# Patient Record
Sex: FEMALE | Race: WHITE | NOT HISPANIC OR LATINO | Employment: OTHER | URBAN - METROPOLITAN AREA
[De-identification: names, ages, dates, MRNs, and addresses within clinical notes are randomized per-mention and may not be internally consistent; named-entity substitution may affect disease eponyms.]

---

## 2017-01-10 ENCOUNTER — OFFICE VISIT (OUTPATIENT)
Dept: INTERNAL MEDICINE | Facility: CLINIC | Age: 62
End: 2017-01-10
Attending: INTERNAL MEDICINE
Payer: COMMERCIAL

## 2017-01-10 VITALS
WEIGHT: 202.63 LBS | SYSTOLIC BLOOD PRESSURE: 116 MMHG | DIASTOLIC BLOOD PRESSURE: 70 MMHG | HEART RATE: 63 BPM | BODY MASS INDEX: 37.29 KG/M2 | OXYGEN SATURATION: 97 % | HEIGHT: 62 IN

## 2017-01-10 DIAGNOSIS — Z01.810 PREOP CARDIOVASCULAR EXAM: Primary | ICD-10-CM

## 2017-01-10 PROCEDURE — 99213 OFFICE O/P EST LOW 20 MIN: CPT | Mod: S$GLB,,, | Performed by: INTERNAL MEDICINE

## 2017-01-10 PROCEDURE — 99999 PR PBB SHADOW E&M-EST. PATIENT-LVL III: CPT | Mod: PBBFAC,,, | Performed by: INTERNAL MEDICINE

## 2017-01-10 PROCEDURE — 3074F SYST BP LT 130 MM HG: CPT | Mod: S$GLB,,, | Performed by: INTERNAL MEDICINE

## 2017-01-10 PROCEDURE — 1159F MED LIST DOCD IN RCRD: CPT | Mod: S$GLB,,, | Performed by: INTERNAL MEDICINE

## 2017-01-10 PROCEDURE — 3078F DIAST BP <80 MM HG: CPT | Mod: S$GLB,,, | Performed by: INTERNAL MEDICINE

## 2017-01-10 NOTE — PROGRESS NOTES
"Subjective:       Patient ID: Lucas Mayer is a 61 y.o. female.    Chief Complaint: Pre-op Exam    HPI Comments: Here for urgent visit    Basal cell carcinoma of eye lid and is scheduled for partial resection of eyelid by Dr. Alcala. Has not done a home sleep study yet. She denies CP, SOB, RODRIGUEZ, PND, orthopnea, palpitations. Long Hx of dizziness provoked by certain environments or head movements. No change in symptoms and particularly no exertional dizziness.        Review of Systems    Objective:      Vitals:    01/10/17 1018   BP: 116/70   Pulse: 63   SpO2: 97%   Weight: 91.9 kg (202 lb 9.6 oz)   Height: 5' 2" (1.575 m)      Physical Exam   Constitutional: She is oriented to person, place, and time. She appears well-developed and well-nourished. No distress.   HENT:   Head: Normocephalic and atraumatic.   Mouth/Throat: Oropharynx is clear and moist. No oropharyngeal exudate.   Eyes: Conjunctivae and EOM are normal. Pupils are equal, round, and reactive to light. No scleral icterus.   Neck: No thyromegaly present.   Cardiovascular: Normal rate, regular rhythm and normal heart sounds.    No murmur heard.  Pulmonary/Chest: Effort normal and breath sounds normal. She has no wheezes. She has no rales.   Abdominal: Soft. She exhibits no distension. There is no tenderness.   Musculoskeletal: She exhibits no edema or tenderness.   Lymphadenopathy:     She has no cervical adenopathy.   Neurological: She is alert and oriented to person, place, and time.   Skin: Skin is warm and dry.   Psychiatric: She has a normal mood and affect. Her behavior is normal.       Assessment:       1. Preop cardiovascular exam        Plan:       Lucas was seen today for pre-op exam.    Diagnoses and all orders for this visit:    Preop cardiovascular exam  -No signs or symptoms of underlying or uncontrolled cardiac or pulmonary pathology  -EKG done 12/27/16 without any acute or remote signs of ischemia or underlying arrhythmia   -No " additional testing or change in management is required prior to elective procedure. Pt given completed pre-op paperwork today.             Side effects of medication(s) were discussed in detail and patient voiced understanding.  Patient will call back for any issues or complications.

## 2017-01-23 DIAGNOSIS — I10 BENIGN ESSENTIAL HYPERTENSION: ICD-10-CM

## 2017-01-23 RX ORDER — AMLODIPINE BESYLATE 5 MG/1
TABLET ORAL
Qty: 90 TABLET | Refills: 1 | Status: SHIPPED | OUTPATIENT
Start: 2017-01-23 | End: 2018-08-29

## 2017-03-07 ENCOUNTER — PATIENT MESSAGE (OUTPATIENT)
Dept: INTERNAL MEDICINE | Facility: CLINIC | Age: 62
End: 2017-03-07

## 2017-03-08 ENCOUNTER — PATIENT MESSAGE (OUTPATIENT)
Dept: INTERNAL MEDICINE | Facility: CLINIC | Age: 62
End: 2017-03-08

## 2017-06-01 ENCOUNTER — PATIENT MESSAGE (OUTPATIENT)
Dept: INTERNAL MEDICINE | Facility: CLINIC | Age: 62
End: 2017-06-01

## 2017-06-01 ENCOUNTER — PATIENT MESSAGE (OUTPATIENT)
Dept: ADMINISTRATIVE | Facility: HOSPITAL | Age: 62
End: 2017-06-01

## 2017-06-01 ENCOUNTER — PATIENT OUTREACH (OUTPATIENT)
Dept: ADMINISTRATIVE | Facility: HOSPITAL | Age: 62
End: 2017-06-01

## 2017-06-01 DIAGNOSIS — E55.9 VITAMIN D DEFICIENCY: ICD-10-CM

## 2017-06-01 DIAGNOSIS — R73.03 PRE-DIABETES: ICD-10-CM

## 2017-06-01 DIAGNOSIS — Z00.00 PREVENTATIVE HEALTH CARE: ICD-10-CM

## 2017-06-01 DIAGNOSIS — E78.5 HYPERLIPIDEMIA, UNSPECIFIED HYPERLIPIDEMIA TYPE: Primary | ICD-10-CM

## 2017-06-01 DIAGNOSIS — I10 ESSENTIAL HYPERTENSION: ICD-10-CM

## 2017-06-01 NOTE — TELEPHONE ENCOUNTER
Scheduled pt for labs at [facility] location, pt verbalized understanding and had no further questions

## 2017-06-02 ENCOUNTER — LAB VISIT (OUTPATIENT)
Dept: LAB | Facility: HOSPITAL | Age: 62
End: 2017-06-02
Attending: INTERNAL MEDICINE
Payer: COMMERCIAL

## 2017-06-02 DIAGNOSIS — E78.5 HYPERLIPIDEMIA, UNSPECIFIED HYPERLIPIDEMIA TYPE: ICD-10-CM

## 2017-06-02 DIAGNOSIS — I10 ESSENTIAL HYPERTENSION: ICD-10-CM

## 2017-06-02 DIAGNOSIS — Z00.00 PREVENTATIVE HEALTH CARE: ICD-10-CM

## 2017-06-02 DIAGNOSIS — E55.9 VITAMIN D DEFICIENCY: ICD-10-CM

## 2017-06-02 DIAGNOSIS — R73.03 PRE-DIABETES: ICD-10-CM

## 2017-06-02 LAB
25(OH)D3+25(OH)D2 SERPL-MCNC: 39 NG/ML
ALBUMIN SERPL BCP-MCNC: 4.3 G/DL
ALP SERPL-CCNC: 126 IU/L
ALT SERPL W/O P-5'-P-CCNC: 33 IU/L
ANION GAP SERPL CALC-SCNC: 10 MMOL/L
AST SERPL-CCNC: 26 IU/L
BASOPHILS # BLD AUTO: 0.04 K/UL
BASOPHILS NFR BLD: 0.6 %
BILIRUB SERPL-MCNC: 0.8 MG/DL
BUN SERPL-MCNC: 14 MG/DL
CALCIUM SERPL-MCNC: 9.3 MG/DL
CHLORIDE SERPL-SCNC: 104 MMOL/L
CHOLEST/HDLC SERPL: 3.9 {RATIO}
CO2 SERPL-SCNC: 29 MMOL/L
CREAT SERPL-MCNC: 0.66 MG/DL
DIFFERENTIAL METHOD: NORMAL
EOSINOPHIL # BLD AUTO: 0.2 K/UL
EOSINOPHIL NFR BLD: 3.5 %
ERYTHROCYTE [DISTWIDTH] IN BLOOD BY AUTOMATED COUNT: 13.5 %
EST. GFR  (AFRICAN AMERICAN): >60 ML/MIN/1.73 M^2
EST. GFR  (NON AFRICAN AMERICAN): >60 ML/MIN/1.73 M^2
GLUCOSE SERPL-MCNC: 99 MG/DL
HCT VFR BLD AUTO: 40.7 %
HDL/CHOLESTEROL RATIO: 25.6 %
HDLC SERPL-MCNC: 160 MG/DL
HDLC SERPL-MCNC: 41 MG/DL
HGB BLD-MCNC: 13.5 G/DL
LDLC SERPL CALC-MCNC: 93.2 MG/DL
LYMPHOCYTES # BLD AUTO: 1.4 K/UL
LYMPHOCYTES NFR BLD: 21.2 %
MCH RBC QN AUTO: 28.5 PG
MCHC RBC AUTO-ENTMCNC: 33.2 %
MCV RBC AUTO: 86 FL
MONOCYTES # BLD AUTO: 0.4 K/UL
MONOCYTES NFR BLD: 5.6 %
NEUTROPHILS # BLD AUTO: 4.7 K/UL
NEUTROPHILS NFR BLD: 69 %
NONHDLC SERPL-MCNC: 119 MG/DL
PLATELET # BLD AUTO: 245 K/UL
PMV BLD AUTO: 9.6 FL
POTASSIUM SERPL-SCNC: 4.5 MMOL/L
PROT SERPL-MCNC: 7.7 G/DL
RBC # BLD AUTO: 4.74 M/UL
SODIUM SERPL-SCNC: 143 MMOL/L
TRIGL SERPL-MCNC: 129 MG/DL
TSH SERPL DL<=0.005 MIU/L-ACNC: 3.12 UIU/ML
WBC # BLD AUTO: 6.79 K/UL

## 2017-06-02 PROCEDURE — 83036 HEMOGLOBIN GLYCOSYLATED A1C: CPT | Mod: PO

## 2017-06-02 PROCEDURE — 80053 COMPREHEN METABOLIC PANEL: CPT | Mod: PO

## 2017-06-02 PROCEDURE — 82306 VITAMIN D 25 HYDROXY: CPT | Mod: PO

## 2017-06-02 PROCEDURE — 85025 COMPLETE CBC W/AUTO DIFF WBC: CPT | Mod: PO

## 2017-06-02 PROCEDURE — 84443 ASSAY THYROID STIM HORMONE: CPT

## 2017-06-02 PROCEDURE — 36415 COLL VENOUS BLD VENIPUNCTURE: CPT | Mod: PO

## 2017-06-02 PROCEDURE — 80061 LIPID PANEL: CPT

## 2017-06-03 LAB
ESTIMATED AVG GLUCOSE: 114 MG/DL
HBA1C MFR BLD HPLC: 5.6 %

## 2017-06-15 ENCOUNTER — OFFICE VISIT (OUTPATIENT)
Dept: INTERNAL MEDICINE | Facility: CLINIC | Age: 62
End: 2017-06-15
Attending: INTERNAL MEDICINE
Payer: COMMERCIAL

## 2017-06-15 VITALS
HEIGHT: 62 IN | HEART RATE: 68 BPM | OXYGEN SATURATION: 97 % | WEIGHT: 196 LBS | DIASTOLIC BLOOD PRESSURE: 62 MMHG | SYSTOLIC BLOOD PRESSURE: 118 MMHG | BODY MASS INDEX: 36.07 KG/M2

## 2017-06-15 DIAGNOSIS — Z01.419 WELL WOMAN EXAM: ICD-10-CM

## 2017-06-15 DIAGNOSIS — E78.5 HYPERLIPIDEMIA, UNSPECIFIED HYPERLIPIDEMIA TYPE: ICD-10-CM

## 2017-06-15 DIAGNOSIS — K76.0 NAFLD (NONALCOHOLIC FATTY LIVER DISEASE): ICD-10-CM

## 2017-06-15 DIAGNOSIS — R42 VERTIGO: ICD-10-CM

## 2017-06-15 DIAGNOSIS — I10 BENIGN HYPERTENSION: ICD-10-CM

## 2017-06-15 DIAGNOSIS — Z23 NEED FOR DIPHTHERIA-TETANUS-PERTUSSIS (TDAP) VACCINE: Primary | ICD-10-CM

## 2017-06-15 DIAGNOSIS — Z12.31 ENCOUNTER FOR SCREENING MAMMOGRAM FOR BREAST CANCER: ICD-10-CM

## 2017-06-15 PROCEDURE — 90471 IMMUNIZATION ADMIN: CPT | Mod: S$GLB,,, | Performed by: INTERNAL MEDICINE

## 2017-06-15 PROCEDURE — 99214 OFFICE O/P EST MOD 30 MIN: CPT | Mod: 25,S$GLB,, | Performed by: INTERNAL MEDICINE

## 2017-06-15 PROCEDURE — 90715 TDAP VACCINE 7 YRS/> IM: CPT | Mod: S$GLB,,, | Performed by: INTERNAL MEDICINE

## 2017-06-15 PROCEDURE — 99999 PR PBB SHADOW E&M-EST. PATIENT-LVL III: CPT | Mod: PBBFAC,,, | Performed by: INTERNAL MEDICINE

## 2017-06-15 NOTE — PROGRESS NOTES
Subjective:       Patient ID: Lucas Mayer is a 62 y.o. female.    Chief Complaint: No chief complaint on file.    Here for annual exam    Vertigo  -Seen by neurology who recommended vestibular rehab, but pt has not attended. She was evaluated by ENT Dr. White and was told she had normal hearing and vestibular test. She was placed on low dose valium 2mg BID with an 80% reduction in her symptoms. Symptoms remain well controlled. She will still get dizzy when lying flat or driving over bridges.     Bilateral knee   -frequent cracking and occasional pain. Not debilitating. Overall stable    Suspected MORALES/fatigue  Initial sleep study denied by insurance. Home sleep study planned but patient has not yet scheduled    HTN  -well controlled with initiation of amlodipine. Pt has also lost 30lbs since last visit    Due for FOBT in 2 months.          Review of Systems   HENT: Negative.    Eyes: Negative.    Respiratory: Negative.    Cardiovascular: Negative.    Gastrointestinal: Negative.    Endocrine: Negative.    Genitourinary: Negative.    Musculoskeletal: Positive for arthralgias and joint swelling.   Skin: Negative.    Allergic/Immunologic: Negative.    Neurological: Positive for dizziness.   Hematological: Negative.    Psychiatric/Behavioral: Negative.        Objective:      There were no vitals filed for this visit.   Physical Exam   Constitutional: She is oriented to person, place, and time. She appears well-developed and well-nourished. No distress.   HENT:   Head: Normocephalic and atraumatic.   Mouth/Throat: Oropharynx is clear and moist. No oropharyngeal exudate.   Eyes: Conjunctivae and EOM are normal. Pupils are equal, round, and reactive to light. No scleral icterus.   Neck: No thyromegaly present.   Cardiovascular: Normal rate, regular rhythm and normal heart sounds.    No murmur heard.  Pulmonary/Chest: Effort normal and breath sounds normal. She has no wheezes. She has no rales.   Abdominal: Soft. She  exhibits no distension. There is no tenderness.   Musculoskeletal: She exhibits no edema or tenderness.        Left knee: She exhibits decreased range of motion and swelling. She exhibits no effusion, normal patellar mobility, no bony tenderness and normal meniscus. No tenderness found.   Lymphadenopathy:     She has no cervical adenopathy.   Neurological: She is alert and oriented to person, place, and time.   Skin: Skin is warm and dry.   Psychiatric: She has a normal mood and affect. Her behavior is normal.       Assessment:       1. Need for diphtheria-tetanus-pertussis (Tdap) vaccine    2. Encounter for screening mammogram for breast cancer        Plan:       Lucas was seen today for annual exam.    Diagnoses and all orders for this visit:  Lucas was seen today for annual exam.    Diagnoses and all orders for this visit:    Need for diphtheria-tetanus-pertussis (Tdap) vaccine  -     Tdap Vaccine    Encounter for screening mammogram for breast cancer  -     Mammo Digital Screening Bilat with CAD; Future    NAFLD (nonalcoholic fatty liver disease)  -pt is losing weight and dieting. LFTs now normal. Will monitor.    Vertigo  Continue valium BID    Hyperlipidemia, unspecified hyperlipidemia type  -continue statin    Benign hypertension  Controlled. Will monitor as patient may not need if weight loss continues    Well woman exam  -     Ambulatory Referral to Obstetrics / Gynecology    Suspected MORALES/fatigue  stressed need for completion sleep study and ramifications of uncontrolled MORALES.                   Side effects of medication(s) were discussed in detail and patient voiced understanding.  Patient will call back for any issues or complications.

## 2017-06-15 NOTE — PROGRESS NOTES
"Subjective:       Patient ID: Lucas Mayer is a 62 y.o. female.    Chief Complaint: Annual Exam    Here for annual visit            Review of Systems    Objective:      Vitals:    06/15/17 1308   BP: 118/62   Pulse: 68   SpO2: 97%   Weight: 88.9 kg (195 lb 15.8 oz)   Height: 5' 2" (1.575 m)      Physical Exam    Assessment:       1. Need for diphtheria-tetanus-pertussis (Tdap) vaccine    2. Encounter for screening mammogram for breast cancer    3. NAFLD (nonalcoholic fatty liver disease)    4. Vertigo    5. Hyperlipidemia, unspecified hyperlipidemia type    6. Benign hypertension        Plan:       Lucas was seen today for annual exam.    Diagnoses and all orders for this visit:    Need for diphtheria-tetanus-pertussis (Tdap) vaccine  -     Tdap Vaccine    Encounter for screening mammogram for breast cancer  -     Mammo Digital Screening Bilat with CAD; Future    NAFLD (nonalcoholic fatty liver disease)    Vertigo    Hyperlipidemia, unspecified hyperlipidemia type    Benign hypertension            Notification of Lab Results: {Blank single:57137::"[facility]","Phone Call","Letter"}    Side effects of medication(s) were discussed in detail and patient voiced understanding.  Patient will call back for any issues or complications.   "

## 2017-06-15 NOTE — PROGRESS NOTES
Patient given TDAP IM in the LD. Patient tolerated well and Band-Aid was applied. Lot#[id number] Exp:04/05/2019. Patient advised to wait in the lobby for 15 min to make sure no adverse reactions occur. Patient states verbal understanding and has no further questions.

## 2017-06-16 ENCOUNTER — HOSPITAL ENCOUNTER (OUTPATIENT)
Dept: RADIOLOGY | Facility: HOSPITAL | Age: 62
Discharge: HOME | End: 2017-06-16
Attending: INTERNAL MEDICINE
Payer: COMMERCIAL

## 2017-06-16 DIAGNOSIS — Z12.31 ENCOUNTER FOR SCREENING MAMMOGRAM FOR BREAST CANCER: ICD-10-CM

## 2017-06-16 PROCEDURE — 77067 SCR MAMMO BI INCL CAD: CPT | Mod: TC

## 2017-07-24 ENCOUNTER — PATIENT MESSAGE (OUTPATIENT)
Dept: INTERNAL MEDICINE | Facility: CLINIC | Age: 62
End: 2017-07-24

## 2017-07-24 DIAGNOSIS — H53.8 BLURRY VISION: Primary | ICD-10-CM

## 2017-07-24 DIAGNOSIS — H49.10: ICD-10-CM

## 2017-07-24 DIAGNOSIS — I10 BENIGN ESSENTIAL HYPERTENSION: ICD-10-CM

## 2017-07-24 RX ORDER — AMLODIPINE BESYLATE 5 MG/1
TABLET ORAL
Qty: 90 TABLET | Refills: 3 | Status: SHIPPED | OUTPATIENT
Start: 2017-07-24 | End: 2017-08-16 | Stop reason: SDUPTHER

## 2017-08-03 ENCOUNTER — OFFICE VISIT (OUTPATIENT)
Dept: OBSTETRICS AND GYNECOLOGY | Facility: CLINIC | Age: 62
End: 2017-08-03
Payer: COMMERCIAL

## 2017-08-03 VITALS
HEIGHT: 62 IN | WEIGHT: 194.44 LBS | SYSTOLIC BLOOD PRESSURE: 124 MMHG | DIASTOLIC BLOOD PRESSURE: 70 MMHG | BODY MASS INDEX: 35.78 KG/M2

## 2017-08-03 DIAGNOSIS — Z01.419 WELL WOMAN EXAM WITH ROUTINE GYNECOLOGICAL EXAM: Primary | ICD-10-CM

## 2017-08-03 DIAGNOSIS — N90.89 VULVAR IRRITATION: ICD-10-CM

## 2017-08-03 PROCEDURE — 99396 PREV VISIT EST AGE 40-64: CPT | Mod: S$GLB,,, | Performed by: OBSTETRICS & GYNECOLOGY

## 2017-08-03 PROCEDURE — 99999 PR PBB SHADOW E&M-EST. PATIENT-LVL III: CPT | Mod: PBBFAC,,, | Performed by: OBSTETRICS & GYNECOLOGY

## 2017-08-03 RX ORDER — CLOBETASOL PROPIONATE 0.5 MG/G
OINTMENT TOPICAL 2 TIMES DAILY
Qty: 30 G | Refills: 0 | Status: SHIPPED | OUTPATIENT
Start: 2017-08-03 | End: 2018-08-29

## 2017-08-03 NOTE — PROGRESS NOTES
GYNECOLOGY OFFICE NOTE    Reason for visit: annual    HPI: Pt is a 62 y.o.  female  who presents for annual. S/p BK/BSO secondary fibroids in . Reports hx of abnormal pap in  and had cryo. All since have been normal. She is not sexually active. She does not desire STI screening. She denies vaginal discharge.  Last MMG 2017- negative. Reports an area on left vulvar that has been itching intermittently for this year. Scratches a lot.     Past Medical History:   Diagnosis Date    Depression     Hypertension     Other and unspecified hyperlipidemia     Vertigo        Past Surgical History:   Procedure Laterality Date    HYSTERECTOMY      fibroids- BK/BSO    OOPHORECTOMY      TONSILLECTOMY      WISDOM TOOTH EXTRACTION         Family History   Problem Relation Age of Onset    Diabetes Mother     Stroke Mother     Essential Tremor Mother     Melanoma Father     Hypertension Father     Essential Tremor Sister     Essential Tremor Sister     Breast cancer Neg Hx     Cancer Neg Hx     Eclampsia Neg Hx        Social History   Substance Use Topics    Smoking status: Never Smoker    Smokeless tobacco: Never Used    Alcohol use 0.5 oz/week     1 Standard drinks or equivalent per week      Comment: Social       OB History    Para Term  AB Living   3 1     2 1   SAB TAB Ectopic Multiple Live Births                  # Outcome Date GA Lbr Palmer/2nd Weight Sex Delivery Anes PTL Lv   3 AB            2 AB            1 Para                   Current Outpatient Prescriptions   Medication Sig    amlodipine (NORVASC) 5 MG tablet TAKE 1 TABLET(5 MG) BY MOUTH EVERY DAY    amlodipine (NORVASC) 5 MG tablet TAKE 1 TABLET(5 MG) BY MOUTH EVERY DAY    aspirin (ECOTRIN) 81 MG EC tablet Take 81 mg by mouth once daily.    atorvastatin (LIPITOR) 40 MG tablet TAKE 1 TABLET BY MOUTH EVERY DAY    blood pressure test kit-large Kit Use as directed    citalopram (CELEXA) 20 MG tablet Take  "1 tablet (20 mg total) by mouth once daily.    diazepam (VALIUM) 2 MG tablet     clobetasol 0.05% (TEMOVATE) 0.05 % Oint Apply topically 2 (two) times daily. Apply to affected area twice daily as needed for up to 2 weeks     No current facility-administered medications for this visit.        Allergies: Ancef [cefazolin]; Codeine; and Pcn [penicillins]     /70   Ht 5' 2" (1.575 m)   Wt 88.2 kg (194 lb 7.1 oz)   BMI 35.56 kg/m²     ROS:  GENERAL: Denies fever or chills.   SKIN: Denies rash or lesions.   HEAD: Denies head injury or headache.   CHEST: Denies chest pain or shortness of breath.   CARDIOVASCULAR: Denies palpitations or chest pain.   ABDOMEN: No abdominal pain, constipation, diarrhea, nausea, vomiting or rectal bleeding.   URINARY: No dysuria, hematuria, or burning on urination.  REPRODUCTIVE: See HPI.   BREASTS: Denies pain, lumps, or nipple discharge.   NEUROLOGIC: Denies syncope or weakness.     Physical Exam:  GENERAL: alert, appears stated age and cooperative  CHEST: Normal respiratory effort  HEART: S1 and S2 normal, regular rate and rhythm  NECK: normal appearance, no thyromegaly masses or tenderness  SKIN: no acne, striae, hirsutism  BREAST EXAM: breasts appear normal, no suspicious masses, no skin or nipple changes or axillary nodes  ABDOMEN: abdomen is soft without significant tenderness, masses, organomegaly or guarding, no hernias noted  EXTERNAL GENITALIA:  normal general appearance, small area on right labia majora that's red but no signs of infection  URETHRA: normal urethra, normal urethral meatus  VAGINA:  atrophic mucosa  CERVIX:  absent cervix  UTERUS:  surgically absent  ADNEXA:  not indicated    Diagnosis:  1. Well woman exam with routine gynecological exam    2. Vulvar irritation        Plan:   1. Annual- paps no longer indicated. Up to date with MMG  2. Will try trial of clobetasol. Given precautions to return if worsening or persistent after therapy.     Orders Placed This " Encounter    clobetasol 0.05% (TEMOVATE) 0.05 % Oint       Patient was counseled today on the new ACS guidelines for cervical cytology screening as well as the current recommendations for breast cancer screening. She was counseled to follow up with her PCP for other routine health maintenance.     Return in about 1 year (around 8/3/2018), or if symptoms worsen or fail to improve, for annual.      Ana Paula Salazar MD  OB/GYN  Pager: [phone]

## 2017-08-03 NOTE — LETTER
August 3, 2017      Jean Carlos Samuels MD  [address]  [address]  [facility] [address]           [place] [facility]  [address]  [facility] [address]  Phone: [phone]          Patient: Lucas Mayer   MR Number: [id number]   YOB: 1955   Date of Visit: 8/3/2017       Dear Dr. Jean Carlos Samuels:    Thank you for referring Lucas Mayer to me for evaluation. Attached you will find relevant portions of my assessment and plan of care.    If you have questions, please do not hesitate to call me. I look forward to following Lucas Mayer along with you.    Sincerely,    Ana Paula Salazar MD    Enclosure  CC:  No Recipients    If you would like to receive this communication electronically, please contact [email or web address] or [phone] to request more information on [employer] Link access.    For providers and/or their staff who would like to refer a patient to [facility], please contact us through our one-stop-shop provider referral line, [facility], at [phone].    If you feel you have received this communication in error or would no longer like to receive these types of communications, please e-mail [email or web address]

## 2017-08-07 ENCOUNTER — PATIENT MESSAGE (OUTPATIENT)
Dept: OPHTHALMOLOGY | Facility: CLINIC | Age: 62
End: 2017-08-07

## 2017-08-07 RX ORDER — ATORVASTATIN CALCIUM 40 MG/1
TABLET, FILM COATED ORAL
Qty: 90 TABLET | Refills: 3 | Status: SHIPPED | OUTPATIENT
Start: 2017-08-07 | End: 2017-08-16 | Stop reason: SDUPTHER

## 2017-08-14 RX ORDER — CITALOPRAM 20 MG/1
TABLET, FILM COATED ORAL
Qty: 90 TABLET | Refills: 3 | Status: SHIPPED | OUTPATIENT
Start: 2017-08-14 | End: 2017-08-16 | Stop reason: SDUPTHER

## 2017-08-16 ENCOUNTER — INITIAL CONSULT (OUTPATIENT)
Dept: OPHTHALMOLOGY | Facility: CLINIC | Age: 62
End: 2017-08-16
Payer: COMMERCIAL

## 2017-08-16 DIAGNOSIS — H50.21 HYPERTROPIA OF RIGHT EYE: ICD-10-CM

## 2017-08-16 DIAGNOSIS — H55.09 ROTARY NYSTAGMUS: ICD-10-CM

## 2017-08-16 PROCEDURE — 99999 PR PBB SHADOW E&M-EST. PATIENT-LVL III: CPT | Mod: PBBFAC,,, | Performed by: OPHTHALMOLOGY

## 2017-08-16 PROCEDURE — 92004 COMPRE OPH EXAM NEW PT 1/>: CPT | Mod: S$GLB,,, | Performed by: OPHTHALMOLOGY

## 2017-08-16 NOTE — PROGRESS NOTES
HPI     Concerns About Ocular Health    Additional comments: diplopia           Comments   Referred by   Seen Dr.Kevin Davis   Hx of black out/throw up x 4 years ago which in 4 different occassions.  Pt states she would try to get up out bed she would black out and throw up   which she think it lasted 30 minutes. Pt states this happen in one night.  Dx w/vertigo,double vision and nausea.  Pt states was given prism which help, but notice vision is unstable.    MRI done which was negative.  Eye Drops:Refresh plus daily OU    I have personally interviewed the patient, reviewed the history and   examined the patient and agree with the technician's exam.    Dr. Davis put prism in her eyeglasses.       Last edited by Jerzy Corona MD on 8/16/2017 11:05 AM. (History)            Assessment /Plan     For exam results, see Encounter Report.    Rotary nystagmus    Hypertropia of right eye      Ms. Mayer has a right hyperphoria that she fuses on double Babb anuj testing. The nystagmus appears tied in with her vertigo. I will attempt to get her records from   Dr. Davis's old office and her MRI. I will repeat her exam in two months.

## 2017-08-31 ENCOUNTER — PATIENT MESSAGE (OUTPATIENT)
Dept: OPHTHALMOLOGY | Facility: CLINIC | Age: 62
End: 2017-08-31

## 2017-09-12 ENCOUNTER — PATIENT MESSAGE (OUTPATIENT)
Dept: INTERNAL MEDICINE | Facility: CLINIC | Age: 62
End: 2017-09-12

## 2017-09-29 ENCOUNTER — PATIENT MESSAGE (OUTPATIENT)
Dept: OPHTHALMOLOGY | Facility: CLINIC | Age: 62
End: 2017-09-29

## 2017-10-06 ENCOUNTER — OFFICE VISIT (OUTPATIENT)
Dept: OPHTHALMOLOGY | Facility: CLINIC | Age: 62
End: 2017-10-06
Payer: COMMERCIAL

## 2017-10-06 DIAGNOSIS — H55.09 ROTARY NYSTAGMUS: Primary | ICD-10-CM

## 2017-10-06 PROCEDURE — 99999 PR PBB SHADOW E&M-EST. PATIENT-LVL III: CPT | Mod: PBBFAC,,, | Performed by: OPHTHALMOLOGY

## 2017-10-06 PROCEDURE — 92012 INTRM OPH EXAM EST PATIENT: CPT | Mod: S$GLB,,, | Performed by: OPHTHALMOLOGY

## 2017-10-06 NOTE — PROGRESS NOTES
HPI     DLS:08/16/2017 Cj  Patient here for 2 month follow up Rotary nystagmus.  Occasional shooting pain OU.  Pt states sampled looking at an [employer] and some lines were off(side   by side)    I have personally interviewed the patient, reviewed the history and   examined the patient and agree with the technician's exam.      Last edited by Jerzy Corona MD on 10/6/2017  9:28 AM. (History)            Assessment /Plan     For exam results, see Encounter Report.    Rotary nystagmus      Her nystagmus is stable. She is functioning fairly well visually. I discussed the possibility of investigating surgery but she declined. I recommended she stay on her current medication regimen. Return to me as requested.

## 2017-11-03 RX ORDER — CITALOPRAM 20 MG/1
TABLET, FILM COATED ORAL
Qty: 90 TABLET | Refills: 3 | Status: SHIPPED | OUTPATIENT
Start: 2017-11-03 | End: 2018-08-29

## 2018-01-06 ENCOUNTER — PATIENT MESSAGE (OUTPATIENT)
Dept: INTERNAL MEDICINE | Facility: CLINIC | Age: 63
End: 2018-01-06

## 2018-01-08 RX ORDER — DIAZEPAM 2 MG/1
2 TABLET ORAL EVERY 12 HOURS PRN
Qty: 60 TABLET | Refills: 5 | Status: SHIPPED | OUTPATIENT
Start: 2018-01-08 | End: 2018-08-01 | Stop reason: SDUPTHER

## 2018-01-17 ENCOUNTER — PATIENT MESSAGE (OUTPATIENT)
Dept: INTERNAL MEDICINE | Facility: CLINIC | Age: 63
End: 2018-01-17

## 2018-01-18 ENCOUNTER — PATIENT MESSAGE (OUTPATIENT)
Dept: INTERNAL MEDICINE | Facility: CLINIC | Age: 63
End: 2018-01-18

## 2018-07-27 DIAGNOSIS — I10 BENIGN ESSENTIAL HYPERTENSION: ICD-10-CM

## 2018-07-27 RX ORDER — ATORVASTATIN CALCIUM 40 MG/1
TABLET, FILM COATED ORAL
Qty: 90 TABLET | Refills: 3 | Status: SHIPPED | OUTPATIENT
Start: 2018-07-27 | End: 2018-09-26 | Stop reason: SDUPTHER

## 2018-07-27 RX ORDER — AMLODIPINE BESYLATE 5 MG/1
TABLET ORAL
Qty: 90 TABLET | Refills: 3 | Status: SHIPPED | OUTPATIENT
Start: 2018-07-27 | End: 2019-08-02

## 2018-08-01 DIAGNOSIS — E78.5 HYPERLIPIDEMIA, UNSPECIFIED HYPERLIPIDEMIA TYPE: ICD-10-CM

## 2018-08-01 DIAGNOSIS — Z00.00 ANNUAL PHYSICAL EXAM: Primary | ICD-10-CM

## 2018-08-01 DIAGNOSIS — Z91.89 AT RISK FOR HYPOTHYROIDISM: ICD-10-CM

## 2018-08-01 DIAGNOSIS — E78.5 HYPERLIPIDEMIA: ICD-10-CM

## 2018-08-01 DIAGNOSIS — F32.A DEPRESSION, UNSPECIFIED DEPRESSION TYPE: ICD-10-CM

## 2018-08-01 RX ORDER — DIAZEPAM 2 MG/1
2 TABLET ORAL EVERY 12 HOURS PRN
Qty: 60 TABLET | Refills: 2 | Status: SHIPPED | OUTPATIENT
Start: 2018-08-01 | End: 2018-08-05 | Stop reason: SDUPTHER

## 2018-08-01 NOTE — TELEPHONE ENCOUNTER
Could not sign tsh lab d/t not have correct dx. Please advise if pt should have this done as her annual lab. Did not accept annual physical or risk for hypothyroidism as a dx. Please advise if pt should not have this lab drawn or if you know if there is a dx code covered by medicare part a and b

## 2018-08-01 NOTE — TELEPHONE ENCOUNTER
----- Message from Marga Harrington sent at 8/1/2018  8:19 AM CDT -----  Contact: Lucas  Name of Who is Calling: Lucas      What is the request in detail: Patient is requesting annual blood work orders       Can the clinic reply by [facility]: no      [facility]: [phone]

## 2018-08-01 NOTE — TELEPHONE ENCOUNTER
----- Message from Marga Harrington sent at 8/1/2018  8:19 AM CDT -----  Contact: Lucas  Can the clinic reply in [facility]: no      Please refill the medication(s) listed below. Please call the patient when the prescription(s) is ready for  at this phone number   [phone]      Medication #1 diazePAM (VALIUM) 2 MG tablet    Medication #2       Preferred Pharmacy: [facility]

## 2018-08-03 ENCOUNTER — LAB VISIT (OUTPATIENT)
Dept: LAB | Facility: HOSPITAL | Age: 63
End: 2018-08-03
Attending: OPHTHALMOLOGY
Payer: MEDICARE

## 2018-08-03 DIAGNOSIS — Z91.89 AT RISK FOR HYPOTHYROIDISM: ICD-10-CM

## 2018-08-03 DIAGNOSIS — E78.5 HYPERLIPIDEMIA, UNSPECIFIED HYPERLIPIDEMIA TYPE: ICD-10-CM

## 2018-08-03 DIAGNOSIS — Z00.00 ANNUAL PHYSICAL EXAM: ICD-10-CM

## 2018-08-03 DIAGNOSIS — E78.5 HYPERLIPIDEMIA: ICD-10-CM

## 2018-08-03 LAB
ALBUMIN SERPL BCP-MCNC: 4.1 G/DL
ALP SERPL-CCNC: 99 U/L
ALT SERPL W/O P-5'-P-CCNC: 22 U/L
ANION GAP SERPL CALC-SCNC: 6 MMOL/L
AST SERPL-CCNC: 20 U/L
BASOPHILS # BLD AUTO: 0.07 K/UL
BASOPHILS NFR BLD: 1.1 %
BILIRUB SERPL-MCNC: 0.5 MG/DL
BUN SERPL-MCNC: 11 MG/DL
CALCIUM SERPL-MCNC: 8.9 MG/DL
CHLORIDE SERPL-SCNC: 108 MMOL/L
CHOLEST SERPL-MCNC: 150 MG/DL
CHOLEST/HDLC SERPL: 3.1 {RATIO}
CO2 SERPL-SCNC: 27 MMOL/L
CREAT SERPL-MCNC: 0.6 MG/DL
DIFFERENTIAL METHOD: NORMAL
EOSINOPHIL # BLD AUTO: 0.3 K/UL
EOSINOPHIL NFR BLD: 4.7 %
ERYTHROCYTE [DISTWIDTH] IN BLOOD BY AUTOMATED COUNT: 12.6 %
EST. GFR  (AFRICAN AMERICAN): >60 ML/MIN/1.73 M^2
EST. GFR  (NON AFRICAN AMERICAN): >60 ML/MIN/1.73 M^2
GLUCOSE SERPL-MCNC: 96 MG/DL
HCT VFR BLD AUTO: 39.9 %
HDLC SERPL-MCNC: 48 MG/DL
HDLC SERPL: 32 %
HGB BLD-MCNC: 13.2 G/DL
IMM GRANULOCYTES # BLD AUTO: 0.02 K/UL
IMM GRANULOCYTES NFR BLD AUTO: 0.3 %
LDLC SERPL CALC-MCNC: 84.8 MG/DL
LYMPHOCYTES # BLD AUTO: 1.7 K/UL
LYMPHOCYTES NFR BLD: 27.2 %
MCH RBC QN AUTO: 28.8 PG
MCHC RBC AUTO-ENTMCNC: 33.1 G/DL
MCV RBC AUTO: 87 FL
MONOCYTES # BLD AUTO: 0.4 K/UL
MONOCYTES NFR BLD: 5.7 %
NEUTROPHILS # BLD AUTO: 3.8 K/UL
NEUTROPHILS NFR BLD: 61 %
NONHDLC SERPL-MCNC: 102 MG/DL
NRBC BLD-RTO: 0 /100 WBC
PLATELET # BLD AUTO: 232 K/UL
PMV BLD AUTO: 9.5 FL
POTASSIUM SERPL-SCNC: 4 MMOL/L
PROT SERPL-MCNC: 7.3 G/DL
RBC # BLD AUTO: 4.58 M/UL
SODIUM SERPL-SCNC: 141 MMOL/L
TRIGL SERPL-MCNC: 86 MG/DL
TSH SERPL DL<=0.005 MIU/L-ACNC: 2.24 UIU/ML
WBC # BLD AUTO: 6.15 K/UL

## 2018-08-03 PROCEDURE — 80053 COMPREHEN METABOLIC PANEL: CPT

## 2018-08-03 PROCEDURE — 36415 COLL VENOUS BLD VENIPUNCTURE: CPT

## 2018-08-03 PROCEDURE — 80061 LIPID PANEL: CPT

## 2018-08-03 PROCEDURE — 84443 ASSAY THYROID STIM HORMONE: CPT

## 2018-08-03 PROCEDURE — 85025 COMPLETE CBC W/AUTO DIFF WBC: CPT

## 2018-08-04 ENCOUNTER — NURSE TRIAGE (OUTPATIENT)
Dept: ADMINISTRATIVE | Facility: CLINIC | Age: 63
End: 2018-08-04

## 2018-08-04 NOTE — TELEPHONE ENCOUNTER
Patient called to report the following:     -refill on diazepam   -Dr. Kristine Samaniego notified and states for patient to  pharmacy / follow up with provider.     Education completed per [facility] On Call Care Advice including follow up with provider. Patient verbalized understating.     Reason for Disposition   Caller requesting a NON-URGENT new prescription or refill and triager unable to refill per unit policy    Protocols used: ST MEDICATION QUESTION CALL-A-AH

## 2018-08-05 RX ORDER — DIAZEPAM 2 MG/1
2 TABLET ORAL EVERY 12 HOURS PRN
Qty: 60 TABLET | Refills: 2 | Status: SHIPPED | OUTPATIENT
Start: 2018-08-05 | End: 2018-09-07 | Stop reason: SDUPTHER

## 2018-08-09 RX ORDER — DIAZEPAM 2 MG/1
TABLET ORAL
Qty: 60 TABLET | Refills: 0 | OUTPATIENT
Start: 2018-08-09

## 2018-08-14 ENCOUNTER — PATIENT MESSAGE (OUTPATIENT)
Dept: INTERNAL MEDICINE | Facility: CLINIC | Age: 63
End: 2018-08-14

## 2018-08-22 RX ORDER — CITALOPRAM 20 MG/1
TABLET, FILM COATED ORAL
Qty: 90 TABLET | Refills: 2 | Status: SHIPPED | OUTPATIENT
Start: 2018-08-22 | End: 2019-10-02

## 2018-08-29 ENCOUNTER — HOSPITAL ENCOUNTER (OUTPATIENT)
Dept: RADIOLOGY | Facility: OTHER | Age: 63
Discharge: HOME | End: 2018-08-29
Attending: INTERNAL MEDICINE
Payer: MEDICARE

## 2018-08-29 ENCOUNTER — OFFICE VISIT (OUTPATIENT)
Dept: INTERNAL MEDICINE | Facility: CLINIC | Age: 63
End: 2018-08-29
Attending: INTERNAL MEDICINE
Payer: MEDICARE

## 2018-08-29 VITALS
OXYGEN SATURATION: 97 % | DIASTOLIC BLOOD PRESSURE: 80 MMHG | WEIGHT: 198 LBS | HEIGHT: 62 IN | SYSTOLIC BLOOD PRESSURE: 138 MMHG | BODY MASS INDEX: 36.44 KG/M2 | HEART RATE: 68 BPM

## 2018-08-29 DIAGNOSIS — R42 VERTIGO: ICD-10-CM

## 2018-08-29 DIAGNOSIS — Z00.00 ANNUAL PHYSICAL EXAM: Primary | ICD-10-CM

## 2018-08-29 DIAGNOSIS — K76.0 NAFLD (NONALCOHOLIC FATTY LIVER DISEASE): ICD-10-CM

## 2018-08-29 DIAGNOSIS — Z78.0 POSTMENOPAUSAL: ICD-10-CM

## 2018-08-29 DIAGNOSIS — H25.9 SENILE CATARACT OF LEFT EYE, UNSPECIFIED AGE-RELATED CATARACT TYPE: ICD-10-CM

## 2018-08-29 DIAGNOSIS — Z23 NEED FOR PNEUMOCOCCAL VACCINATION: ICD-10-CM

## 2018-08-29 DIAGNOSIS — I10 BENIGN HYPERTENSION: ICD-10-CM

## 2018-08-29 DIAGNOSIS — E78.5 HYPERLIPIDEMIA, UNSPECIFIED HYPERLIPIDEMIA TYPE: ICD-10-CM

## 2018-08-29 PROCEDURE — 99999 PR PBB SHADOW E&M-EST. PATIENT-LVL IV: CPT | Mod: PBBFAC,,, | Performed by: INTERNAL MEDICINE

## 2018-08-29 PROCEDURE — 99214 OFFICE O/P EST MOD 30 MIN: CPT | Mod: S$PBB,,, | Performed by: INTERNAL MEDICINE

## 2018-08-29 PROCEDURE — 77080 DXA BONE DENSITY AXIAL: CPT | Mod: 26,,, | Performed by: RADIOLOGY

## 2018-08-29 PROCEDURE — 77080 DXA BONE DENSITY AXIAL: CPT | Mod: TC

## 2018-08-29 PROCEDURE — 99214 OFFICE O/P EST MOD 30 MIN: CPT | Mod: PBBFAC,25 | Performed by: INTERNAL MEDICINE

## 2018-08-29 NOTE — PROGRESS NOTES
"Subjective:       Patient ID: Lucas Mayer is a 63 y.o. female.    Chief Complaint: Annual Exam; Hypertension; and Eye Problem    Here for annual exam    Patient presents today for routine evaluation, physical, and labs. Patient has no major concerns or complaints today. Worsening vision in left eye with cataract and needs removalShe continues to get dizzy on daily basis. Drastically improved compared to 4 years prior after being started on daily benzodiazepine. No changes in symptomology or triggers, but frequency is up slightly.                        Review of Systems   Constitutional: Negative for chills, fatigue, fever and unexpected weight change.   HENT: Negative for ear pain, hearing loss, postnasal drip, tinnitus, trouble swallowing and voice change.    Eyes: Positive for visual disturbance.   Respiratory: Negative for cough, chest tightness, shortness of breath and wheezing.    Cardiovascular: Negative for chest pain, palpitations and leg swelling.   Gastrointestinal: Negative for abdominal pain, blood in stool, diarrhea, nausea and vomiting.   Endocrine: Negative for polydipsia, polyphagia and polyuria.   Genitourinary: Negative for difficulty urinating, dysuria and hematuria.   Skin: Negative for rash.   Allergic/Immunologic: Negative for food allergies.   Neurological: Positive for dizziness. Negative for numbness and headaches.   Hematological: Does not bruise/bleed easily.   Psychiatric/Behavioral: The patient is not nervous/anxious.        Objective:      Vitals:    08/29/18 1111   BP: 138/80   BP Location: Right arm   Patient Position: Sitting   BP Method: Large (Manual)   Pulse: 68   SpO2: 97%   Weight: 89.8 kg (197 lb 15.6 oz)   Height: 5' 2" (1.575 m)      Physical Exam   Constitutional: She is oriented to person, place, and time. She appears well-developed and well-nourished. No distress.   HENT:   Head: Normocephalic and atraumatic.   Mouth/Throat: Oropharynx is clear and moist. No " oropharyngeal exudate.   Eyes: Conjunctivae and EOM are normal. Pupils are equal, round, and reactive to light. No scleral icterus.   Neck: No thyromegaly present.   Cardiovascular: Normal rate, regular rhythm and normal heart sounds.   No murmur heard.  Pulmonary/Chest: Effort normal and breath sounds normal. She has no wheezes. She has no rales.   Abdominal: Soft. She exhibits no distension. There is no tenderness.   Musculoskeletal: She exhibits no edema or tenderness.   Lymphadenopathy:     She has no cervical adenopathy.   Neurological: She is alert and oriented to person, place, and time.   Skin: Skin is warm and dry.   Psychiatric: She has a normal mood and affect. Her behavior is normal.       Assessment:       1. Annual physical exam    2. Postmenopausal    3. Need for pneumococcal vaccination    4. Senile cataract of left eye, unspecified age-related cataract type    5. NAFLD (nonalcoholic fatty liver disease)    6. Benign hypertension    7. Hyperlipidemia, unspecified hyperlipidemia type    8. Vertigo        Plan:       Lucas was seen today for annual exam, hypertension and eye problem.    Diagnoses and all orders for this visit:    Annual physical exam  Labs reviewed.     Postmenopausal  -     DXA Bone Density Spine And Hip; Future    Need for pneumococcal vaccination  Pt refused all vaccines despite discussion of benefit and risk    Senile cataract of left eye, unspecified age-related cataract type  -     Ambulatory referral to Ophthalmology    NAFLD (nonalcoholic fatty liver disease)  Weight loss and low fat/carb diet    Benign hypertension  controlled. Continue current regimen    Hyperlipidemia, unspecified hyperlipidemia type    Vertigo  controlled. Continue current regimen  F/u with ENT after cataract surgery if                Side effects of medication(s) were discussed in detail and patient voiced understanding.  Patient will call back for any issues or complications.

## 2018-09-07 DIAGNOSIS — F32.A DEPRESSION, UNSPECIFIED DEPRESSION TYPE: ICD-10-CM

## 2018-09-07 RX ORDER — DIAZEPAM 2 MG/1
2 TABLET ORAL EVERY 12 HOURS PRN
Qty: 60 TABLET | Refills: 2 | Status: SHIPPED | OUTPATIENT
Start: 2018-09-07 | End: 2018-12-10 | Stop reason: SDUPTHER

## 2018-09-07 NOTE — TELEPHONE ENCOUNTER
----- Message from Deirdre Vigil sent at 9/7/2018 10:02 AM CDT -----  Contact: pt  Can the clinic reply in [facility]: yes      Please refill the medication(s) listed below. The patient can be reached at this phone number  once it is called into the pharmacy. Pt states the pharmacy below is the only one that has the medication so can the doctor please call her in a new Rx to that pharmacy,she needs it by Monday morning. [phone]      Medication #1diazePAM (VALIUM) 2 MG tablet           Medication #2      Preferred Pharmacy:[facility] [address] [place] [address] AT [facility] -[phone] (Phone)  [phone] (Fax)

## 2018-09-10 ENCOUNTER — PATIENT MESSAGE (OUTPATIENT)
Dept: INTERNAL MEDICINE | Facility: CLINIC | Age: 63
End: 2018-09-10

## 2018-09-10 DIAGNOSIS — F32.A DEPRESSION, UNSPECIFIED DEPRESSION TYPE: ICD-10-CM

## 2018-09-26 ENCOUNTER — OFFICE VISIT (OUTPATIENT)
Dept: OPTOMETRY | Facility: CLINIC | Age: 63
End: 2018-09-26
Attending: INTERNAL MEDICINE
Payer: MEDICARE

## 2018-09-26 DIAGNOSIS — I10 ESSENTIAL HYPERTENSION: ICD-10-CM

## 2018-09-26 DIAGNOSIS — H25.042 POSTERIOR SUBCAPSULAR AGE-RELATED CATARACT OF LEFT EYE: Primary | ICD-10-CM

## 2018-09-26 DIAGNOSIS — H25.13 NUCLEAR SCLEROSIS OF BOTH EYES: ICD-10-CM

## 2018-09-26 DIAGNOSIS — H52.13 MYOPIA WITH PRESBYOPIA OF BOTH EYES: ICD-10-CM

## 2018-09-26 DIAGNOSIS — H52.4 MYOPIA WITH PRESBYOPIA OF BOTH EYES: ICD-10-CM

## 2018-09-26 PROCEDURE — 99999 PR PBB SHADOW E&M-EST. PATIENT-LVL II: CPT | Mod: PBBFAC,,, | Performed by: OPTOMETRIST

## 2018-09-26 PROCEDURE — 92014 COMPRE OPH EXAM EST PT 1/>: CPT | Mod: S$PBB,,, | Performed by: OPTOMETRIST

## 2018-09-26 PROCEDURE — 99212 OFFICE O/P EST SF 10 MIN: CPT | Mod: PBBFAC | Performed by: OPTOMETRIST

## 2018-09-26 NOTE — LETTER
September 26, 2018      Jean Carlos Samuels MD  [address]  [address]  [facility] [address]           [place] [facility]  [address]  [facility] [address]  Phone: [phone]  Fax: [phone]          Patient: Lucas Mayer   MR Number: [id number]   YOB: 1955   Date of Visit: 9/26/2018       Dear Dr. Jean Carlos Samuels:    Thank you for referring Lucas Mayer to me for evaluation. Attached you will find relevant portions of my assessment and plan of care.    If you have questions, please do not hesitate to call me. I look forward to following Lucas Mayer along with you.    Sincerely,    Diana Zhang, OD    Enclosure  CC:  No Recipients    If you would like to receive this communication electronically, please contact [email or web address] or [phone] to request more information on [employer] Link access.    For providers and/or their staff who would like to refer a patient to [facility], please contact us through our one-stop-shop provider referral line, [facility], at [phone].    If you feel you have received this communication in error or would no longer like to receive these types of communications, please e-mail [email or web address]

## 2018-09-26 NOTE — PROGRESS NOTES
HPI     Patient has concerns with OS cataract. Pt stats about 6 mos ago she   noticed a decrease in va in OS. She is not interested in new rx glasses.    Pt reports dryness any tearing, occasional burning. Pt sts painful OU.     Gtts: Refresh PRN OU    Last edited by Abhishek Smith MA on 9/26/2018 10:22 AM. (History)            Assessment /Plan     For exam results, see Encounter Report.    Posterior subcapsular age-related cataract of left eye    Nuclear sclerosis of both eyes    Essential hypertension    Myopia with presbyopia of both eyes          1-2.  L>>R  Educated on cataracts and affects on vision.  Consult Dr. Foreman for cataract surgery.  3.  No retinopathy--monitor yearly.  BP control.  4.  Continue w/ current rx

## 2018-10-09 ENCOUNTER — INITIAL CONSULT (OUTPATIENT)
Dept: OPHTHALMOLOGY | Facility: CLINIC | Age: 63
End: 2018-10-09
Attending: OPHTHALMOLOGY
Payer: MEDICARE

## 2018-10-09 DIAGNOSIS — H25.12 NUCLEAR SCLEROSIS OF LEFT EYE: Primary | ICD-10-CM

## 2018-10-09 PROCEDURE — 99213 OFFICE O/P EST LOW 20 MIN: CPT | Mod: PBBFAC,25 | Performed by: OPHTHALMOLOGY

## 2018-10-09 PROCEDURE — 92014 COMPRE OPH EXAM EST PT 1/>: CPT | Mod: S$PBB,,, | Performed by: OPHTHALMOLOGY

## 2018-10-09 PROCEDURE — 92136 OPHTHALMIC BIOMETRY: CPT | Mod: PBBFAC,LT | Performed by: OPHTHALMOLOGY

## 2018-10-09 PROCEDURE — 99999 PR PBB SHADOW E&M-EST. PATIENT-LVL III: CPT | Mod: PBBFAC,,, | Performed by: OPHTHALMOLOGY

## 2018-10-09 RX ORDER — PHENYLEPHRINE HYDROCHLORIDE 25 MG/ML
1 SOLUTION/ DROPS OPHTHALMIC
Status: CANCELLED | OUTPATIENT
Start: 2018-10-09

## 2018-10-09 RX ORDER — MOXIFLOXACIN 5 MG/ML
1 SOLUTION/ DROPS OPHTHALMIC
Status: CANCELLED | OUTPATIENT
Start: 2018-10-09

## 2018-10-09 RX ORDER — TROPICAMIDE 10 MG/ML
1 SOLUTION/ DROPS OPHTHALMIC
Status: CANCELLED | OUTPATIENT
Start: 2018-10-09

## 2018-10-09 RX ORDER — TETRACAINE HYDROCHLORIDE 5 MG/ML
1 SOLUTION OPHTHALMIC
Status: CANCELLED | OUTPATIENT
Start: 2018-10-09

## 2018-10-09 NOTE — LETTER
October 9, 2018      Diana Zhang, OD  [address]  [facility] [address]           [facility]  [address]  [facility] [address]  Phone: [phone]  Fax: [phone]          Patient: Lucas Mayer   MR Number: [id number]   YOB: 1955   Date of Visit: 10/9/2018       Dear Dr. Diana Zhang:    Thank you for referring Lucas Mayer to me for evaluation. Attached you will find relevant portions of my assessment and plan of care.    If you have questions, please do not hesitate to call me. I look forward to following Lucas Mayer along with you.    Sincerely,    Emilie Foreman MD    Enclosure  CC:  No Recipients    If you would like to receive this communication electronically, please contact [email or web address] or [phone] to request more information on [employer] access.    For providers and/or their staff who would like to refer a patient to [facility], please contact us through our one-stop-shop provider referral line, [facility], at [phone].    If you feel you have received this communication in error or would no longer like to receive these types of communications, please e-mail [email or web address]

## 2018-10-09 NOTE — PROGRESS NOTES
HPI     DLS - 09/26/18 Dr. Zhang here today for a cataract evaluation       Patient states that the cataract in the OS is matured enough to have   something done.  Also suffer with dry eyes and get these shooting pains in   them every so often.  No pain today , use to get flashes but not severe,   admits to floaters.  Everything to the left is foggy.  Glare bothers eyes   at night and bought glasses that have the glare resistant in them.  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    Gtts: Refresh PRN OU    Last edited by Sia Fletcher MA on 10/9/2018  2:51 PM. (History)            Assessment /Plan     For exam results, see Encounter Report.    Nuclear sclerosis of left eye  -     IOL Master - MOD 26 - OS - Left eye      Visually Significant Cataract: Patient reports decreased vision consistent with the clinical amount of lenticular opacity, which reaches the level of visual significance and affects activities of daily living. Risks, benefits, and alternatives to cataract surgery were discussed and the consent reviewed. IOL options were discussed, including ATIOLs and the associated side effects and additional patient cost associated with them.   IOL Selections:   Right eye  IOL: pcboo 18.0     Left eye  IOL: pcboo 18.5    Pt wishes to have left eye done first.  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

## 2018-10-10 ENCOUNTER — TELEPHONE (OUTPATIENT)
Dept: OPHTHALMOLOGY | Facility: CLINIC | Age: 63
End: 2018-10-10

## 2018-10-10 DIAGNOSIS — H25.12 NUCLEAR SCLEROTIC CATARACT OF LEFT EYE: Primary | ICD-10-CM

## 2018-10-10 RX ORDER — PREDNISOLONE ACETATE-GATIFLOXACIN-BROMFENAC .75; 5; 1 MG/ML; MG/ML; MG/ML
1 SUSPENSION/ DROPS OPHTHALMIC 3 TIMES DAILY
Qty: 7 ML | Refills: 3 | Status: SHIPPED | OUTPATIENT
Start: 2018-10-10 | End: 2019-04-09 | Stop reason: ALTCHOICE

## 2018-11-14 ENCOUNTER — PATIENT MESSAGE (OUTPATIENT)
Dept: SURGERY | Facility: OTHER | Age: 63
End: 2018-11-14

## 2018-11-29 ENCOUNTER — TELEPHONE (OUTPATIENT)
Dept: OPTOMETRY | Facility: CLINIC | Age: 63
End: 2018-11-29

## 2018-12-03 ENCOUNTER — ANESTHESIA EVENT (OUTPATIENT)
Dept: SURGERY | Facility: OTHER | Age: 63
End: 2018-12-03
Payer: MEDICARE

## 2018-12-03 ENCOUNTER — ANESTHESIA (OUTPATIENT)
Dept: SURGERY | Facility: OTHER | Age: 63
End: 2018-12-03
Payer: MEDICARE

## 2018-12-03 ENCOUNTER — HOSPITAL ENCOUNTER (OUTPATIENT)
Facility: OTHER | Age: 63
Discharge: HOME | End: 2018-12-03
Attending: OPHTHALMOLOGY | Admitting: OPHTHALMOLOGY
Payer: MEDICARE

## 2018-12-03 VITALS
DIASTOLIC BLOOD PRESSURE: 73 MMHG | HEART RATE: 56 BPM | OXYGEN SATURATION: 96 % | WEIGHT: 185 LBS | BODY MASS INDEX: 34.04 KG/M2 | SYSTOLIC BLOOD PRESSURE: 143 MMHG | RESPIRATION RATE: 18 BRPM | TEMPERATURE: 98 F | HEIGHT: 62 IN

## 2018-12-03 DIAGNOSIS — H25.12 NUCLEAR SCLEROSIS OF LEFT EYE: ICD-10-CM

## 2018-12-03 PROCEDURE — V2632 POST CHMBR INTRAOCULAR LENS: HCPCS | Performed by: OPHTHALMOLOGY

## 2018-12-03 PROCEDURE — 66984 XCAPSL CTRC RMVL W/O ECP: CPT | Mod: LT,,, | Performed by: OPHTHALMOLOGY

## 2018-12-03 PROCEDURE — 37000008 HC ANESTHESIA 1ST 15 MINUTES: Performed by: OPHTHALMOLOGY

## 2018-12-03 PROCEDURE — 36000707: Performed by: OPHTHALMOLOGY

## 2018-12-03 PROCEDURE — 36000706: Performed by: OPHTHALMOLOGY

## 2018-12-03 PROCEDURE — 25000003 PHARM REV CODE 250: Performed by: OPHTHALMOLOGY

## 2018-12-03 PROCEDURE — 63600175 PHARM REV CODE 636 W HCPCS: Performed by: NURSE ANESTHETIST, CERTIFIED REGISTERED

## 2018-12-03 PROCEDURE — 71000015 HC POSTOP RECOV 1ST HR: Performed by: OPHTHALMOLOGY

## 2018-12-03 PROCEDURE — 37000009 HC ANESTHESIA EA ADD 15 MINS: Performed by: OPHTHALMOLOGY

## 2018-12-03 DEVICE — LENS IOL ITEC PRELOAD 18.5D: Type: IMPLANTABLE DEVICE | Site: EYE | Status: FUNCTIONAL

## 2018-12-03 RX ORDER — TROPICAMIDE 10 MG/ML
1 SOLUTION/ DROPS OPHTHALMIC
Status: COMPLETED | OUTPATIENT
Start: 2018-12-03 | End: 2018-12-03

## 2018-12-03 RX ORDER — PROPARACAINE HYDROCHLORIDE 5 MG/ML
1 SOLUTION/ DROPS OPHTHALMIC
Status: DISCONTINUED | OUTPATIENT
Start: 2018-12-03 | End: 2018-12-03 | Stop reason: HOSPADM

## 2018-12-03 RX ORDER — MIDAZOLAM HYDROCHLORIDE 1 MG/ML
INJECTION INTRAMUSCULAR; INTRAVENOUS
Status: DISCONTINUED | OUTPATIENT
Start: 2018-12-03 | End: 2018-12-03

## 2018-12-03 RX ORDER — TETRACAINE HYDROCHLORIDE 5 MG/ML
SOLUTION OPHTHALMIC
Status: DISCONTINUED | OUTPATIENT
Start: 2018-12-03 | End: 2018-12-03 | Stop reason: HOSPADM

## 2018-12-03 RX ORDER — MOXIFLOXACIN 5 MG/ML
SOLUTION/ DROPS OPHTHALMIC
Status: DISCONTINUED | OUTPATIENT
Start: 2018-12-03 | End: 2018-12-03 | Stop reason: HOSPADM

## 2018-12-03 RX ORDER — TETRACAINE HYDROCHLORIDE 5 MG/ML
1 SOLUTION OPHTHALMIC
Status: COMPLETED | OUTPATIENT
Start: 2018-12-03 | End: 2018-12-03

## 2018-12-03 RX ORDER — PHENYLEPHRINE HYDROCHLORIDE 25 MG/ML
1 SOLUTION/ DROPS OPHTHALMIC
Status: COMPLETED | OUTPATIENT
Start: 2018-12-03 | End: 2018-12-03

## 2018-12-03 RX ORDER — LIDOCAINE HYDROCHLORIDE 40 MG/ML
INJECTION, SOLUTION RETROBULBAR
Status: DISCONTINUED | OUTPATIENT
Start: 2018-12-03 | End: 2018-12-03 | Stop reason: HOSPADM

## 2018-12-03 RX ORDER — ACETAMINOPHEN 325 MG/1
650 TABLET ORAL EVERY 4 HOURS PRN
Status: DISCONTINUED | OUTPATIENT
Start: 2018-12-03 | End: 2018-12-03 | Stop reason: HOSPADM

## 2018-12-03 RX ORDER — MOXIFLOXACIN 5 MG/ML
1 SOLUTION/ DROPS OPHTHALMIC
Status: COMPLETED | OUTPATIENT
Start: 2018-12-03 | End: 2018-12-03

## 2018-12-03 RX ADMIN — TETRACAINE HYDROCHLORIDE 1 DROP: 5 SOLUTION OPHTHALMIC at 07:12

## 2018-12-03 RX ADMIN — MOXIFLOXACIN HYDROCHLORIDE 1 DROP: 5 SOLUTION/ DROPS OPHTHALMIC at 07:12

## 2018-12-03 RX ADMIN — MOXIFLOXACIN HYDROCHLORIDE 1 DROP: 5 SOLUTION/ DROPS OPHTHALMIC at 09:12

## 2018-12-03 RX ADMIN — PHENYLEPHRINE HYDROCHLORIDE 1 DROP: 25 SOLUTION/ DROPS OPHTHALMIC at 07:12

## 2018-12-03 RX ADMIN — MIDAZOLAM HYDROCHLORIDE 2 MG: 1 INJECTION, SOLUTION INTRAMUSCULAR; INTRAVENOUS at 08:12

## 2018-12-03 RX ADMIN — TROPICAMIDE 1 DROP: 10 SOLUTION/ DROPS OPHTHALMIC at 07:12

## 2018-12-03 NOTE — ANESTHESIA POSTPROCEDURE EVALUATION
"Anesthesia Post Evaluation    Patient: Lucas Mayer    Procedure(s) Performed: Procedure(s) (LRB):  EXTRACTION, CATARACT, WITH IOL INSERTION (Left)    Final Anesthesia Type: MAC  Patient location during evaluation: [facility]  Patient participation: Yes- Able to Participate  Level of consciousness: awake and alert  Post-procedure vital signs: reviewed and stable  Pain management: adequate  Airway patency: patent  PONV status at discharge: No PONV  Anesthetic complications: no      Cardiovascular status: blood pressure returned to baseline  Respiratory status: unassisted  Hydration status: euvolemic  Follow-up not needed.        Visit Vitals  BP (!) 153/81 (BP Location: Right arm, Patient Position: Lying)   Pulse 60   Temp 36.7 °C (98.1 °F) (Oral)   Resp 18   Ht 5' 2" (1.575 m)   Wt 83.9 kg (185 lb)   SpO2 97%   Breastfeeding? No   BMI 33.84 kg/m²       Pain/Mouna Score: Pain Assessment Performed: Yes (12/3/2018  7:19 AM)  Presence of Pain: denies (12/3/2018  7:19 AM)        "

## 2018-12-03 NOTE — DISCHARGE INSTRUCTIONS
Anesthesia: Monitored Anesthesia Care (MAC)    Youre due to have surgery. During surgery, youll be given medicine called anesthesia. This will keep you comfortable and pain-free. Your surgeon will use monitored anesthesia care (MAC). This sheet tells you more about this type of anesthesia.  What is monitored anesthesia care?  MAC keeps you very drowsy during surgery. You may be awake, but you will likely not remember much. And you wont feel pain. With MAC, medicines are given through an IV line into a vein in your arm or hand. A local anesthetic will usually be injected into the skin and muscle around the surgical site to numb it. The anesthesia provider monitors you during the procedure. He or she checks your heart rate and rhythm, blood pressure, and blood oxygen level.  Anesthesia tools and medicines that may be near you during your procedure  You will likely have:  · A pulse oximeter on the end of your finger. This measures your blood oxygen level.  · Electrocardiography leads (electrodes) on your chest. These record your heart rate and rhythm.  · Medicines given through an IV. These relax you and prevent pain. You may be awake or sleep lightly. If you have local anesthetic, it is injected directly into your skin.  · A facemask to give you oxygen, if needed.  Risks and possible complications  MAC has some risks. These include:  · Breathing problems  · Nausea and vomiting  · Allergic reaction to the anesthetic    Anesthesia safety  Tips for anesthesia safety include the following:   · Follow all instructions you are given for how long not to eat or drink before your procedure.  · Be sure your healthcare provider knows what medicines you take, especially any anti-inflammatory medicine or blood thinners. This includes aspirin and any other over-the-counter medicines, herbs, and supplements.  · Have an adult family member or friend drive you home after the procedure.  · For the first 24 hours after your  surgery:  ¨ Do not drive or use heavy equipment.  ¨ Do not make important decisions or sign documents.  ¨ Avoid alcohol.  ¨ Have someone stay with you, if possible. They can watch for problems and help keep you safe.  Date Last Reviewed: 12/1/2016  © 6731-0091 [employer]. [address] [place] [address]. All rights reserved. This information is not intended as a substitute for professional medical care. Always follow your healthcare professional's instructions.    Home Care Instructions for Eye Surgeries    1. ACTIVITY:   Limit your activity today. Increase activity gradually. You may return to work or school as directed by your physician.    2. DIET:   Drink plenty of fluids. Resume your normal diet unless instructed otherwise.  3. PAIN:   Expect a moderate amount of pain. If a prescription for pain is not sent home with you, you may take your commonly used pain reliever as directed. If this is not sufficient, call your physician. You may resume any other prescription medication unless otherwise directed by your physician.     4. DRESSING:   Keep your dressing dry and intact.  5. COMMENTS:   No driving, operating heavy machinery or signing legal documents for 24 hours.    No bending forward at the waist.   See attached schedule of eye drops.    Discuss any problem with your physician as soon as it arises. Do not Delay.      EMERGENCY- If you are unable to contact your physician, please go to the nearest Emergency Room.

## 2018-12-03 NOTE — DISCHARGE SUMMARY
Outcome: Successful outpatient ophthalmic surgical procedure  Preprinted Instructions given to patient.  Regular diet.  Activity: No restrictions  Meds: see Med Rec  Condition: stable  Follow up: 1 day with Dr Foreman  Disposition: Home  Diagnosis: s/p eye surgery

## 2018-12-03 NOTE — ANESTHESIA PREPROCEDURE EVALUATION
12/03/2018  Lucas Mayer is a 63 y.o., female.    Anesthesia Evaluation    I have reviewed the Patient Summary Reports.    I have reviewed the Nursing Notes.   I have reviewed the Medications.     Review of Systems  Anesthesia Hx:  Denies Family Hx of Anesthesia complications.  Personal Hx of Anesthesia complications Slow To Awaken/Delayed Emergence   Social:  Non-Smoker    Hematology/Oncology:     Oncology Normal     Cardiovascular:   Hypertension hyperlipidemia    Pulmonary:  Pulmonary Normal    Renal/:  Renal/ Normal     Hepatic/GI:  Hepatic/GI Normal    Neurological:   Vertigo   Psych:   depression          Physical Exam  General:  Obesity    Airway/Jaw/Neck:  Airway Findings: Mouth Opening: Normal Tongue: Normal  General Airway Assessment: Adult  Mallampati: II  TM Distance: Normal, at least 6 cm       Chest/Lungs:  Chest/Lungs Findings:        Mental Status:  Mental Status Findings:  Alert and Oriented, Cooperative         Anesthesia Plan  Type of Anesthesia, risks & benefits discussed:  Anesthesia Type:  MAC  Patient's Preference:   Intra-op Monitoring Plan: standard ASA monitors  Intra-op Monitoring Plan Comments:   Post Op Pain Control Plan:   Post Op Pain Control Plan Comments:   Induction:    Beta Blocker:         Informed Consent: Patient understands risks and agrees with Anesthesia plan.  Questions answered. Anesthesia consent signed with patient.  ASA Score: 2     Day of Surgery Review of History & Physical:    H&P update referred to the surgeon.         Ready For Surgery From Anesthesia Perspective.

## 2018-12-03 NOTE — PLAN OF CARE
Lucas Mayer has met all discharge criteria from Phase II. Vital Signs are stable, ambulating  without difficulty. Discharge instructions given, patient verbalized understanding. Discharged from facility via wheelchair in stable condition.

## 2018-12-03 NOTE — OP NOTE
SURGEON:  Emilie Foreman M.D.    PREOPERATIVE DIAGNOSIS:    Nuclear Sclerotic Cataract Left Eye    POSTOPERATIVE DIAGNOSIS:    Nuclear Sclerotic Cataract Left Eye    PROCEDURES:    Phacoemulsification with  intraocular lens, Left eye (94708)    DATE OF SURGERY: 12/03/2018    IMPLANT: pcboo 18.5    ANESTHESIA:  MAC with topical Lidocaine    COMPLICATIONS:  None    ESTIMATED BLOOD LOSS: None    SPECIMENS: None    INDICATIONS:    The patient has a history of painless progressive visual loss and  difficulty with activities of daily living secondary to cataract formation.  After a thorough discussion of the risks, benefits, and alternatives to cataract surgery, including, but not limited to, the rare risks of infection, retinal detachment, hemorrhage, need for additional surgery, loss of vision, and even loss of the eye, the patient voices understanding and desires to proceed.    DESCRIPTION OF PROCEDURE:    The patients IOL calculations were reviewed, and the lens selection confirmed.   After verification and marking of the proper eye in the preop holding area, the patient was brought to the operating room in supine position where the eye was prepped and draped in standard sterile fashion with 5% Betadine and a lid speculum placed in the eye.   Topical 4% Lidocaine was used in addition to the preoperative anesthesia and the procedure was begun by the creation of a paracentesis incision through which viscoelastic was used to fill the anterior chamber.  Next, a keratome blade was used to create a triplanar temporal clear corneal incision and a cystotome and Utrata forceps used to fashion a continuous curvilinear capsulorrhexis.  Hydrodissection was carried out using the Cheatham hydrodissection cannula and the nucleus was found to be mobile.  Phacoemulsification of the nucleus was carried out using a quick chop technique, and all remaining epinuclear and cortical material was removed.  The eye was then reformed with  Viscoelastic and the  intraocular lens was implanted into the capsular bag.  All remaining viscoelastics were removed from the eye and at the end of the case the pupil was round, the lens was well-centered within the capsular bag and all wounds were found to be water tight.  Drops of Vigamox and Pred Forte were instilled and a shield was placed over the eye. The patient will follow up with Dr. Foreman in the morning.

## 2018-12-04 ENCOUNTER — OFFICE VISIT (OUTPATIENT)
Dept: OPHTHALMOLOGY | Facility: CLINIC | Age: 63
End: 2018-12-04
Attending: OPHTHALMOLOGY
Payer: MEDICARE

## 2018-12-04 DIAGNOSIS — H25.12 NUCLEAR SCLEROTIC CATARACT OF LEFT EYE: ICD-10-CM

## 2018-12-04 DIAGNOSIS — Z98.890 POST-OPERATIVE STATE: Primary | ICD-10-CM

## 2018-12-04 PROCEDURE — 99024 POSTOP FOLLOW-UP VISIT: CPT | Mod: POP,,, | Performed by: OPHTHALMOLOGY

## 2018-12-04 PROCEDURE — 99212 OFFICE O/P EST SF 10 MIN: CPT | Mod: PBBFAC | Performed by: OPHTHALMOLOGY

## 2018-12-04 PROCEDURE — 99999 PR PBB SHADOW E&M-EST. PATIENT-LVL II: CPT | Mod: PBBFAC,,, | Performed by: OPHTHALMOLOGY

## 2018-12-04 NOTE — PROGRESS NOTES
HPI     Post-op Evaluation      Additional comments: 1 day check.               Comments     POD 1 CE with IOL OS  12/03/2018   Hx of oscillopsia from neuro damage.    Pt states eye is doing remarkably well.  No pain or discomfort today.    Some FBS yesterday.   Now seeing better OS than OD.                Last edited by Hetal Lowe on 12/4/2018 10:10 AM. (History)            Assessment /Plan     For exam results, see Encounter Report.    Post-operative state    Nuclear sclerotic cataract of left eye      Slit lamp exam:  L/L: nl  K: clear, wound sealed  AC: 1+ cell  Lens: IOL centered and stable    POD1 s/p Phaco/IOL  Appropriate precautions and post op medications reviewed.  Patient instructed to call or come in if symptoms of redness, decreased vision, or pain are experienced.

## 2018-12-10 ENCOUNTER — PATIENT MESSAGE (OUTPATIENT)
Dept: OPHTHALMOLOGY | Facility: CLINIC | Age: 63
End: 2018-12-10

## 2018-12-10 DIAGNOSIS — F32.A DEPRESSION, UNSPECIFIED DEPRESSION TYPE: ICD-10-CM

## 2018-12-10 RX ORDER — DIAZEPAM 2 MG/1
2 TABLET ORAL EVERY 12 HOURS PRN
Qty: 60 TABLET | Refills: 2 | Status: SHIPPED | OUTPATIENT
Start: 2018-12-10 | End: 2019-03-12 | Stop reason: SDUPTHER

## 2018-12-11 ENCOUNTER — OFFICE VISIT (OUTPATIENT)
Dept: OPHTHALMOLOGY | Facility: CLINIC | Age: 63
End: 2018-12-11
Attending: OPHTHALMOLOGY
Payer: MEDICARE

## 2018-12-11 DIAGNOSIS — Z98.890 POST-OPERATIVE STATE: Primary | ICD-10-CM

## 2018-12-11 DIAGNOSIS — H25.13 NUCLEAR SCLEROSIS, BILATERAL: ICD-10-CM

## 2018-12-11 PROCEDURE — 99212 OFFICE O/P EST SF 10 MIN: CPT | Mod: PBBFAC | Performed by: OPHTHALMOLOGY

## 2018-12-11 PROCEDURE — 99999 PR PBB SHADOW E&M-EST. PATIENT-LVL II: CPT | Mod: PBBFAC,,, | Performed by: OPHTHALMOLOGY

## 2018-12-11 PROCEDURE — 99024 POSTOP FOLLOW-UP VISIT: CPT | Mod: POP,,, | Performed by: OPHTHALMOLOGY

## 2018-12-11 RX ORDER — MOXIFLOXACIN 5 MG/ML
1 SOLUTION/ DROPS OPHTHALMIC
Status: CANCELLED | OUTPATIENT
Start: 2018-12-11

## 2018-12-11 RX ORDER — TETRACAINE HYDROCHLORIDE 5 MG/ML
1 SOLUTION OPHTHALMIC
Status: CANCELLED | OUTPATIENT
Start: 2018-12-11

## 2018-12-11 RX ORDER — PHENYLEPHRINE HYDROCHLORIDE 25 MG/ML
1 SOLUTION/ DROPS OPHTHALMIC
Status: CANCELLED | OUTPATIENT
Start: 2018-12-11

## 2018-12-11 RX ORDER — TROPICAMIDE 10 MG/ML
1 SOLUTION/ DROPS OPHTHALMIC
Status: CANCELLED | OUTPATIENT
Start: 2018-12-11

## 2018-12-11 NOTE — PROGRESS NOTES
HPI     Post-op Evaluation      Additional comments: 1 week post op              Comments     DLS 12/4/18 OS feels heavy. 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           Last edited by Tanya Lima on 12/11/2018 10:23 AM. (History)            Assessment /Plan     For exam results, see Encounter Report.    Post-operative state    Nuclear sclerosis, bilateral      Slit lamp exam:  L/L: nl  K: clear, wound sealed  AC: trace cell  Iris/Lens: IOL centered and stable    POW1 s/p phaco: Surgery healing well with no signs of infection or abnormal inflammation.    Patient wishes to proceed with surgery in the second eye. Risks, benefits, alternatives reviewed. IOL selection reviewed.     Right eye  IOL: pcboo 18.0

## 2018-12-13 ENCOUNTER — TELEPHONE (OUTPATIENT)
Dept: OPHTHALMOLOGY | Facility: CLINIC | Age: 63
End: 2018-12-13

## 2018-12-13 DIAGNOSIS — H25.11 NUCLEAR SCLEROTIC CATARACT OF RIGHT EYE: Primary | ICD-10-CM

## 2019-01-31 ENCOUNTER — TELEPHONE (OUTPATIENT)
Dept: OPTOMETRY | Facility: CLINIC | Age: 64
End: 2019-01-31

## 2019-02-04 ENCOUNTER — ANESTHESIA EVENT (OUTPATIENT)
Dept: SURGERY | Facility: OTHER | Age: 64
End: 2019-02-04
Payer: MEDICARE

## 2019-02-04 ENCOUNTER — HOSPITAL ENCOUNTER (OUTPATIENT)
Facility: OTHER | Age: 64
Discharge: HOME | End: 2019-02-04
Attending: OPHTHALMOLOGY | Admitting: OPHTHALMOLOGY
Payer: MEDICARE

## 2019-02-04 ENCOUNTER — ANESTHESIA (OUTPATIENT)
Dept: SURGERY | Facility: OTHER | Age: 64
End: 2019-02-04
Payer: MEDICARE

## 2019-02-04 VITALS
HEART RATE: 57 BPM | DIASTOLIC BLOOD PRESSURE: 63 MMHG | OXYGEN SATURATION: 96 % | HEIGHT: 62 IN | BODY MASS INDEX: 34.04 KG/M2 | RESPIRATION RATE: 18 BRPM | WEIGHT: 185 LBS | TEMPERATURE: 98 F | SYSTOLIC BLOOD PRESSURE: 117 MMHG

## 2019-02-04 DIAGNOSIS — H25.11 NUCLEAR SCLEROTIC CATARACT OF RIGHT EYE: Primary | ICD-10-CM

## 2019-02-04 PROCEDURE — 66984 XCAPSL CTRC RMVL W/O ECP: CPT | Mod: 79,RT,, | Performed by: OPHTHALMOLOGY

## 2019-02-04 PROCEDURE — 66984 PR REMOVAL, CATARACT, W/INSRT INTRAOC LENS, W/O ENDO CYCLO: ICD-10-PCS | Mod: 79,RT,, | Performed by: OPHTHALMOLOGY

## 2019-02-04 PROCEDURE — 63600175 PHARM REV CODE 636 W HCPCS: Performed by: NURSE ANESTHETIST, CERTIFIED REGISTERED

## 2019-02-04 PROCEDURE — 25000003 PHARM REV CODE 250: Performed by: OPHTHALMOLOGY

## 2019-02-04 PROCEDURE — 71000015 HC POSTOP RECOV 1ST HR: Performed by: OPHTHALMOLOGY

## 2019-02-04 PROCEDURE — 36000706: Performed by: OPHTHALMOLOGY

## 2019-02-04 PROCEDURE — 36000707: Performed by: OPHTHALMOLOGY

## 2019-02-04 PROCEDURE — 37000009 HC ANESTHESIA EA ADD 15 MINS: Performed by: OPHTHALMOLOGY

## 2019-02-04 PROCEDURE — V2632 POST CHMBR INTRAOCULAR LENS: HCPCS | Performed by: OPHTHALMOLOGY

## 2019-02-04 PROCEDURE — 37000008 HC ANESTHESIA 1ST 15 MINUTES: Performed by: OPHTHALMOLOGY

## 2019-02-04 DEVICE — LENS IOL ITEC PRELOAD 18.0D: Type: IMPLANTABLE DEVICE | Site: EYE | Status: FUNCTIONAL

## 2019-02-04 RX ORDER — TROPICAMIDE 10 MG/ML
1 SOLUTION/ DROPS OPHTHALMIC
Status: COMPLETED | OUTPATIENT
Start: 2019-02-04 | End: 2019-02-04

## 2019-02-04 RX ORDER — PHENYLEPHRINE HYDROCHLORIDE 25 MG/ML
1 SOLUTION/ DROPS OPHTHALMIC
Status: COMPLETED | OUTPATIENT
Start: 2019-02-04 | End: 2019-02-04

## 2019-02-04 RX ORDER — ACETAMINOPHEN 325 MG/1
650 TABLET ORAL EVERY 4 HOURS PRN
Status: DISCONTINUED | OUTPATIENT
Start: 2019-02-04 | End: 2019-02-04 | Stop reason: HOSPADM

## 2019-02-04 RX ORDER — MOXIFLOXACIN 5 MG/ML
1 SOLUTION/ DROPS OPHTHALMIC
Status: COMPLETED | OUTPATIENT
Start: 2019-02-04 | End: 2019-02-04

## 2019-02-04 RX ORDER — TETRACAINE HYDROCHLORIDE 5 MG/ML
SOLUTION OPHTHALMIC
Status: DISCONTINUED | OUTPATIENT
Start: 2019-02-04 | End: 2019-02-04 | Stop reason: HOSPADM

## 2019-02-04 RX ORDER — PROPARACAINE HYDROCHLORIDE 5 MG/ML
1 SOLUTION/ DROPS OPHTHALMIC
Status: DISCONTINUED | OUTPATIENT
Start: 2019-02-04 | End: 2019-02-04 | Stop reason: HOSPADM

## 2019-02-04 RX ORDER — MOXIFLOXACIN 5 MG/ML
SOLUTION/ DROPS OPHTHALMIC
Status: DISCONTINUED | OUTPATIENT
Start: 2019-02-04 | End: 2019-02-04 | Stop reason: HOSPADM

## 2019-02-04 RX ORDER — TETRACAINE HYDROCHLORIDE 5 MG/ML
1 SOLUTION OPHTHALMIC
Status: COMPLETED | OUTPATIENT
Start: 2019-02-04 | End: 2019-02-04

## 2019-02-04 RX ORDER — LIDOCAINE HYDROCHLORIDE 40 MG/ML
INJECTION, SOLUTION RETROBULBAR
Status: DISCONTINUED | OUTPATIENT
Start: 2019-02-04 | End: 2019-02-04 | Stop reason: HOSPADM

## 2019-02-04 RX ORDER — MIDAZOLAM HYDROCHLORIDE 1 MG/ML
INJECTION INTRAMUSCULAR; INTRAVENOUS
Status: DISCONTINUED | OUTPATIENT
Start: 2019-02-04 | End: 2019-02-04

## 2019-02-04 RX ADMIN — MIDAZOLAM HYDROCHLORIDE 1 MG: 1 INJECTION, SOLUTION INTRAMUSCULAR; INTRAVENOUS at 11:02

## 2019-02-04 RX ADMIN — TETRACAINE HYDROCHLORIDE 1 DROP: 5 SOLUTION OPHTHALMIC at 10:02

## 2019-02-04 RX ADMIN — MOXIFLOXACIN 1 DROP: 5 SOLUTION/ DROPS OPHTHALMIC at 12:02

## 2019-02-04 RX ADMIN — PHENYLEPHRINE HYDROCHLORIDE 1 DROP: 25 SOLUTION/ DROPS OPHTHALMIC at 10:02

## 2019-02-04 RX ADMIN — MOXIFLOXACIN 1 DROP: 5 SOLUTION/ DROPS OPHTHALMIC at 10:02

## 2019-02-04 RX ADMIN — TROPICAMIDE 1 DROP: 10 SOLUTION/ DROPS OPHTHALMIC at 10:02

## 2019-02-04 RX ADMIN — MIDAZOLAM HYDROCHLORIDE 2 MG: 1 INJECTION, SOLUTION INTRAMUSCULAR; INTRAVENOUS at 11:02

## 2019-02-04 NOTE — ANESTHESIA POSTPROCEDURE EVALUATION
"Anesthesia Post Evaluation    Patient: Lucas Mayer    Procedure(s) Performed: Procedure(s) (LRB):  EXTRACTION, CATARACT, WITH IOL INSERTION (Right)    Final Anesthesia Type: MAC  Patient location during evaluation: [facility]  Patient participation: Yes- Able to Participate  Level of consciousness: awake and alert  Post-procedure vital signs: reviewed and stable  Pain management: adequate  Airway patency: patent  PONV status at discharge: No PONV  Anesthetic complications: no      Cardiovascular status: blood pressure returned to baseline  Respiratory status: unassisted, room air and spontaneous ventilation  Hydration status: euvolemic  Follow-up not needed.        Visit Vitals  BP (!) 141/66 (BP Location: Left arm, Patient Position: Lying)   Pulse 63   Temp 36.7 °C (98.1 °F) (Oral)   Resp 18   Ht 5' 2" (1.575 m)   Wt 83.9 kg (184 lb 16 oz)   SpO2 96%   Breastfeeding? No   BMI 33.84 kg/m²       Pain/Mouna Score: No Data Recorded      "

## 2019-02-04 NOTE — ANESTHESIA PREPROCEDURE EVALUATION
02/04/2019  Lucas Mayer is a 63 y.o., female.    Anesthesia Evaluation    I have reviewed the Patient Summary Reports.    I have reviewed the Nursing Notes.   I have reviewed the Medications.     Review of Systems  Anesthesia Hx:  History of prior surgery of interest to airway management or planning: Previous anesthesia: MAC  12/18 phaco, rec'd versed 4mg, did well with MAC.  Denies Family Hx of Anesthesia complications.  Personal Hx of Anesthesia complications Slow To Awaken/Delayed Emergence   Social:  Non-Smoker    Hematology/Oncology:     Oncology Normal     Cardiovascular:   Hypertension hyperlipidemia    Pulmonary:  Pulmonary Normal    Renal/:  Renal/ Normal     Hepatic/GI:  Hepatic/GI Normal    Neurological:   Vertigo   Psych:   depression          Physical Exam  General:  Obesity    Airway/Jaw/Neck:  Airway Findings: Mouth Opening: Normal Tongue: Normal  General Airway Assessment: Adult  Mallampati: II  TM Distance: Normal, at least 6 cm            Mental Status:  Mental Status Findings:  Alert and Oriented, Cooperative         Anesthesia Plan  Type of Anesthesia, risks & benefits discussed:  Anesthesia Type:  MAC  Patient's Preference:   Intra-op Monitoring Plan: standard ASA monitors  Intra-op Monitoring Plan Comments:   Post Op Pain Control Plan:   Post Op Pain Control Plan Comments:   Induction:    Beta Blocker:         Informed Consent: Patient understands risks and agrees with Anesthesia plan.  Questions answered. Anesthesia consent signed with patient.  ASA Score: 2     Day of Surgery Review of History & Physical:    H&P update referred to the surgeon.         Ready For Surgery From Anesthesia Perspective.

## 2019-02-04 NOTE — OP NOTE
SURGEON:  Emilie Foreman M.D.    PREOPERATIVE DIAGNOSIS:    Nuclear Sclerotic Cataract Right Eye    POSTOPERATIVE DIAGNOSIS:    Nuclear Sclerotic Cataract Right Eye    PROCEDURES:    Phacoemulsification with  intraocular lens, Right eye (37515)    DATE OF SURGERY: 02/04/2019    IMPLANT: pcboo 18.0    ANESTHESIA:  MAC with topical Lidocaine    COMPLICATIONS:  None    ESTIMATED BLOOD LOSS: None    SPECIMENS: None    INDICATIONS:    The patient has a history of painless progressive visual loss and  difficulty with activities of daily living secondary to cataract formation.  After a thorough discussion of the risks, benefits, and alternatives to cataract surgery, including, but not limited to, the rare risks of infection, retinal detachment, hemorrhage, need for additional surgery, loss of vision, and even loss of the eye, the patient voices understanding and desires to proceed.    DESCRIPTION OF PROCEDURE:    The patients IOL calculations were reviewed, and the lens selection confirmed.   After verification and marking of the proper eye in the preop holding area, the patient was brought to the operating room in supine position where the eye was prepped and draped in standard sterile fashion with 5% Betadine and a lid speculum placed in the eye.   Topical 4% Lidocaine was used in addition to the preoperative anesthesia and the procedure was begun by the creation of a paracentesis incision through which viscoelastic was used to fill the anterior chamber.  Next, a keratome blade was used to create a triplanar temporal clear corneal incision and a cystotome and Utrata forceps used to fashion a continuous curvilinear capsulorrhexis.  Hydrodissection was carried out using the Cheatham hydrodissection cannula and the nucleus was found to be mobile.  Phacoemulsification of the nucleus was carried out using a quick chop technique, and all remaining epinuclear and cortical material was removed.  The eye was then reformed with  Viscoelastic and the  intraocular lens was implanted into the capsular bag.  All remaining viscoelastics were removed from the eye and at the end of the case the pupil was round, the lens was well-centered within the capsular bag and all wounds were found to be water tight.  Drops of Vigamox and Pred Forte were instilled and a shield was placed over the eye. The patient will follow up with Dr. Foreman in the morning.

## 2019-02-04 NOTE — DISCHARGE INSTRUCTIONS
Home Care Instructions for Eye Surgeries    1. ACTIVITY:   Limit your activity today. Increase activity gradually. You may return to work or school as directed by your physician.    2. DIET:   Drink plenty of fluids. Resume your normal diet unless instructed otherwise.    3. PAIN:   Expect a moderate amount of pain. If a prescription for pain is not sent home with you, you may take your commonly used pain reliever as directed. If this is not sufficient, call your physician. You may resume any other prescription medication unless otherwise directed by your physician.     4. DRESSING:   Keep your dressing dry and intact.    5. COMMENTS:   No driving, operating heavy machinery or signing legal documents for 24 hours.    No bending forward at the waist.   See attached schedule of eye drops.    Discuss any problem with your physician as soon as it arises. Do not Delay.      EMERGENCY- If you are unable to contact your physician, please go to the nearest Emergency Room.         Anesthesia: Monitored Anesthesia Care (MAC)    Anesthesia Safety  · Have an adult family member or friend drive you home after the procedure.  · For the first 24 hours after your surgery:  ¨ Do not drive or use heavy equipment.  ¨ Do not make important decisions or sign documents.  ¨ Avoid alcohol.  ¨ Have someone stay with you, if possible. They can watch for problems and help keep you safe.    PLEASE FOLLOW ANY OTHER INSTRUCTIONS PROVIDED TO YOU BY DR. FRANKLIN!

## 2019-02-05 ENCOUNTER — OFFICE VISIT (OUTPATIENT)
Dept: OPHTHALMOLOGY | Facility: CLINIC | Age: 64
End: 2019-02-05
Attending: OPHTHALMOLOGY
Payer: MEDICARE

## 2019-02-05 DIAGNOSIS — Z98.890 POST-OPERATIVE STATE: Primary | ICD-10-CM

## 2019-02-05 PROCEDURE — 99024 POSTOP FOLLOW-UP VISIT: CPT | Mod: POP,,, | Performed by: OPHTHALMOLOGY

## 2019-02-05 PROCEDURE — 99999 PR PBB SHADOW E&M-EST. PATIENT-LVL II: ICD-10-PCS | Mod: PBBFAC,,, | Performed by: OPHTHALMOLOGY

## 2019-02-05 PROCEDURE — 99212 OFFICE O/P EST SF 10 MIN: CPT | Mod: PBBFAC | Performed by: OPHTHALMOLOGY

## 2019-02-05 PROCEDURE — 99024 PR POST-OP FOLLOW-UP VISIT: ICD-10-PCS | Mod: POP,,, | Performed by: OPHTHALMOLOGY

## 2019-02-05 PROCEDURE — 99999 PR PBB SHADOW E&M-EST. PATIENT-LVL II: CPT | Mod: PBBFAC,,, | Performed by: OPHTHALMOLOGY

## 2019-02-05 NOTE — PROGRESS NOTES
HPI     S/P Phaco w/IOL OD- 2/4/19  S/p Phaco w/IOL OS- 12/3/18    1 day po od    Pt states sx went well. Pt denies pain and discomfort. Pt c/o of halos and   blurry VA.     Last edited by Edith Reed on 2/5/2019  8:52 AM. (History)            Assessment /Plan     For exam results, see Encounter Report.    Post-operative state      Slit lamp exam:  L/L: nl  K: clear, wound sealed  AC: 1+ cell  Lens: IOL centered and stable    POD1 s/p Phaco/IOL  Appropriate precautions and post op medications reviewed.  Patient instructed to call or come in if symptoms of redness, decreased vision, or pain are experienced.

## 2019-03-01 ENCOUNTER — OFFICE VISIT (OUTPATIENT)
Dept: OPTOMETRY | Facility: CLINIC | Age: 64
End: 2019-03-01
Payer: MEDICARE

## 2019-03-01 DIAGNOSIS — Z98.42 S/P BILATERAL CATARACT EXTRACTION: Primary | ICD-10-CM

## 2019-03-01 DIAGNOSIS — Z98.41 S/P BILATERAL CATARACT EXTRACTION: Primary | ICD-10-CM

## 2019-03-01 PROCEDURE — 99212 OFFICE O/P EST SF 10 MIN: CPT | Mod: PBBFAC | Performed by: OPTOMETRIST

## 2019-03-01 PROCEDURE — 99024 POSTOP FOLLOW-UP VISIT: CPT | Mod: POP,,, | Performed by: OPTOMETRIST

## 2019-03-01 PROCEDURE — 99999 PR PBB SHADOW E&M-EST. PATIENT-LVL II: ICD-10-PCS | Mod: PBBFAC,,, | Performed by: OPTOMETRIST

## 2019-03-01 PROCEDURE — 99024 PR POST-OP FOLLOW-UP VISIT: ICD-10-PCS | Mod: POP,,, | Performed by: OPTOMETRIST

## 2019-03-01 PROCEDURE — 92134 CPTRZ OPH DX IMG PST SGM RTA: CPT | Mod: PBBFAC | Performed by: OPTOMETRIST

## 2019-03-01 PROCEDURE — 99999 PR PBB SHADOW E&M-EST. PATIENT-LVL II: CPT | Mod: PBBFAC,,, | Performed by: OPTOMETRIST

## 2019-03-01 NOTE — PROGRESS NOTES
HPI     Post-op Evaluation      Additional comments: 3 wk phaco OD              Comments     Last eye exam was 2/5/19 with Dr. Foreman.  Patient states seeing white halos since cataract sx. Using several pairs   of readers because she can't see anything up close. Didn't have any   problems after OS sx.    PGB TID OD    02/04/2019 IMPLANT: pcboo 18.0 OD  12/03/2018 IMPLANT: pcboo 18.5 OS          Last edited by Sydney Barrera on 3/1/2019  2:59 PM. (History)            Assessment /Plan     For exam results, see Encounter Report.    S/P bilateral cataract extraction  -     OCT- Retina            1.  Pt doing well.  No rx given.  Recommend +2.00 OTC readers.  No CME OU.  Retina flat and intact OU--no holes, tears, breaks, or RDs.  Educated pt on arc in the periphery--edge of lens.  Patient would like punctal plugs--RTC 1 month.

## 2019-03-12 ENCOUNTER — PATIENT MESSAGE (OUTPATIENT)
Dept: INTERNAL MEDICINE | Facility: CLINIC | Age: 64
End: 2019-03-12

## 2019-03-12 DIAGNOSIS — F32.A DEPRESSION, UNSPECIFIED DEPRESSION TYPE: ICD-10-CM

## 2019-03-12 RX ORDER — DIAZEPAM 2 MG/1
2 TABLET ORAL EVERY 12 HOURS PRN
Qty: 60 TABLET | Refills: 2 | Status: SHIPPED | OUTPATIENT
Start: 2019-03-12 | End: 2019-06-12 | Stop reason: SDUPTHER

## 2019-03-31 ENCOUNTER — PATIENT MESSAGE (OUTPATIENT)
Dept: INTERNAL MEDICINE | Facility: CLINIC | Age: 64
End: 2019-03-31

## 2019-04-01 ENCOUNTER — HOSPITAL ENCOUNTER (OUTPATIENT)
Dept: RADIOLOGY | Facility: HOSPITAL | Age: 64
Discharge: HOME | End: 2019-04-01
Attending: PHYSICIAN ASSISTANT
Payer: MEDICARE

## 2019-04-01 ENCOUNTER — OFFICE VISIT (OUTPATIENT)
Dept: ORTHOPEDICS | Facility: CLINIC | Age: 64
End: 2019-04-01
Payer: MEDICARE

## 2019-04-01 ENCOUNTER — TELEPHONE (OUTPATIENT)
Dept: INTERNAL MEDICINE | Facility: CLINIC | Age: 64
End: 2019-04-01

## 2019-04-01 VITALS — BODY MASS INDEX: 34.03 KG/M2 | HEIGHT: 62 IN | WEIGHT: 184.94 LBS

## 2019-04-01 DIAGNOSIS — G89.29 CHRONIC KNEE PAIN, UNSPECIFIED LATERALITY: Primary | ICD-10-CM

## 2019-04-01 DIAGNOSIS — M25.569 CHRONIC KNEE PAIN, UNSPECIFIED LATERALITY: Primary | ICD-10-CM

## 2019-04-01 DIAGNOSIS — M25.562 PAIN IN BOTH KNEES, UNSPECIFIED CHRONICITY: ICD-10-CM

## 2019-04-01 DIAGNOSIS — M25.562 PAIN IN BOTH KNEES, UNSPECIFIED CHRONICITY: Primary | ICD-10-CM

## 2019-04-01 DIAGNOSIS — M17.11 PRIMARY OSTEOARTHRITIS OF RIGHT KNEE: Primary | ICD-10-CM

## 2019-04-01 DIAGNOSIS — M25.561 PAIN IN BOTH KNEES, UNSPECIFIED CHRONICITY: ICD-10-CM

## 2019-04-01 DIAGNOSIS — M25.561 PAIN IN BOTH KNEES, UNSPECIFIED CHRONICITY: Primary | ICD-10-CM

## 2019-04-01 PROCEDURE — 73564 X-RAY EXAM KNEE 4 OR MORE: CPT | Mod: TC,50

## 2019-04-01 PROCEDURE — 99999 PR PBB SHADOW E&M-EST. PATIENT-LVL III: CPT | Mod: PBBFAC,,, | Performed by: PHYSICIAN ASSISTANT

## 2019-04-01 PROCEDURE — 99999 PR PBB SHADOW E&M-EST. PATIENT-LVL III: ICD-10-PCS | Mod: PBBFAC,,, | Performed by: PHYSICIAN ASSISTANT

## 2019-04-01 PROCEDURE — 99213 OFFICE O/P EST LOW 20 MIN: CPT | Mod: PBBFAC,25 | Performed by: PHYSICIAN ASSISTANT

## 2019-04-01 PROCEDURE — 73564 XR KNEE ORTHO BILAT WITH FLEXION: ICD-10-PCS | Mod: 26,50,, | Performed by: RADIOLOGY

## 2019-04-01 PROCEDURE — 99203 PR OFFICE/OUTPT VISIT, NEW, LEVL III, 30-44 MIN: ICD-10-PCS | Mod: S$PBB,,, | Performed by: PHYSICIAN ASSISTANT

## 2019-04-01 PROCEDURE — 99203 OFFICE O/P NEW LOW 30 MIN: CPT | Mod: S$PBB,,, | Performed by: PHYSICIAN ASSISTANT

## 2019-04-01 PROCEDURE — 73564 X-RAY EXAM KNEE 4 OR MORE: CPT | Mod: 26,50,, | Performed by: RADIOLOGY

## 2019-04-01 NOTE — LETTER
April 3, 2019      Jean Carlos Samuels MD  [address]  [address]  [facility] [address]           [facility] - Orthopedics  [address] [facility]  [facility] [address]  Phone: [phone]          Patient: Lucas Mayer   MR Number: [id number]   YOB: 1955   Date of Visit: 4/1/2019       Dear Dr. Jean Carlos Samuels:    Thank you for referring Lucas Mayer to me for evaluation. Attached you will find relevant portions of my assessment and plan of care.    If you have questions, please do not hesitate to call me. I look forward to following Lucas Mayer along with you.    Sincerely,    Camila De Jesus PA-C    Enclosure  CC:  No Recipients    If you would like to receive this communication electronically, please contact [email or web address] or [phone] to request more information on [employer] Link access.    For providers and/or their staff who would like to refer a patient to [facility], please contact us through our one-stop-shop provider referral line, [facility], at [phone].    If you feel you have received this communication in error or would no longer like to receive these types of communications, please e-mail [email or web address]

## 2019-04-03 NOTE — PROGRESS NOTES
Subjective:      Patient ID: Lucas Mayer is a 63 y.o. female.    Chief Complaint: Pain of the Right Knee and Pain of the Left Knee    HPI    Patient is a 63 year old female who presents to clinic with chief complaint of a-traumatic intermittent right knee pain. Pain is increased with walking on the beach for exercise and stairs. She reports that when she does the pool during the summer she does not have this problems. Patient has tried activity modification to relieve her pain which helps. She denied locking catching or feeling unstable. She does reports pain will at time radiate up her leg.     Review of Systems   Constitution: Negative for chills and fever.   Cardiovascular: Negative for chest pain.   Respiratory: Negative for cough and shortness of breath.    Skin: Negative for color change, dry skin, itching, nail changes, poor wound healing and rash.   Musculoskeletal:        Right knee pain   Neurological: Negative for dizziness.   Psychiatric/Behavioral: Negative for altered mental status. The patient is not nervous/anxious.    All other systems reviewed and are negative.        Objective:      General    Constitutional: She is oriented to person, place, and time. She appears well-developed and well-nourished. No distress.   HENT:   Head: Atraumatic.   Eyes: Conjunctivae are normal.   Cardiovascular: Normal rate.    Pulmonary/Chest: Effort normal.   Neurological: She is alert and oriented to person, place, and time.   Psychiatric: She has a normal mood and affect. Her behavior is normal.           Right Knee Exam     Inspection   Swelling: absent  Bruising: absent    Tenderness   The patient is tender to palpation of the patella and medial joint line.    Range of Motion   The patient has normal right knee ROM.    Tests   Meniscus   Juvencio:  Medial - negative Lateral - negative  Ligament Examination Lachman: normal (-1 to 2mm) PCL-Posterior Drawer: normal (0 to 2mm)     MCL - Valgus: normal (0 to  2mm)  LCL - Varus: normal    Other   Sensation: normal    Muscle Strength   Right Lower Extremity   Quadriceps:  5/5   Hamstrin/5             RADS: Medial compartment tibiofemoral joint space narrowing is observed bilaterally, very slightly more pronounced on the left side than the right.  No significant patellofemoral joint space narrowing on either side.  Some minor marginal tibial plateau spurring and patellofemoral spurring is seen bilaterally.  Osseous structures demonstrate no evidence of recent or healing fracture or lytic destructive process.  No demonstrable joint effusion is seen on either side.  Assessment:       Encounter Diagnosis   Name Primary?    Primary osteoarthritis of right knee Yes          Plan:       Discussed treatment options with patient including lifestyle modification, oral medication, injection and surgical consultation. At this time she will do lifestyle modification and Tylenol as needed  pain. She is to return to clinic as needed.

## 2019-04-09 ENCOUNTER — OFFICE VISIT (OUTPATIENT)
Dept: OPTOMETRY | Facility: CLINIC | Age: 64
End: 2019-04-09
Payer: MEDICARE

## 2019-04-09 DIAGNOSIS — H02.053 TRICHIASIS OF RIGHT EYE WITHOUT ENTROPION: ICD-10-CM

## 2019-04-09 DIAGNOSIS — H04.123 DRY EYE SYNDROME OF BOTH EYES: Primary | ICD-10-CM

## 2019-04-09 PROCEDURE — 67820 REVISE EYELASHES: CPT | Mod: PBBFAC | Performed by: OPTOMETRIST

## 2019-04-09 PROCEDURE — A4262 TEMPORARY TEAR DUCT PLUG: HCPCS | Mod: PBBFAC | Performed by: OPTOMETRIST

## 2019-04-09 PROCEDURE — 67820 PR REVISE EYELASHES,FORCEPS: ICD-10-PCS | Mod: 51,S$PBB,RT, | Performed by: OPTOMETRIST

## 2019-04-09 PROCEDURE — 68761 PR CLOSE TEAR DUCT OPENING BY PLUG,EA: ICD-10-PCS | Mod: 51,E4,S$PBB, | Performed by: OPTOMETRIST

## 2019-04-09 PROCEDURE — 92012 INTRM OPH EXAM EST PATIENT: CPT | Mod: 25,S$PBB,, | Performed by: OPTOMETRIST

## 2019-04-09 PROCEDURE — 67820 REVISE EYELASHES: CPT | Mod: 51,S$PBB,RT, | Performed by: OPTOMETRIST

## 2019-04-09 PROCEDURE — 68761 CLOSE TEAR DUCT OPENING: CPT | Mod: E2,S$PBB,, | Performed by: OPTOMETRIST

## 2019-04-09 PROCEDURE — 99999 PR PBB SHADOW E&M-EST. PATIENT-LVL II: CPT | Mod: PBBFAC,,, | Performed by: OPTOMETRIST

## 2019-04-09 PROCEDURE — 92012 PR EYE EXAM, EST PATIENT,INTERMED: ICD-10-PCS | Mod: 25,S$PBB,, | Performed by: OPTOMETRIST

## 2019-04-09 PROCEDURE — 68761 CLOSE TEAR DUCT OPENING: CPT | Mod: PBBFAC,E2

## 2019-04-09 PROCEDURE — 99212 OFFICE O/P EST SF 10 MIN: CPT | Mod: PBBFAC,25 | Performed by: OPTOMETRIST

## 2019-04-09 PROCEDURE — 99999 PR PBB SHADOW E&M-EST. PATIENT-LVL II: ICD-10-PCS | Mod: PBBFAC,,, | Performed by: OPTOMETRIST

## 2019-05-28 ENCOUNTER — PATIENT MESSAGE (OUTPATIENT)
Dept: INTERNAL MEDICINE | Facility: CLINIC | Age: 64
End: 2019-05-28

## 2019-06-10 RX ORDER — CITALOPRAM 20 MG/1
TABLET, FILM COATED ORAL
Qty: 90 TABLET | Refills: 3 | Status: SHIPPED | OUTPATIENT
Start: 2019-06-10 | End: 2019-10-02

## 2019-06-12 DIAGNOSIS — F32.A DEPRESSION, UNSPECIFIED DEPRESSION TYPE: ICD-10-CM

## 2019-06-12 RX ORDER — DIAZEPAM 2 MG/1
TABLET ORAL
Qty: 60 TABLET | Refills: 0 | Status: SHIPPED | OUTPATIENT
Start: 2019-06-12 | End: 2019-07-10 | Stop reason: SDUPTHER

## 2019-06-12 RX ORDER — DIAZEPAM 2 MG/1
TABLET ORAL
Qty: 60 TABLET | Refills: 0 | Status: CANCELLED | OUTPATIENT
Start: 2019-06-12

## 2019-06-21 DIAGNOSIS — Z12.39 BREAST CANCER SCREENING: ICD-10-CM

## 2019-07-10 ENCOUNTER — PATIENT MESSAGE (OUTPATIENT)
Dept: INTERNAL MEDICINE | Facility: CLINIC | Age: 64
End: 2019-07-10

## 2019-07-10 DIAGNOSIS — F32.A DEPRESSION, UNSPECIFIED DEPRESSION TYPE: ICD-10-CM

## 2019-07-11 RX ORDER — DIAZEPAM 2 MG/1
TABLET ORAL
Qty: 60 TABLET | Refills: 2 | Status: SHIPPED | OUTPATIENT
Start: 2019-07-11 | End: 2019-10-14 | Stop reason: SDUPTHER

## 2019-08-02 DIAGNOSIS — I10 BENIGN ESSENTIAL HYPERTENSION: ICD-10-CM

## 2019-08-02 RX ORDER — AMLODIPINE BESYLATE 5 MG/1
TABLET ORAL
Qty: 90 TABLET | Refills: 0 | Status: SHIPPED | OUTPATIENT
Start: 2019-08-02 | End: 2019-10-30 | Stop reason: SDUPTHER

## 2019-08-05 RX ORDER — ATORVASTATIN CALCIUM 40 MG/1
TABLET, FILM COATED ORAL
Qty: 90 TABLET | Refills: 0 | Status: SHIPPED | OUTPATIENT
Start: 2019-08-05 | End: 2019-10-02

## 2019-09-18 ENCOUNTER — PATIENT OUTREACH (OUTPATIENT)
Dept: ADMINISTRATIVE | Facility: HOSPITAL | Age: 64
End: 2019-09-18

## 2019-09-18 DIAGNOSIS — E78.2 MIXED HYPERLIPIDEMIA: Primary | ICD-10-CM

## 2019-09-26 ENCOUNTER — PATIENT MESSAGE (OUTPATIENT)
Dept: INTERNAL MEDICINE | Facility: CLINIC | Age: 64
End: 2019-09-26

## 2019-09-27 ENCOUNTER — LAB VISIT (OUTPATIENT)
Dept: LAB | Facility: HOSPITAL | Age: 64
End: 2019-09-27
Attending: INTERNAL MEDICINE
Payer: MEDICARE

## 2019-09-27 DIAGNOSIS — E78.2 MIXED HYPERLIPIDEMIA: ICD-10-CM

## 2019-09-27 LAB
ALBUMIN SERPL BCP-MCNC: 4 G/DL (ref 3.5–5.2)
ALP SERPL-CCNC: 116 U/L (ref 55–135)
ALT SERPL W/O P-5'-P-CCNC: 28 U/L (ref 10–44)
ANION GAP SERPL CALC-SCNC: 9 MMOL/L (ref 8–16)
AST SERPL-CCNC: 21 U/L (ref 10–40)
BASOPHILS # BLD AUTO: 0.05 K/UL (ref 0–0.2)
BASOPHILS NFR BLD: 0.7 % (ref 0–1.9)
BILIRUB SERPL-MCNC: 0.8 MG/DL (ref 0.1–1)
BUN SERPL-MCNC: 11 MG/DL (ref 8–23)
CALCIUM SERPL-MCNC: 9.6 MG/DL (ref 8.7–10.5)
CHLORIDE SERPL-SCNC: 101 MMOL/L (ref 95–110)
CHOLEST SERPL-MCNC: 164 MG/DL (ref 120–199)
CHOLEST/HDLC SERPL: 3.3 {RATIO} (ref 2–5)
CO2 SERPL-SCNC: 29 MMOL/L (ref 23–29)
CREAT SERPL-MCNC: 0.6 MG/DL (ref 0.5–1.4)
DIFFERENTIAL METHOD: ABNORMAL
EOSINOPHIL # BLD AUTO: 0.4 K/UL (ref 0–0.5)
EOSINOPHIL NFR BLD: 5.8 % (ref 0–8)
ERYTHROCYTE [DISTWIDTH] IN BLOOD BY AUTOMATED COUNT: 13 % (ref 11.5–14.5)
EST. GFR  (AFRICAN AMERICAN): >60 ML/MIN/1.73 M^2
EST. GFR  (NON AFRICAN AMERICAN): >60 ML/MIN/1.73 M^2
GLUCOSE SERPL-MCNC: 106 MG/DL (ref 70–110)
HCT VFR BLD AUTO: 42.2 % (ref 37–48.5)
HDLC SERPL-MCNC: 49 MG/DL (ref 40–75)
HDLC SERPL: 29.9 % (ref 20–50)
HGB BLD-MCNC: 13.7 G/DL (ref 12–16)
IMM GRANULOCYTES # BLD AUTO: 0.05 K/UL (ref 0–0.04)
IMM GRANULOCYTES NFR BLD AUTO: 0.7 % (ref 0–0.5)
LDLC SERPL CALC-MCNC: 95 MG/DL (ref 63–159)
LYMPHOCYTES # BLD AUTO: 1.2 K/UL (ref 1–4.8)
LYMPHOCYTES NFR BLD: 17.8 % (ref 18–48)
MCH RBC QN AUTO: 29.2 PG (ref 27–31)
MCHC RBC AUTO-ENTMCNC: 32.5 G/DL (ref 32–36)
MCV RBC AUTO: 90 FL (ref 82–98)
MONOCYTES # BLD AUTO: 0.4 K/UL (ref 0.3–1)
MONOCYTES NFR BLD: 6.2 % (ref 4–15)
NEUTROPHILS # BLD AUTO: 4.8 K/UL (ref 1.8–7.7)
NEUTROPHILS NFR BLD: 68.8 % (ref 38–73)
NONHDLC SERPL-MCNC: 115 MG/DL
NRBC BLD-RTO: 0 /100 WBC
PLATELET # BLD AUTO: 226 K/UL (ref 150–350)
PMV BLD AUTO: 9.2 FL (ref 9.2–12.9)
POTASSIUM SERPL-SCNC: 3.7 MMOL/L (ref 3.5–5.1)
PROT SERPL-MCNC: 7.7 G/DL (ref 6–8.4)
RBC # BLD AUTO: 4.69 M/UL (ref 4–5.4)
SODIUM SERPL-SCNC: 139 MMOL/L (ref 136–145)
TRIGL SERPL-MCNC: 100 MG/DL (ref 30–150)
TSH SERPL DL<=0.005 MIU/L-ACNC: 2.27 UIU/ML (ref 0.34–5.6)
WBC # BLD AUTO: 6.92 K/UL (ref 3.9–12.7)

## 2019-09-27 PROCEDURE — 80053 COMPREHEN METABOLIC PANEL: CPT

## 2019-09-27 PROCEDURE — 80061 LIPID PANEL: CPT

## 2019-09-27 PROCEDURE — 36415 COLL VENOUS BLD VENIPUNCTURE: CPT

## 2019-09-27 PROCEDURE — 85025 COMPLETE CBC W/AUTO DIFF WBC: CPT

## 2019-09-27 PROCEDURE — 84443 ASSAY THYROID STIM HORMONE: CPT

## 2019-10-02 ENCOUNTER — PATIENT MESSAGE (OUTPATIENT)
Dept: INTERNAL MEDICINE | Facility: CLINIC | Age: 64
End: 2019-10-02

## 2019-10-02 ENCOUNTER — OFFICE VISIT (OUTPATIENT)
Dept: INTERNAL MEDICINE | Facility: CLINIC | Age: 64
End: 2019-10-02
Attending: INTERNAL MEDICINE
Payer: MEDICARE

## 2019-10-02 VITALS
WEIGHT: 197.06 LBS | BODY MASS INDEX: 36.26 KG/M2 | SYSTOLIC BLOOD PRESSURE: 124 MMHG | OXYGEN SATURATION: 98 % | DIASTOLIC BLOOD PRESSURE: 80 MMHG | HEIGHT: 62 IN | HEART RATE: 66 BPM

## 2019-10-02 DIAGNOSIS — H60.90 OTITIS EXTERNA, UNSPECIFIED CHRONICITY, UNSPECIFIED LATERALITY, UNSPECIFIED TYPE: ICD-10-CM

## 2019-10-02 DIAGNOSIS — R06.81 WITNESSED APNEIC SPELLS: Primary | ICD-10-CM

## 2019-10-02 DIAGNOSIS — E78.00 PURE HYPERCHOLESTEROLEMIA: ICD-10-CM

## 2019-10-02 DIAGNOSIS — H49.11: ICD-10-CM

## 2019-10-02 DIAGNOSIS — Z00.00 ANNUAL PHYSICAL EXAM: ICD-10-CM

## 2019-10-02 DIAGNOSIS — I10 BENIGN HYPERTENSION: ICD-10-CM

## 2019-10-02 PROCEDURE — 99999 PR PBB SHADOW E&M-EST. PATIENT-LVL III: CPT | Mod: PBBFAC,,, | Performed by: INTERNAL MEDICINE

## 2019-10-02 PROCEDURE — 99214 OFFICE O/P EST MOD 30 MIN: CPT | Mod: S$PBB,,, | Performed by: INTERNAL MEDICINE

## 2019-10-02 PROCEDURE — 99213 OFFICE O/P EST LOW 20 MIN: CPT | Mod: PBBFAC | Performed by: INTERNAL MEDICINE

## 2019-10-02 PROCEDURE — 99214 PR OFFICE/OUTPT VISIT, EST, LEVL IV, 30-39 MIN: ICD-10-PCS | Mod: S$PBB,,, | Performed by: INTERNAL MEDICINE

## 2019-10-02 PROCEDURE — 99999 PR PBB SHADOW E&M-EST. PATIENT-LVL III: ICD-10-PCS | Mod: PBBFAC,,, | Performed by: INTERNAL MEDICINE

## 2019-10-02 RX ORDER — ATORVASTATIN CALCIUM 40 MG/1
40 TABLET, FILM COATED ORAL DAILY
Qty: 90 TABLET | Refills: 3 | Status: SHIPPED | OUTPATIENT
Start: 2019-10-02 | End: 2020-05-04 | Stop reason: SDUPTHER

## 2019-10-02 RX ORDER — NEOMYCIN SULFATE, POLYMYXIN B SULFATE AND HYDROCORTISONE 10; 3.5; 1 MG/ML; MG/ML; [USP'U]/ML
3 SUSPENSION/ DROPS AURICULAR (OTIC) 4 TIMES DAILY
Qty: 10 ML | Refills: 0 | Status: SHIPPED | OUTPATIENT
Start: 2019-10-02 | End: 2019-10-12

## 2019-10-02 NOTE — PROGRESS NOTES
"Subjective:       Patient ID: Lucas Mayer is a 64 y.o. female.    Chief Complaint: Annual Exam; Otalgia; and Weight Loss    Here for annual exam    Last couple of days has increased sensation of pulsation in her right ear along with hearing loss. Post nasal drip has improved. She swims daily for exercise.       Vertigo  She was placed on low dose valium 2mg BID with an 80% reduction in her symptoms. Symptoms remain well controlled. She will still get dizzy when lying flat or driving over bridges.      Bilateral knee   -frequent cracking and occasional pain. Not debilitating. Overall stable     Suspected MORALES/fatigue  Initial sleep study and then home study denied by insurance. This has since been remedied and has appt for sleep study. Her boyfriend has corroborated witnessed apneic episodes.     HTN  Denies frequent or current HA, intermittent blurry vision, dizziness, CP, or SOB.           Review of Systems   Constitutional: Positive for activity change and fatigue.   Eyes: Negative for discharge.   Respiratory: Negative for wheezing.    Cardiovascular: Negative for chest pain and palpitations.   Gastrointestinal: Negative for constipation, diarrhea and vomiting.   Genitourinary: Negative for difficulty urinating and hematuria.   Neurological: Positive for dizziness. Negative for headaches.   Psychiatric/Behavioral: Negative for dysphoric mood.       Objective:      Vitals:    10/02/19 1039   BP: 124/80   Pulse: 66   SpO2: 98%   Weight: 89.4 kg (197 lb 1.5 oz)   Height: 5' 2" (1.575 m)      Physical Exam   Constitutional: She is oriented to person, place, and time. She appears well-developed and well-nourished. No distress.   HENT:   Head: Normocephalic and atraumatic.   Right Ear: There is swelling (canal nearly completely occluded 2/2 swelling). No mastoid tenderness.   Left Ear: No swelling or tenderness. No foreign bodies. No mastoid tenderness.  No middle ear effusion.   Mouth/Throat: Oropharynx is " clear and moist. No oropharyngeal exudate.   Eyes: Pupils are equal, round, and reactive to light. Conjunctivae and EOM are normal. No scleral icterus.   Neck: No thyromegaly present.   Cardiovascular: Normal rate, regular rhythm and normal heart sounds.   No murmur heard.  Pulmonary/Chest: Effort normal and breath sounds normal. She has no wheezes. She has no rales.   Abdominal: Soft. She exhibits no distension. There is no tenderness.   Musculoskeletal: She exhibits no edema or tenderness.   Lymphadenopathy:     She has no cervical adenopathy.   Neurological: She is alert and oriented to person, place, and time.   Skin: Skin is warm and dry.   Psychiatric: She has a normal mood and affect. Her behavior is normal.       Assessment:       1. Witnessed apneic spells    2. Annual physical exam    3. Benign hypertension    4. Pure hypercholesterolemia    5. CN IV nerve palsy, right eye    6. Otitis externa, unspecified chronicity, unspecified laterality, unspecified type        Plan:       Lucas was seen today for annual exam, otalgia and weight loss.    Diagnoses and all orders for this visit:    Witnessed apneic spells  -     Ambulatory referral to Sleep Disorders    Annual physical exam  Recent labs reviewed. Pt given paper Rx to take to our pharmacy down stairs for following vaccinations:   Influenza, shingrix, and PVN13    Benign hypertension   controlled. Continue current regimen    Pure hypercholesterolemia   Tolerating statin. Continue this.    -     atorvastatin (LIPITOR) 40 MG tablet; Take 1 tablet (40 mg total) by mouth once daily.    CN IV nerve palsy, right eye    Otitis externa, unspecified chronicity, unspecified laterality, unspecified typ  -     neomycin-polymyxin-hydrocortisone (CORTISPORIN) 3.5-10,000-1 mg/mL-unit/mL-% otic suspension; Place 3 drops into the left ear 4 (four) times daily. for 10 days         RTC in 6 months or sooner prn     Jean Carlos Strickland MD  Internal Medicine-[facility]  [facility]        Side effects of medication(s) were discussed in detail and patient voiced understanding.  Patient will call back for any issues or complications.

## 2019-10-09 ENCOUNTER — PATIENT MESSAGE (OUTPATIENT)
Dept: ADMINISTRATIVE | Facility: HOSPITAL | Age: 64
End: 2019-10-09

## 2019-10-09 ENCOUNTER — PATIENT OUTREACH (OUTPATIENT)
Dept: ADMINISTRATIVE | Facility: HOSPITAL | Age: 64
End: 2019-10-09

## 2019-10-14 DIAGNOSIS — F32.A DEPRESSION, UNSPECIFIED DEPRESSION TYPE: ICD-10-CM

## 2019-10-15 RX ORDER — DIAZEPAM 2 MG/1
TABLET ORAL
Qty: 60 TABLET | Refills: 0 | Status: SHIPPED | OUTPATIENT
Start: 2019-10-15 | End: 2019-11-14 | Stop reason: SDUPTHER

## 2019-10-17 ENCOUNTER — IMMUNIZATION (OUTPATIENT)
Dept: FAMILY MEDICINE | Facility: CLINIC | Age: 64
End: 2019-10-17
Payer: MEDICARE

## 2019-10-17 ENCOUNTER — PATIENT MESSAGE (OUTPATIENT)
Dept: INTERNAL MEDICINE | Facility: CLINIC | Age: 64
End: 2019-10-17

## 2019-10-17 PROCEDURE — 90662 FLU VACCINE - HIGH DOSE (65+) PRESERVATIVE FREE IM: ICD-10-PCS | Mod: S$GLB,,, | Performed by: FAMILY MEDICINE

## 2019-10-17 PROCEDURE — 90662 IIV NO PRSV INCREASED AG IM: CPT | Mod: S$GLB,,, | Performed by: FAMILY MEDICINE

## 2019-10-17 PROCEDURE — G0008 ADMIN INFLUENZA VIRUS VAC: HCPCS | Mod: S$GLB,,, | Performed by: FAMILY MEDICINE

## 2019-10-17 PROCEDURE — G0008 FLU VACCINE - HIGH DOSE (65+) PRESERVATIVE FREE IM: ICD-10-PCS | Mod: S$GLB,,, | Performed by: FAMILY MEDICINE

## 2019-10-18 ENCOUNTER — TELEPHONE (OUTPATIENT)
Dept: INTERNAL MEDICINE | Facility: CLINIC | Age: 64
End: 2019-10-18

## 2019-10-18 ENCOUNTER — PATIENT MESSAGE (OUTPATIENT)
Dept: INTERNAL MEDICINE | Facility: CLINIC | Age: 64
End: 2019-10-18

## 2019-10-30 DIAGNOSIS — I10 BENIGN ESSENTIAL HYPERTENSION: ICD-10-CM

## 2019-10-30 RX ORDER — AMLODIPINE BESYLATE 5 MG/1
TABLET ORAL
Qty: 90 TABLET | Refills: 3 | Status: SHIPPED | OUTPATIENT
Start: 2019-10-30 | End: 2020-05-04 | Stop reason: SDUPTHER

## 2019-11-01 ENCOUNTER — PATIENT MESSAGE (OUTPATIENT)
Dept: INTERNAL MEDICINE | Facility: CLINIC | Age: 64
End: 2019-11-01

## 2019-11-01 DIAGNOSIS — R06.83 SNORING: Primary | ICD-10-CM

## 2019-11-04 ENCOUNTER — PATIENT OUTREACH (OUTPATIENT)
Dept: ADMINISTRATIVE | Facility: OTHER | Age: 64
End: 2019-11-04

## 2019-11-14 DIAGNOSIS — F32.A DEPRESSION, UNSPECIFIED DEPRESSION TYPE: ICD-10-CM

## 2019-11-14 RX ORDER — DIAZEPAM 2 MG/1
2 TABLET ORAL EVERY 12 HOURS PRN
Qty: 60 TABLET | Refills: 1 | Status: SHIPPED | OUTPATIENT
Start: 2019-11-14 | End: 2020-01-14

## 2019-11-22 ENCOUNTER — PATIENT MESSAGE (OUTPATIENT)
Dept: INTERNAL MEDICINE | Facility: CLINIC | Age: 64
End: 2019-11-22

## 2019-11-22 DIAGNOSIS — R06.83 SNORING: Primary | ICD-10-CM

## 2019-11-27 ENCOUNTER — TELEPHONE (OUTPATIENT)
Dept: INTERNAL MEDICINE | Facility: CLINIC | Age: 64
End: 2019-11-27

## 2019-11-27 NOTE — TELEPHONE ENCOUNTER
----- Message from Shauna Whitney sent at 11/27/2019  8:43 AM CST -----  Contact: hallie- [facility]   Name of Who is Calling: hallie- [facility]      What is the request in detail: Hallie states that she need clinica notes to go with the referral for the sleep study sent over. Please contact to further discuss and advise.      Can the clinic reply by [facility]:       What Number to Call Back if not in [facility]: [phone]; fax [phone]

## 2019-12-02 ENCOUNTER — PATIENT MESSAGE (OUTPATIENT)
Dept: INTERNAL MEDICINE | Facility: CLINIC | Age: 64
End: 2019-12-02

## 2019-12-03 ENCOUNTER — PATIENT MESSAGE (OUTPATIENT)
Dept: INTERNAL MEDICINE | Facility: CLINIC | Age: 64
End: 2019-12-03

## 2019-12-16 ENCOUNTER — DOCUMENTATION ONLY (OUTPATIENT)
Dept: INTERNAL MEDICINE | Facility: CLINIC | Age: 64
End: 2019-12-16

## 2020-01-13 DIAGNOSIS — F32.A DEPRESSION, UNSPECIFIED DEPRESSION TYPE: ICD-10-CM

## 2020-01-14 RX ORDER — DIAZEPAM 2 MG/1
TABLET ORAL
Qty: 60 TABLET | Refills: 0 | Status: SHIPPED | OUTPATIENT
Start: 2020-01-14 | End: 2020-02-11 | Stop reason: SDUPTHER

## 2020-01-21 ENCOUNTER — TELEPHONE (OUTPATIENT)
Dept: PRIMARY CARE CLINIC | Facility: CLINIC | Age: 65
End: 2020-01-21

## 2020-01-21 NOTE — TELEPHONE ENCOUNTER
----- Message from Ros Molina sent at 1/21/2020  1:07 PM CST -----  Contact: Cleo GONZALEZ)  Name of Who is Calling: Cleo GONZALEZ)      What is the request in detail: Cleo GONZALEZ)is calling to speak to staff in regards to the status of the pt cipap orders being sent and clinical notes  .... Please call to further assist .       Can the clinic reply by [facility]: N       What Number to Call Back if not in [facility]: [phone]

## 2020-01-22 ENCOUNTER — PATIENT MESSAGE (OUTPATIENT)
Dept: INTERNAL MEDICINE | Facility: CLINIC | Age: 65
End: 2020-01-22

## 2020-01-23 ENCOUNTER — PATIENT MESSAGE (OUTPATIENT)
Dept: INTERNAL MEDICINE | Facility: CLINIC | Age: 65
End: 2020-01-23

## 2020-02-11 DIAGNOSIS — F32.A DEPRESSION, UNSPECIFIED DEPRESSION TYPE: ICD-10-CM

## 2020-02-12 NOTE — TELEPHONE ENCOUNTER
----- Message from Linda Chadwick sent at 2/12/2020  7:54 AM CST -----  Contact: Ag @ [facility]  Name of Who is Calling: Ag @ [facility]      What is the request in detail:Ag @ [facility] is requesting a call back regarding a referral for a sleep study for the patient. Please contact to further advise.      Can the clinic reply by [facility]: NO      What Number to Call Back if not in [facility]: [phone]

## 2020-02-13 DIAGNOSIS — F32.A DEPRESSION, UNSPECIFIED DEPRESSION TYPE: ICD-10-CM

## 2020-02-13 RX ORDER — DIAZEPAM 2 MG/1
2 TABLET ORAL EVERY 6 HOURS PRN
Qty: 60 TABLET | Refills: 1 | Status: SHIPPED | OUTPATIENT
Start: 2020-02-13 | End: 2020-02-14

## 2020-02-14 RX ORDER — DIAZEPAM 2 MG/1
2 TABLET ORAL EVERY 6 HOURS PRN
Qty: 60 TABLET | Refills: 0 | Status: SHIPPED | OUTPATIENT
Start: 2020-02-14 | End: 2020-04-13 | Stop reason: SDUPTHER

## 2020-02-14 NOTE — TELEPHONE ENCOUNTER
Last notes by Dr. Samuels reviewed as well as [place]  which is consistent. Will authorize refill in his absence.

## 2020-02-17 ENCOUNTER — TELEPHONE (OUTPATIENT)
Dept: INTERNAL MEDICINE | Facility: CLINIC | Age: 65
End: 2020-02-17

## 2020-02-17 NOTE — TELEPHONE ENCOUNTER
Received fax requesting pending refill  Diazepam 2 mg   It was recently rx on 02/14/2020    Confirmed with pharmacist that pt recently picked up rx on 02/15, no need for refill

## 2020-03-11 ENCOUNTER — PATIENT MESSAGE (OUTPATIENT)
Dept: INTERNAL MEDICINE | Facility: CLINIC | Age: 65
End: 2020-03-11

## 2020-03-12 ENCOUNTER — PATIENT MESSAGE (OUTPATIENT)
Dept: INTERNAL MEDICINE | Facility: CLINIC | Age: 65
End: 2020-03-12

## 2020-03-13 ENCOUNTER — PATIENT MESSAGE (OUTPATIENT)
Dept: INTERNAL MEDICINE | Facility: CLINIC | Age: 65
End: 2020-03-13

## 2020-03-13 NOTE — TELEPHONE ENCOUNTER
On Feb 20th I flew [employer] from [place] to [place] then to [facility]. Same trip on the way home on the 27th.  We stayed at [employer], an all-inclusive resort. We went into [employer] shopping and to their [place] on the 25th but were on the resort grounds the rest of the time.  I was just exhausted the first week back but on Sunday March 8th I started feeling poorly. My girl friend says she did not get sick.   I started with a bad headache the chills, fever, and sweating. No energy at all.  I managed to only eat snack foods until yesterday since I didn't have the energy to fix anything more.  I doubt I've been in contact with anyone infected with Corona. My upper chest is tight and I have an occasional mild dry cough.  The fever seems to be over however I do still get headaches.  I'm only taking ibuprofen and acetaminophen for them.   I think the worst is over!

## 2020-04-13 DIAGNOSIS — F32.A DEPRESSION, UNSPECIFIED DEPRESSION TYPE: ICD-10-CM

## 2020-04-14 RX ORDER — DIAZEPAM 2 MG/1
2 TABLET ORAL EVERY 6 HOURS PRN
Qty: 60 TABLET | Refills: 0 | Status: SHIPPED | OUTPATIENT
Start: 2020-04-14 | End: 2020-05-14 | Stop reason: SDUPTHER

## 2020-05-04 DIAGNOSIS — F32.A DEPRESSION, UNSPECIFIED DEPRESSION TYPE: ICD-10-CM

## 2020-05-04 DIAGNOSIS — E78.5 HYPERLIPIDEMIA, UNSPECIFIED HYPERLIPIDEMIA TYPE: Primary | ICD-10-CM

## 2020-05-04 DIAGNOSIS — I10 BENIGN ESSENTIAL HYPERTENSION: ICD-10-CM

## 2020-05-05 RX ORDER — ATORVASTATIN CALCIUM 40 MG/1
40 TABLET, FILM COATED ORAL DAILY
Qty: 90 TABLET | Refills: 3 | Status: SHIPPED | OUTPATIENT
Start: 2020-05-05 | End: 2021-03-11

## 2020-05-05 RX ORDER — AMLODIPINE BESYLATE 5 MG/1
5 TABLET ORAL DAILY
Qty: 90 TABLET | Refills: 3 | Status: SHIPPED | OUTPATIENT
Start: 2020-05-05 | End: 2021-03-11

## 2020-05-05 RX ORDER — CITALOPRAM 20 MG/1
20 TABLET, FILM COATED ORAL DAILY
Qty: 90 TABLET | Refills: 3 | Status: SHIPPED | OUTPATIENT
Start: 2020-05-05 | End: 2020-05-27

## 2020-05-27 DIAGNOSIS — F32.A DEPRESSION, UNSPECIFIED DEPRESSION TYPE: ICD-10-CM

## 2020-05-27 RX ORDER — CITALOPRAM 20 MG/1
TABLET, FILM COATED ORAL
Qty: 90 TABLET | Refills: 3 | Status: SHIPPED | OUTPATIENT
Start: 2020-05-27 | End: 2021-03-11

## 2020-06-15 DIAGNOSIS — F32.A DEPRESSION, UNSPECIFIED DEPRESSION TYPE: ICD-10-CM

## 2020-06-15 RX ORDER — DIAZEPAM 2 MG/1
2 TABLET ORAL EVERY 6 HOURS PRN
Qty: 60 TABLET | Refills: 2 | Status: SHIPPED | OUTPATIENT
Start: 2020-06-15 | End: 2020-08-09 | Stop reason: SDUPTHER

## 2020-07-15 DIAGNOSIS — F32.A DEPRESSION, UNSPECIFIED DEPRESSION TYPE: ICD-10-CM

## 2020-07-15 RX ORDER — DIAZEPAM 2 MG/1
2 TABLET ORAL EVERY 6 HOURS PRN
Qty: 60 TABLET | Refills: 2 | Status: CANCELLED | OUTPATIENT
Start: 2020-07-15

## 2020-07-20 ENCOUNTER — PATIENT OUTREACH (OUTPATIENT)
Dept: ADMINISTRATIVE | Facility: HOSPITAL | Age: 65
End: 2020-07-20

## 2020-08-09 DIAGNOSIS — F32.A DEPRESSION, UNSPECIFIED DEPRESSION TYPE: ICD-10-CM

## 2020-08-10 RX ORDER — DIAZEPAM 2 MG/1
2 TABLET ORAL EVERY 6 HOURS PRN
Qty: 60 TABLET | Refills: 2 | Status: SHIPPED | OUTPATIENT
Start: 2020-08-10 | End: 2020-10-18 | Stop reason: SDUPTHER

## 2020-08-14 NOTE — PROGRESS NOTES
HPI     Dry Eye      Additional comments: 1 month punctal plug insertion              Comments     Last eye exam was 3/1/19 with Dr. Zhang.  Patient states no vision changes since last exam. Had punctal plugs   inserted about 3 years ago and only had them in for about 3 months.  Patient denies diplopia, headaches, flashes/floaters, and pain.              Last edited by Sydney Barrera on 4/9/2019 10:52 AM. (History)            Assessment /Plan     For exam results, see Encounter Report.    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  Patient presents in clinic today for punctal plug insertion.  Has done in the past and finds its helpful with dry eye.   Procedure:  One proparacaine drop instilled in each eye.  Dilated lower puncta with dilator and inserted a Dissolvable VisiPlug size 0.4 with jewelers without complications into each eye.  Left puncta could use smaller size. Plugs should last 6 months.     *Patient returned 20 minutes later reporting irritation in the left eye.  Plug had started to come out.  Opened new dilator and pushed plug back into puncta.     2.  Trichiasis secondary to skin cancer removal.  Using jewelers remove eyelash from right upper lid without complication.       RTC as scheduled or sooner if any problems.                    PROGRESS NOTE  S:26 yrs Patient  admitted on 8/12/2020 with Anemia [D64.9] . Today he complains of Nausea, Abdominal Pain and Cramping. Exam:   Vitals:    08/14/20 1059   BP: (!) 165/89   Pulse: 91   Resp: 16   Temp: 97.5 °F (36.4 °C)   SpO2: 97%      General appearance: alert, appears stated age, cooperative, mild distress and morbidly obese  HEENT: Oropharynx clear, no lesions  Neck: no adenopathy and supple, symmetrical, trachea midline  Lungs: clear to auscultation bilaterally  Heart: regular rate and rhythm, S1, S2 normal, no murmur, click, rub or gallop  Abdomen: normal findings: no masses palpable and symmetric and abnormal findings:  hypoactive bowel sounds, obese and tenderness marked in the entire abdomen  Extremities: extremities normal, atraumatic, no cyanosis or edema     Medications: Reviewed    Labs:  CBC:   Recent Labs     08/12/20 0259 08/12/20  0615  08/13/20  0535  08/13/20  1349 08/13/20  2139 08/14/20  0557   WBC 1.6* 1.7*  --  1.4*  --   --   --   --    HGB 8.5* 7.9*   < > 8.2*  --  8.8* 9.2* 9.5*   HCT 25.8* 24.0*   < > 24.8*   < > 27.2* 27.7* 29.3*   MCV 89.3 87.7  --  89.1  --   --   --   --     194  --  180  --   --   --   --     < > = values in this interval not displayed. BMP:   Recent Labs     08/12/20 0259 08/13/20 0535   * 135*   K 4.1 4.7   CL 99 100   CO2 23 28   BUN 18 14   CREATININE 0.7* 0.7*     LIVER PROFILE:   Recent Labs     08/12/20 0259   AST 48*   ALT 43*   LIPASE 16.0   PROT 7.5   BILITOT 0.9   ALKPHOS 66     PT/INR:   Recent Labs     08/12/20 0259   INR 1.08       Procedure(s): SPLENECTOMY, LIVER BIOPSY   Pre-Op Diagnosis: HYPERSPLENISM  Post-Op Diagnosis: Same       Attending Supervising [de-identified] Attestation Statement  The patient is a 27 y.o. male. I have performed a history and physical examination of the patient.  I discussed the case with my physician assistant Rayne Martinez PA-C    I reviewed the

## 2020-09-29 ENCOUNTER — PATIENT MESSAGE (OUTPATIENT)
Dept: OTHER | Facility: OTHER | Age: 65
End: 2020-09-29

## 2020-12-11 ENCOUNTER — PATIENT MESSAGE (OUTPATIENT)
Dept: OTHER | Facility: OTHER | Age: 65
End: 2020-12-11

## 2021-01-05 ENCOUNTER — PATIENT MESSAGE (OUTPATIENT)
Dept: ADMINISTRATIVE | Facility: HOSPITAL | Age: 66
End: 2021-01-05

## 2021-01-05 ENCOUNTER — PATIENT MESSAGE (OUTPATIENT)
Dept: INTERNAL MEDICINE | Facility: CLINIC | Age: 66
End: 2021-01-05

## 2021-01-05 DIAGNOSIS — I10 BENIGN HYPERTENSION: ICD-10-CM

## 2021-01-05 DIAGNOSIS — Z20.822 EXPOSURE TO COVID-19 VIRUS: ICD-10-CM

## 2021-01-05 DIAGNOSIS — Z00.00 ANNUAL PHYSICAL EXAM: Primary | ICD-10-CM

## 2021-01-05 DIAGNOSIS — F32.A DEPRESSION, UNSPECIFIED DEPRESSION TYPE: ICD-10-CM

## 2021-01-05 DIAGNOSIS — Z01.84 ENCOUNTER FOR ANTIBODY RESPONSE EXAMINATION: ICD-10-CM

## 2021-01-05 DIAGNOSIS — E78.00 PURE HYPERCHOLESTEROLEMIA: ICD-10-CM

## 2021-01-05 DIAGNOSIS — R79.9 ABNORMAL FINDING OF BLOOD CHEMISTRY, UNSPECIFIED: ICD-10-CM

## 2021-01-05 RX ORDER — DIAZEPAM 2 MG/1
2 TABLET ORAL EVERY 6 HOURS PRN
Qty: 60 TABLET | Refills: 2 | Status: SHIPPED | OUTPATIENT
Start: 2021-01-05 | End: 2021-03-25 | Stop reason: SDUPTHER

## 2021-01-13 ENCOUNTER — PATIENT MESSAGE (OUTPATIENT)
Dept: INTERNAL MEDICINE | Facility: CLINIC | Age: 66
End: 2021-01-13

## 2021-01-13 DIAGNOSIS — Z00.00 ANNUAL PHYSICAL EXAM: Primary | ICD-10-CM

## 2021-01-15 ENCOUNTER — LAB VISIT (OUTPATIENT)
Dept: LAB | Facility: HOSPITAL | Age: 66
End: 2021-01-15
Attending: INTERNAL MEDICINE
Payer: MEDICARE

## 2021-01-15 DIAGNOSIS — E78.00 PURE HYPERCHOLESTEROLEMIA: ICD-10-CM

## 2021-01-15 DIAGNOSIS — Z20.822 EXPOSURE TO COVID-19 VIRUS: ICD-10-CM

## 2021-01-15 DIAGNOSIS — I10 BENIGN HYPERTENSION: ICD-10-CM

## 2021-01-15 DIAGNOSIS — R79.9 ABNORMAL FINDING OF BLOOD CHEMISTRY, UNSPECIFIED: ICD-10-CM

## 2021-01-15 DIAGNOSIS — Z01.84 ENCOUNTER FOR ANTIBODY RESPONSE EXAMINATION: ICD-10-CM

## 2021-01-15 LAB
ALBUMIN SERPL BCP-MCNC: 4.3 G/DL (ref 3.5–5.2)
ALP SERPL-CCNC: 117 U/L (ref 55–135)
ALT SERPL W/O P-5'-P-CCNC: 31 U/L (ref 10–44)
ANION GAP SERPL CALC-SCNC: 10 MMOL/L (ref 8–16)
AST SERPL-CCNC: 22 U/L (ref 10–40)
BASOPHILS # BLD AUTO: 0.06 K/UL (ref 0–0.2)
BASOPHILS NFR BLD: 0.8 % (ref 0–1.9)
BILIRUB SERPL-MCNC: 1 MG/DL (ref 0.1–1)
BUN SERPL-MCNC: 11 MG/DL (ref 8–23)
CALCIUM SERPL-MCNC: 9.5 MG/DL (ref 8.7–10.5)
CHLORIDE SERPL-SCNC: 102 MMOL/L (ref 95–110)
CHOLEST SERPL-MCNC: 176 MG/DL (ref 120–199)
CHOLEST/HDLC SERPL: 3.8 {RATIO} (ref 2–5)
CO2 SERPL-SCNC: 27 MMOL/L (ref 23–29)
CREAT SERPL-MCNC: 0.6 MG/DL (ref 0.5–1.4)
DIFFERENTIAL METHOD: ABNORMAL
EOSINOPHIL # BLD AUTO: 0.3 K/UL (ref 0–0.5)
EOSINOPHIL NFR BLD: 3.8 % (ref 0–8)
ERYTHROCYTE [DISTWIDTH] IN BLOOD BY AUTOMATED COUNT: 12.9 % (ref 11.5–14.5)
EST. GFR  (AFRICAN AMERICAN): >60 ML/MIN/1.73 M^2
EST. GFR  (NON AFRICAN AMERICAN): >60 ML/MIN/1.73 M^2
ESTIMATED AVG GLUCOSE: 117 MG/DL (ref 68–131)
GLUCOSE SERPL-MCNC: 101 MG/DL (ref 70–110)
HBA1C MFR BLD HPLC: 5.7 % (ref 4.5–6.2)
HCT VFR BLD AUTO: 42.7 % (ref 37–48.5)
HDLC SERPL-MCNC: 46 MG/DL (ref 40–75)
HDLC SERPL: 26.1 % (ref 20–50)
HGB BLD-MCNC: 13.9 G/DL (ref 12–16)
IMM GRANULOCYTES # BLD AUTO: 0.01 K/UL (ref 0–0.04)
IMM GRANULOCYTES NFR BLD AUTO: 0.1 % (ref 0–0.5)
LDLC SERPL CALC-MCNC: 108 MG/DL (ref 63–159)
LYMPHOCYTES # BLD AUTO: 1.8 K/UL (ref 1–4.8)
LYMPHOCYTES NFR BLD: 25.2 % (ref 18–48)
MCH RBC QN AUTO: 28.7 PG (ref 27–31)
MCHC RBC AUTO-ENTMCNC: 32.6 G/DL (ref 32–36)
MCV RBC AUTO: 88 FL (ref 82–98)
MONOCYTES # BLD AUTO: 0.4 K/UL (ref 0.3–1)
MONOCYTES NFR BLD: 5 % (ref 4–15)
NEUTROPHILS # BLD AUTO: 4.6 K/UL (ref 1.8–7.7)
NEUTROPHILS NFR BLD: 65.1 % (ref 38–73)
NONHDLC SERPL-MCNC: 130 MG/DL
NRBC BLD-RTO: 0 /100 WBC
PLATELET # BLD AUTO: 222 K/UL (ref 150–350)
PMV BLD AUTO: 8.8 FL (ref 9.2–12.9)
POTASSIUM SERPL-SCNC: 4.1 MMOL/L (ref 3.5–5.1)
PROT SERPL-MCNC: 8.2 G/DL (ref 6–8.4)
RBC # BLD AUTO: 4.85 M/UL (ref 4–5.4)
SODIUM SERPL-SCNC: 139 MMOL/L (ref 136–145)
TRIGL SERPL-MCNC: 110 MG/DL (ref 30–150)
TSH SERPL DL<=0.005 MIU/L-ACNC: 3.49 UIU/ML (ref 0.34–5.6)
WBC # BLD AUTO: 7.13 K/UL (ref 3.9–12.7)

## 2021-01-15 PROCEDURE — 80053 COMPREHEN METABOLIC PANEL: CPT

## 2021-01-15 PROCEDURE — 84443 ASSAY THYROID STIM HORMONE: CPT

## 2021-01-15 PROCEDURE — 86769 SARS-COV-2 COVID-19 ANTIBODY: CPT

## 2021-01-15 PROCEDURE — 85025 COMPLETE CBC W/AUTO DIFF WBC: CPT

## 2021-01-15 PROCEDURE — 36415 COLL VENOUS BLD VENIPUNCTURE: CPT

## 2021-01-15 PROCEDURE — 80061 LIPID PANEL: CPT

## 2021-01-15 PROCEDURE — 83036 HEMOGLOBIN GLYCOSYLATED A1C: CPT

## 2021-01-16 LAB — SARS-COV-2 IGG SERPLBLD QL IA.RAPID: NEGATIVE

## 2021-01-22 ENCOUNTER — PATIENT MESSAGE (OUTPATIENT)
Dept: INTERNAL MEDICINE | Facility: CLINIC | Age: 66
End: 2021-01-22

## 2021-02-05 ENCOUNTER — PATIENT MESSAGE (OUTPATIENT)
Dept: INTERNAL MEDICINE | Facility: CLINIC | Age: 66
End: 2021-02-05

## 2021-02-19 ENCOUNTER — PATIENT MESSAGE (OUTPATIENT)
Dept: INTERNAL MEDICINE | Facility: CLINIC | Age: 66
End: 2021-02-19

## 2021-02-22 ENCOUNTER — OFFICE VISIT (OUTPATIENT)
Dept: INTERNAL MEDICINE | Facility: CLINIC | Age: 66
End: 2021-02-22
Attending: INTERNAL MEDICINE
Payer: MEDICARE

## 2021-02-22 VITALS
WEIGHT: 211.63 LBS | HEIGHT: 62 IN | OXYGEN SATURATION: 98 % | DIASTOLIC BLOOD PRESSURE: 60 MMHG | HEART RATE: 66 BPM | SYSTOLIC BLOOD PRESSURE: 124 MMHG | BODY MASS INDEX: 38.94 KG/M2

## 2021-02-22 DIAGNOSIS — G47.33 OSA (OBSTRUCTIVE SLEEP APNEA): ICD-10-CM

## 2021-02-22 DIAGNOSIS — I10 BENIGN HYPERTENSION: ICD-10-CM

## 2021-02-22 DIAGNOSIS — E78.00 PURE HYPERCHOLESTEROLEMIA: ICD-10-CM

## 2021-02-22 DIAGNOSIS — M17.0 PRIMARY OSTEOARTHRITIS OF BOTH KNEES: Primary | ICD-10-CM

## 2021-02-22 DIAGNOSIS — M25.562 CHRONIC PAIN OF BOTH KNEES: ICD-10-CM

## 2021-02-22 DIAGNOSIS — G89.29 CHRONIC PAIN OF BOTH KNEES: ICD-10-CM

## 2021-02-22 DIAGNOSIS — Z12.11 COLON CANCER SCREENING: ICD-10-CM

## 2021-02-22 DIAGNOSIS — Z12.31 ENCOUNTER FOR SCREENING MAMMOGRAM FOR MALIGNANT NEOPLASM OF BREAST: ICD-10-CM

## 2021-02-22 DIAGNOSIS — M25.561 CHRONIC PAIN OF BOTH KNEES: ICD-10-CM

## 2021-02-22 PROCEDURE — 99999 PR PBB SHADOW E&M-EST. PATIENT-LVL IV: ICD-10-PCS | Mod: PBBFAC,,, | Performed by: INTERNAL MEDICINE

## 2021-02-22 PROCEDURE — 99999 PR PBB SHADOW E&M-EST. PATIENT-LVL IV: CPT | Mod: PBBFAC,,, | Performed by: INTERNAL MEDICINE

## 2021-02-22 PROCEDURE — 99214 OFFICE O/P EST MOD 30 MIN: CPT | Mod: PBBFAC | Performed by: INTERNAL MEDICINE

## 2021-02-22 PROCEDURE — 99214 PR OFFICE/OUTPT VISIT, EST, LEVL IV, 30-39 MIN: ICD-10-PCS | Mod: S$PBB,,, | Performed by: INTERNAL MEDICINE

## 2021-02-22 PROCEDURE — 99214 OFFICE O/P EST MOD 30 MIN: CPT | Mod: S$PBB,,, | Performed by: INTERNAL MEDICINE

## 2021-02-23 ENCOUNTER — CLINICAL SUPPORT (OUTPATIENT)
Dept: REHABILITATION | Facility: HOSPITAL | Age: 66
End: 2021-02-23
Payer: MEDICARE

## 2021-02-23 DIAGNOSIS — M17.0 PRIMARY OSTEOARTHRITIS OF BOTH KNEES: ICD-10-CM

## 2021-02-23 PROCEDURE — 97162 PT EVAL MOD COMPLEX 30 MIN: CPT | Mod: PN

## 2021-02-23 PROCEDURE — 97110 THERAPEUTIC EXERCISES: CPT | Mod: PN

## 2021-02-25 ENCOUNTER — CLINICAL SUPPORT (OUTPATIENT)
Dept: REHABILITATION | Facility: HOSPITAL | Age: 66
End: 2021-02-25
Payer: MEDICARE

## 2021-02-25 DIAGNOSIS — M62.81 MUSCLE WEAKNESS: ICD-10-CM

## 2021-02-25 DIAGNOSIS — M25.562 BILATERAL CHRONIC KNEE PAIN: ICD-10-CM

## 2021-02-25 DIAGNOSIS — M25.561 BILATERAL CHRONIC KNEE PAIN: ICD-10-CM

## 2021-02-25 DIAGNOSIS — G89.29 BILATERAL CHRONIC KNEE PAIN: ICD-10-CM

## 2021-02-25 PROCEDURE — 97110 THERAPEUTIC EXERCISES: CPT | Mod: PN

## 2021-03-01 ENCOUNTER — CLINICAL SUPPORT (OUTPATIENT)
Dept: REHABILITATION | Facility: HOSPITAL | Age: 66
End: 2021-03-01
Payer: MEDICARE

## 2021-03-01 DIAGNOSIS — M25.561 BILATERAL CHRONIC KNEE PAIN: ICD-10-CM

## 2021-03-01 DIAGNOSIS — M62.81 MUSCLE WEAKNESS: ICD-10-CM

## 2021-03-01 DIAGNOSIS — G89.29 BILATERAL CHRONIC KNEE PAIN: ICD-10-CM

## 2021-03-01 DIAGNOSIS — M25.562 BILATERAL CHRONIC KNEE PAIN: ICD-10-CM

## 2021-03-01 PROCEDURE — 97110 THERAPEUTIC EXERCISES: CPT | Mod: PN

## 2021-03-03 ENCOUNTER — CLINICAL SUPPORT (OUTPATIENT)
Dept: REHABILITATION | Facility: HOSPITAL | Age: 66
End: 2021-03-03
Payer: MEDICARE

## 2021-03-03 DIAGNOSIS — M25.561 BILATERAL CHRONIC KNEE PAIN: ICD-10-CM

## 2021-03-03 DIAGNOSIS — G89.29 BILATERAL CHRONIC KNEE PAIN: ICD-10-CM

## 2021-03-03 DIAGNOSIS — M25.562 BILATERAL CHRONIC KNEE PAIN: ICD-10-CM

## 2021-03-03 DIAGNOSIS — M62.81 MUSCLE WEAKNESS: ICD-10-CM

## 2021-03-03 PROCEDURE — 97110 THERAPEUTIC EXERCISES: CPT | Mod: PN

## 2021-03-04 ENCOUNTER — HOSPITAL ENCOUNTER (OUTPATIENT)
Dept: RADIOLOGY | Facility: CLINIC | Age: 66
Discharge: HOME | End: 2021-03-04
Attending: INTERNAL MEDICINE
Payer: MEDICARE

## 2021-03-04 VITALS — WEIGHT: 211 LBS | BODY MASS INDEX: 38.83 KG/M2 | HEIGHT: 62 IN

## 2021-03-04 DIAGNOSIS — Z12.31 ENCOUNTER FOR SCREENING MAMMOGRAM FOR MALIGNANT NEOPLASM OF BREAST: ICD-10-CM

## 2021-03-04 PROCEDURE — 77067 MAMMO DIGITAL SCREENING BILAT WITH TOMO: ICD-10-PCS | Mod: 26,,, | Performed by: RADIOLOGY

## 2021-03-04 PROCEDURE — 77067 SCR MAMMO BI INCL CAD: CPT | Mod: 26,,, | Performed by: RADIOLOGY

## 2021-03-04 PROCEDURE — 77063 BREAST TOMOSYNTHESIS BI: CPT | Mod: 26,,, | Performed by: RADIOLOGY

## 2021-03-04 PROCEDURE — 77063 MAMMO DIGITAL SCREENING BILAT WITH TOMO: ICD-10-PCS | Mod: 26,,, | Performed by: RADIOLOGY

## 2021-03-08 ENCOUNTER — CLINICAL SUPPORT (OUTPATIENT)
Dept: REHABILITATION | Facility: HOSPITAL | Age: 66
End: 2021-03-08
Payer: MEDICARE

## 2021-03-08 DIAGNOSIS — M25.561 BILATERAL CHRONIC KNEE PAIN: ICD-10-CM

## 2021-03-08 DIAGNOSIS — G89.29 BILATERAL CHRONIC KNEE PAIN: ICD-10-CM

## 2021-03-08 DIAGNOSIS — M62.81 MUSCLE WEAKNESS: ICD-10-CM

## 2021-03-08 DIAGNOSIS — M25.562 BILATERAL CHRONIC KNEE PAIN: ICD-10-CM

## 2021-03-08 LAB — NONINV COLON CA DNA+OCC BLD SCRN STL QL: NEGATIVE

## 2021-03-08 PROCEDURE — 97110 THERAPEUTIC EXERCISES: CPT | Mod: PN

## 2021-03-09 ENCOUNTER — PATIENT MESSAGE (OUTPATIENT)
Dept: INTERNAL MEDICINE | Facility: CLINIC | Age: 66
End: 2021-03-09

## 2021-03-11 ENCOUNTER — CLINICAL SUPPORT (OUTPATIENT)
Dept: REHABILITATION | Facility: HOSPITAL | Age: 66
End: 2021-03-11
Payer: MEDICARE

## 2021-03-11 DIAGNOSIS — M25.561 BILATERAL CHRONIC KNEE PAIN: ICD-10-CM

## 2021-03-11 DIAGNOSIS — G89.29 BILATERAL CHRONIC KNEE PAIN: ICD-10-CM

## 2021-03-11 DIAGNOSIS — M62.81 MUSCLE WEAKNESS: ICD-10-CM

## 2021-03-11 DIAGNOSIS — M25.562 BILATERAL CHRONIC KNEE PAIN: ICD-10-CM

## 2021-03-11 PROCEDURE — 97110 THERAPEUTIC EXERCISES: CPT | Mod: PN

## 2021-03-15 ENCOUNTER — CLINICAL SUPPORT (OUTPATIENT)
Dept: REHABILITATION | Facility: HOSPITAL | Age: 66
End: 2021-03-15
Payer: MEDICARE

## 2021-03-15 DIAGNOSIS — G89.29 BILATERAL CHRONIC KNEE PAIN: ICD-10-CM

## 2021-03-15 DIAGNOSIS — M25.561 BILATERAL CHRONIC KNEE PAIN: ICD-10-CM

## 2021-03-15 DIAGNOSIS — M25.562 BILATERAL CHRONIC KNEE PAIN: ICD-10-CM

## 2021-03-15 DIAGNOSIS — M62.81 MUSCLE WEAKNESS: ICD-10-CM

## 2021-03-15 PROCEDURE — 97110 THERAPEUTIC EXERCISES: CPT | Mod: PN

## 2021-03-17 ENCOUNTER — CLINICAL SUPPORT (OUTPATIENT)
Dept: REHABILITATION | Facility: HOSPITAL | Age: 66
End: 2021-03-17
Payer: MEDICARE

## 2021-03-17 DIAGNOSIS — M25.561 BILATERAL CHRONIC KNEE PAIN: ICD-10-CM

## 2021-03-17 DIAGNOSIS — M25.562 BILATERAL CHRONIC KNEE PAIN: ICD-10-CM

## 2021-03-17 DIAGNOSIS — G89.29 BILATERAL CHRONIC KNEE PAIN: ICD-10-CM

## 2021-03-17 DIAGNOSIS — M62.81 MUSCLE WEAKNESS: ICD-10-CM

## 2021-03-22 ENCOUNTER — CLINICAL SUPPORT (OUTPATIENT)
Dept: REHABILITATION | Facility: HOSPITAL | Age: 66
End: 2021-03-22
Payer: MEDICARE

## 2021-03-22 DIAGNOSIS — M25.562 BILATERAL CHRONIC KNEE PAIN: ICD-10-CM

## 2021-03-22 DIAGNOSIS — M25.561 BILATERAL CHRONIC KNEE PAIN: ICD-10-CM

## 2021-03-22 DIAGNOSIS — M62.81 MUSCLE WEAKNESS: ICD-10-CM

## 2021-03-22 DIAGNOSIS — G89.29 BILATERAL CHRONIC KNEE PAIN: ICD-10-CM

## 2021-03-22 PROCEDURE — 97110 THERAPEUTIC EXERCISES: CPT | Mod: PN

## 2021-03-25 DIAGNOSIS — F32.A DEPRESSION, UNSPECIFIED DEPRESSION TYPE: ICD-10-CM

## 2021-03-26 ENCOUNTER — CLINICAL SUPPORT (OUTPATIENT)
Dept: REHABILITATION | Facility: HOSPITAL | Age: 66
End: 2021-03-26
Payer: MEDICARE

## 2021-03-26 DIAGNOSIS — M25.561 BILATERAL CHRONIC KNEE PAIN: ICD-10-CM

## 2021-03-26 DIAGNOSIS — M62.81 MUSCLE WEAKNESS: ICD-10-CM

## 2021-03-26 DIAGNOSIS — G89.29 BILATERAL CHRONIC KNEE PAIN: ICD-10-CM

## 2021-03-26 DIAGNOSIS — M25.562 BILATERAL CHRONIC KNEE PAIN: ICD-10-CM

## 2021-03-26 PROCEDURE — 97110 THERAPEUTIC EXERCISES: CPT | Mod: PN

## 2021-03-29 ENCOUNTER — CLINICAL SUPPORT (OUTPATIENT)
Dept: REHABILITATION | Facility: HOSPITAL | Age: 66
End: 2021-03-29
Payer: MEDICARE

## 2021-03-29 DIAGNOSIS — M25.561 BILATERAL CHRONIC KNEE PAIN: ICD-10-CM

## 2021-03-29 DIAGNOSIS — M25.562 BILATERAL CHRONIC KNEE PAIN: ICD-10-CM

## 2021-03-29 DIAGNOSIS — G89.29 BILATERAL CHRONIC KNEE PAIN: ICD-10-CM

## 2021-03-29 DIAGNOSIS — M62.81 MUSCLE WEAKNESS: ICD-10-CM

## 2021-03-29 PROCEDURE — 97110 THERAPEUTIC EXERCISES: CPT | Mod: PN

## 2021-03-29 RX ORDER — DIAZEPAM 2 MG/1
2 TABLET ORAL EVERY 6 HOURS PRN
Qty: 60 TABLET | Refills: 2 | Status: SHIPPED | OUTPATIENT
Start: 2021-03-29 | End: 2021-05-24 | Stop reason: SDUPTHER

## 2021-04-01 ENCOUNTER — CLINICAL SUPPORT (OUTPATIENT)
Dept: REHABILITATION | Facility: HOSPITAL | Age: 66
End: 2021-04-01
Payer: MEDICARE

## 2021-04-01 DIAGNOSIS — M25.561 BILATERAL CHRONIC KNEE PAIN: ICD-10-CM

## 2021-04-01 DIAGNOSIS — M25.562 BILATERAL CHRONIC KNEE PAIN: ICD-10-CM

## 2021-04-01 DIAGNOSIS — G89.29 BILATERAL CHRONIC KNEE PAIN: ICD-10-CM

## 2021-04-01 DIAGNOSIS — M62.81 MUSCLE WEAKNESS: ICD-10-CM

## 2021-04-01 PROCEDURE — 97110 THERAPEUTIC EXERCISES: CPT | Mod: PN

## 2021-04-05 ENCOUNTER — CLINICAL SUPPORT (OUTPATIENT)
Dept: REHABILITATION | Facility: HOSPITAL | Age: 66
End: 2021-04-05
Payer: MEDICARE

## 2021-04-05 DIAGNOSIS — G89.29 BILATERAL CHRONIC KNEE PAIN: Primary | ICD-10-CM

## 2021-04-05 DIAGNOSIS — M25.562 BILATERAL CHRONIC KNEE PAIN: Primary | ICD-10-CM

## 2021-04-05 DIAGNOSIS — M25.561 BILATERAL CHRONIC KNEE PAIN: Primary | ICD-10-CM

## 2021-04-05 DIAGNOSIS — M62.81 MUSCLE WEAKNESS: ICD-10-CM

## 2021-04-05 PROCEDURE — 97110 THERAPEUTIC EXERCISES: CPT | Mod: PN,CQ

## 2021-04-08 ENCOUNTER — CLINICAL SUPPORT (OUTPATIENT)
Dept: REHABILITATION | Facility: HOSPITAL | Age: 66
End: 2021-04-08
Payer: MEDICARE

## 2021-04-08 DIAGNOSIS — G89.29 BILATERAL CHRONIC KNEE PAIN: Primary | ICD-10-CM

## 2021-04-08 DIAGNOSIS — M62.81 MUSCLE WEAKNESS: ICD-10-CM

## 2021-04-08 DIAGNOSIS — M25.561 BILATERAL CHRONIC KNEE PAIN: Primary | ICD-10-CM

## 2021-04-08 DIAGNOSIS — M25.562 BILATERAL CHRONIC KNEE PAIN: Primary | ICD-10-CM

## 2021-04-08 PROCEDURE — 97110 THERAPEUTIC EXERCISES: CPT | Mod: PN,CQ

## 2021-04-12 ENCOUNTER — CLINICAL SUPPORT (OUTPATIENT)
Dept: REHABILITATION | Facility: HOSPITAL | Age: 66
End: 2021-04-12
Payer: MEDICARE

## 2021-04-12 DIAGNOSIS — G89.29 BILATERAL CHRONIC KNEE PAIN: ICD-10-CM

## 2021-04-12 DIAGNOSIS — M62.81 MUSCLE WEAKNESS: ICD-10-CM

## 2021-04-12 DIAGNOSIS — M25.561 BILATERAL CHRONIC KNEE PAIN: ICD-10-CM

## 2021-04-12 DIAGNOSIS — M25.562 BILATERAL CHRONIC KNEE PAIN: ICD-10-CM

## 2021-04-12 PROCEDURE — 97112 NEUROMUSCULAR REEDUCATION: CPT | Mod: PN

## 2021-04-12 PROCEDURE — 97110 THERAPEUTIC EXERCISES: CPT | Mod: PN

## 2021-04-15 ENCOUNTER — CLINICAL SUPPORT (OUTPATIENT)
Dept: REHABILITATION | Facility: HOSPITAL | Age: 66
End: 2021-04-15
Payer: MEDICARE

## 2021-04-15 DIAGNOSIS — M25.562 BILATERAL CHRONIC KNEE PAIN: Primary | ICD-10-CM

## 2021-04-15 DIAGNOSIS — M25.561 BILATERAL CHRONIC KNEE PAIN: Primary | ICD-10-CM

## 2021-04-15 DIAGNOSIS — M62.81 MUSCLE WEAKNESS: ICD-10-CM

## 2021-04-15 DIAGNOSIS — G89.29 BILATERAL CHRONIC KNEE PAIN: Primary | ICD-10-CM

## 2021-04-15 PROCEDURE — 97110 THERAPEUTIC EXERCISES: CPT | Mod: PN,CQ

## 2021-04-19 ENCOUNTER — CLINICAL SUPPORT (OUTPATIENT)
Dept: REHABILITATION | Facility: HOSPITAL | Age: 66
End: 2021-04-19
Payer: MEDICARE

## 2021-04-19 DIAGNOSIS — M62.81 MUSCLE WEAKNESS: ICD-10-CM

## 2021-04-19 DIAGNOSIS — M25.562 BILATERAL CHRONIC KNEE PAIN: ICD-10-CM

## 2021-04-19 DIAGNOSIS — G89.29 BILATERAL CHRONIC KNEE PAIN: ICD-10-CM

## 2021-04-19 DIAGNOSIS — M25.561 BILATERAL CHRONIC KNEE PAIN: ICD-10-CM

## 2021-04-19 PROCEDURE — 97110 THERAPEUTIC EXERCISES: CPT | Mod: PN

## 2021-04-22 ENCOUNTER — CLINICAL SUPPORT (OUTPATIENT)
Dept: REHABILITATION | Facility: HOSPITAL | Age: 66
End: 2021-04-22
Payer: MEDICARE

## 2021-04-22 DIAGNOSIS — G89.29 BILATERAL CHRONIC KNEE PAIN: Primary | ICD-10-CM

## 2021-04-22 DIAGNOSIS — M25.562 BILATERAL CHRONIC KNEE PAIN: Primary | ICD-10-CM

## 2021-04-22 DIAGNOSIS — M25.561 BILATERAL CHRONIC KNEE PAIN: Primary | ICD-10-CM

## 2021-04-22 DIAGNOSIS — M62.81 MUSCLE WEAKNESS: ICD-10-CM

## 2021-04-22 PROCEDURE — 97110 THERAPEUTIC EXERCISES: CPT | Mod: PN,CQ

## 2021-04-26 ENCOUNTER — CLINICAL SUPPORT (OUTPATIENT)
Dept: REHABILITATION | Facility: HOSPITAL | Age: 66
End: 2021-04-26
Payer: MEDICARE

## 2021-04-26 DIAGNOSIS — M25.562 BILATERAL CHRONIC KNEE PAIN: ICD-10-CM

## 2021-04-26 DIAGNOSIS — M25.561 BILATERAL CHRONIC KNEE PAIN: ICD-10-CM

## 2021-04-26 DIAGNOSIS — M62.81 MUSCLE WEAKNESS: ICD-10-CM

## 2021-04-26 DIAGNOSIS — G89.29 BILATERAL CHRONIC KNEE PAIN: ICD-10-CM

## 2021-04-26 PROCEDURE — 97110 THERAPEUTIC EXERCISES: CPT | Mod: PN

## 2021-04-29 ENCOUNTER — CLINICAL SUPPORT (OUTPATIENT)
Dept: REHABILITATION | Facility: HOSPITAL | Age: 66
End: 2021-04-29
Payer: MEDICARE

## 2021-04-29 DIAGNOSIS — G89.29 BILATERAL CHRONIC KNEE PAIN: Primary | ICD-10-CM

## 2021-04-29 DIAGNOSIS — M25.562 BILATERAL CHRONIC KNEE PAIN: Primary | ICD-10-CM

## 2021-04-29 DIAGNOSIS — M25.561 BILATERAL CHRONIC KNEE PAIN: Primary | ICD-10-CM

## 2021-04-29 DIAGNOSIS — M62.81 MUSCLE WEAKNESS: ICD-10-CM

## 2021-04-29 PROCEDURE — 97110 THERAPEUTIC EXERCISES: CPT | Mod: PN,CQ

## 2021-05-10 ENCOUNTER — CLINICAL SUPPORT (OUTPATIENT)
Dept: REHABILITATION | Facility: HOSPITAL | Age: 66
End: 2021-05-10
Payer: MEDICARE

## 2021-05-10 DIAGNOSIS — G89.29 BILATERAL CHRONIC KNEE PAIN: ICD-10-CM

## 2021-05-10 DIAGNOSIS — M25.562 BILATERAL CHRONIC KNEE PAIN: ICD-10-CM

## 2021-05-10 DIAGNOSIS — M62.81 MUSCLE WEAKNESS: ICD-10-CM

## 2021-05-10 DIAGNOSIS — M25.561 BILATERAL CHRONIC KNEE PAIN: ICD-10-CM

## 2021-05-10 PROCEDURE — 97110 THERAPEUTIC EXERCISES: CPT | Mod: PN

## 2021-05-13 ENCOUNTER — CLINICAL SUPPORT (OUTPATIENT)
Dept: REHABILITATION | Facility: HOSPITAL | Age: 66
End: 2021-05-13
Payer: MEDICARE

## 2021-05-13 DIAGNOSIS — M25.562 BILATERAL CHRONIC KNEE PAIN: ICD-10-CM

## 2021-05-13 DIAGNOSIS — G89.29 BILATERAL CHRONIC KNEE PAIN: ICD-10-CM

## 2021-05-13 DIAGNOSIS — M62.81 MUSCLE WEAKNESS: ICD-10-CM

## 2021-05-13 DIAGNOSIS — M25.561 BILATERAL CHRONIC KNEE PAIN: ICD-10-CM

## 2021-05-13 PROCEDURE — 97110 THERAPEUTIC EXERCISES: CPT | Mod: PN

## 2021-05-17 ENCOUNTER — CLINICAL SUPPORT (OUTPATIENT)
Dept: REHABILITATION | Facility: HOSPITAL | Age: 66
End: 2021-05-17
Payer: MEDICARE

## 2021-05-17 DIAGNOSIS — M25.562 BILATERAL CHRONIC KNEE PAIN: ICD-10-CM

## 2021-05-17 DIAGNOSIS — M25.561 BILATERAL CHRONIC KNEE PAIN: ICD-10-CM

## 2021-05-17 DIAGNOSIS — G89.29 BILATERAL CHRONIC KNEE PAIN: ICD-10-CM

## 2021-05-17 DIAGNOSIS — M62.81 MUSCLE WEAKNESS: ICD-10-CM

## 2021-05-17 PROCEDURE — 97110 THERAPEUTIC EXERCISES: CPT | Mod: PN

## 2021-05-20 ENCOUNTER — CLINICAL SUPPORT (OUTPATIENT)
Dept: REHABILITATION | Facility: HOSPITAL | Age: 66
End: 2021-05-20
Payer: MEDICARE

## 2021-05-20 DIAGNOSIS — M25.562 BILATERAL CHRONIC KNEE PAIN: Primary | ICD-10-CM

## 2021-05-20 DIAGNOSIS — M25.561 BILATERAL CHRONIC KNEE PAIN: Primary | ICD-10-CM

## 2021-05-20 DIAGNOSIS — G89.29 BILATERAL CHRONIC KNEE PAIN: Primary | ICD-10-CM

## 2021-05-20 PROCEDURE — 97110 THERAPEUTIC EXERCISES: CPT | Mod: PN

## 2021-05-24 DIAGNOSIS — F32.A DEPRESSION, UNSPECIFIED DEPRESSION TYPE: ICD-10-CM

## 2021-05-24 RX ORDER — DIAZEPAM 2 MG/1
2 TABLET ORAL EVERY 6 HOURS PRN
Qty: 60 TABLET | Refills: 2 | Status: SHIPPED | OUTPATIENT
Start: 2021-05-24 | End: 2021-08-23 | Stop reason: SDUPTHER

## 2021-06-11 DIAGNOSIS — F32.A DEPRESSION, UNSPECIFIED DEPRESSION TYPE: ICD-10-CM

## 2021-06-11 RX ORDER — DIAZEPAM 2 MG/1
2 TABLET ORAL EVERY 6 HOURS PRN
Qty: 60 TABLET | Refills: 2 | Status: CANCELLED | OUTPATIENT
Start: 2021-06-11

## 2021-08-23 DIAGNOSIS — F32.A DEPRESSION, UNSPECIFIED DEPRESSION TYPE: ICD-10-CM

## 2021-08-24 RX ORDER — DIAZEPAM 2 MG/1
2 TABLET ORAL EVERY 6 HOURS PRN
Qty: 60 TABLET | Refills: 2 | Status: SHIPPED | OUTPATIENT
Start: 2021-08-24 | End: 2021-11-02 | Stop reason: SDUPTHER

## 2021-11-02 DIAGNOSIS — F32.A DEPRESSION, UNSPECIFIED DEPRESSION TYPE: ICD-10-CM

## 2021-11-02 RX ORDER — DIAZEPAM 2 MG/1
2 TABLET ORAL EVERY 6 HOURS PRN
Qty: 60 TABLET | Refills: 2 | Status: SHIPPED | OUTPATIENT
Start: 2021-11-02 | End: 2022-01-09 | Stop reason: SDUPTHER

## 2022-01-05 DIAGNOSIS — F32.A DEPRESSION, UNSPECIFIED DEPRESSION TYPE: ICD-10-CM

## 2022-01-05 DIAGNOSIS — I10 BENIGN ESSENTIAL HYPERTENSION: ICD-10-CM

## 2022-01-05 DIAGNOSIS — E78.5 HYPERLIPIDEMIA, UNSPECIFIED HYPERLIPIDEMIA TYPE: ICD-10-CM

## 2022-01-05 NOTE — TELEPHONE ENCOUNTER
No new care gaps identified.  Powered by [employer]. Reference number: [id number].   1/05/2022 5:08:55 PM CST

## 2022-01-06 RX ORDER — CITALOPRAM 20 MG/1
TABLET, FILM COATED ORAL
Qty: 90 TABLET | Refills: 0 | Status: SHIPPED | OUTPATIENT
Start: 2022-01-06 | End: 2022-05-19

## 2022-01-06 RX ORDER — AMLODIPINE BESYLATE 5 MG/1
TABLET ORAL
Qty: 90 TABLET | Refills: 0 | Status: SHIPPED | OUTPATIENT
Start: 2022-01-06 | End: 2022-05-19

## 2022-01-06 RX ORDER — ATORVASTATIN CALCIUM 40 MG/1
TABLET, FILM COATED ORAL
Qty: 90 TABLET | Refills: 0 | Status: SHIPPED | OUTPATIENT
Start: 2022-01-06 | End: 2022-05-19

## 2022-01-06 NOTE — TELEPHONE ENCOUNTER
Refill Authorization Note   Lucas Mayer  is requesting a refill authorization.  Brief Assessment and Rationale for Refill:  Approve     Medication Therapy Plan:       Medication Reconciliation Completed: No   Comments:   --->Care Gap information included below if applicable.   Orders Placed This Encounter    citalopram (CELEXA) 20 MG tablet    atorvastatin (LIPITOR) 40 MG tablet    amLODIPine (NORVASC) 5 MG tablet      Requested Prescriptions   Signed Prescriptions Disp Refills    citalopram (CELEXA) 20 MG tablet 90 tablet 0     Sig: TAKE 1 TABLET BY MOUTH ONCE DAILY       Psychiatry:  Antidepressants - SSRI Passed - 1/5/2022  5:08 PM        Passed - Patient is at least 18 years old        Passed - Valid encounter within last 15 months     Recent Visits  Date Type Provider Dept   02/22/21 Office Visit Jean Carlos Samuels MD [facility] Internal Medicine   Showing recent visits within past 720 days and meeting all other requirements  Future Appointments  No visits were found meeting these conditions.  Showing future appointments within next 150 days and meeting all other requirements                  atorvastatin (LIPITOR) 40 MG tablet 90 tablet 0     Sig: TAKE 1 TABLET BY MOUTH ONCE DAILY       Cardiovascular:  Antilipid - Statins Passed - 1/5/2022  5:08 PM        Passed - Patient is at least 18 years old        Passed - Valid encounter within last 15 months     Recent Visits  Date Type Provider Dept   02/22/21 Office Visit Jea nCarlos Samuels MD [facility] Internal Medicine   Showing recent visits within past 720 days and meeting all other requirements  Future Appointments  No visits were found meeting these conditions.  Showing future appointments within next 150 days and meeting all other requirements                Passed - ALT is 131 or below and within 360 days     ALT   Date Value Ref Range Status   01/15/2021 31 10 - 44 U/L Final   09/27/2019 28 10 - 44 U/L Final   08/03/2018 22 10 - 44 U/L Final              Passed  - AST is 119 or below and within 360 days     AST   Date Value Ref Range Status   01/15/2021 22 10 - 40 U/L Final   09/27/2019 21 10 - 40 U/L Final   08/03/2018 20 10 - 40 U/L Final              Passed - Total Cholesterol within 360 days     Lab Results   Component Value Date    CHOL 176 01/15/2021    CHOL 164 09/27/2019    CHOL 150 08/03/2018              Passed - LDL within 360 days     LDL Cholesterol   Date Value Ref Range Status   01/15/2021 108.0 63.0 - 159.0 mg/dL Final     Comment:     The National Cholesterol Education Program (NCEP) has set the  following guidelines (reference values) for LDL Cholesterol:  Optimal.......................<130 mg/dL  Borderline High...............130-159 mg/dL  High..........................160-189 mg/dL  Very High.....................>190 mg/dL              Passed - HDL within 360 days     HDL   Date Value Ref Range Status   01/15/2021 46 40 - 75 mg/dL Final     Comment:     The National Cholesterol Education Program (NCEP) has set the  following guidelines (reference values) for HDL Cholesterol:  Low...............<40 mg/dL  Optimal...........>60 mg/dL              Passed - Triglycerides within 360 days     Lab Results   Component Value Date    TRIG 110 01/15/2021    TRIG 100 09/27/2019    TRIG 86 08/03/2018                amLODIPine (NORVASC) 5 MG tablet 90 tablet 0     Sig: TAKE 1 TABLET BY MOUTH ONCE DAILY       Cardiovascular:  Calcium Channel Blockers Passed - 1/5/2022  5:08 PM        Passed - Patient is at least 18 years old        Passed - Last BP in normal range within 360 days     BP Readings from Last 1 Encounters:   02/22/21 124/60               Passed - Valid encounter within last 15 months     Recent Visits  Date Type Provider Dept   02/22/21 Office Visit Jean Carlos Samuels MD [facility] Internal Medicine   Showing recent visits within past 720 days and meeting all other requirements  Future Appointments  No visits were found meeting these conditions.  Showing  future appointments within next 150 days and meeting all other requirements                    Appointments  past 12m or future 3m with PCP    Date Provider   Last Visit   2/22/2021 Jean Carlos Samuels MD   Next Visit   Visit date not found Jean Carlos Samuesl MD   ED visits in past 90 days: 0     Note composed:3:52 PM 01/06/2022

## 2022-01-09 DIAGNOSIS — F32.A DEPRESSION, UNSPECIFIED DEPRESSION TYPE: ICD-10-CM

## 2022-01-09 NOTE — TELEPHONE ENCOUNTER
No new care gaps identified.  Powered by [employer]. Reference number: [id number].   1/09/2022 8:43:09 AM CST

## 2022-01-10 RX ORDER — DIAZEPAM 2 MG/1
2 TABLET ORAL EVERY 6 HOURS PRN
Qty: 60 TABLET | Refills: 2 | Status: SHIPPED | OUTPATIENT
Start: 2022-01-10 | End: 2022-03-14 | Stop reason: SDUPTHER

## 2022-03-14 DIAGNOSIS — F32.A DEPRESSION, UNSPECIFIED DEPRESSION TYPE: ICD-10-CM

## 2022-03-14 NOTE — TELEPHONE ENCOUNTER
Care Due:                  Date            Visit Type   Department     Provider  --------------------------------------------------------------------------------                                MYCHART                              ANNUAL                              CHECKUP/PHY  [facility] INTERNAL  Last Visit: 02-      S            HARISH Samuels  Next Visit: None Scheduled  None         None Found                                                            Last  Test          Frequency    Reason                     Performed    Due Date  --------------------------------------------------------------------------------    Office Visit  12 months..  atorvastatin, citalopram.  02- 02-    CMP.........  12 months..  atorvastatin.............  01-   01-    Lipid Panel.  12 months..  atorvastatin.............  01-   01-    Powered by [email or web address]. Reference number: [id number].   3/14/2022 9:44:00 AM CDT

## 2022-03-15 RX ORDER — DIAZEPAM 2 MG/1
2 TABLET ORAL EVERY 8 HOURS PRN
Qty: 60 TABLET | Refills: 2 | Status: SHIPPED | OUTPATIENT
Start: 2022-03-15 | End: 2022-06-10 | Stop reason: SDUPTHER

## 2022-03-29 ENCOUNTER — PES CALL (OUTPATIENT)
Dept: ADMINISTRATIVE | Facility: CLINIC | Age: 67
End: 2022-03-29
Payer: MEDICARE

## 2022-04-13 ENCOUNTER — PATIENT MESSAGE (OUTPATIENT)
Dept: ADMINISTRATIVE | Facility: HOSPITAL | Age: 67
End: 2022-04-13
Payer: MEDICARE

## 2022-04-13 ENCOUNTER — PATIENT OUTREACH (OUTPATIENT)
Dept: ADMINISTRATIVE | Facility: HOSPITAL | Age: 67
End: 2022-04-13
Payer: MEDICARE

## 2022-04-22 ENCOUNTER — PATIENT OUTREACH (OUTPATIENT)
Dept: ADMINISTRATIVE | Facility: HOSPITAL | Age: 67
End: 2022-04-22
Payer: MEDICARE

## 2022-04-22 DIAGNOSIS — E78.5 HYPERLIPIDEMIA, UNSPECIFIED HYPERLIPIDEMIA TYPE: ICD-10-CM

## 2022-04-22 DIAGNOSIS — Z78.0 POSTMENOPAUSAL: ICD-10-CM

## 2022-04-22 DIAGNOSIS — Z00.00 INITIAL MEDICARE ANNUAL WELLNESS VISIT: ICD-10-CM

## 2022-04-22 DIAGNOSIS — I10 BENIGN HYPERTENSION: Primary | ICD-10-CM

## 2022-05-02 ENCOUNTER — OFFICE VISIT (OUTPATIENT)
Dept: OPTOMETRY | Facility: CLINIC | Age: 67
End: 2022-05-02
Payer: COMMERCIAL

## 2022-05-02 DIAGNOSIS — Z96.1 PSEUDOPHAKIA OF BOTH EYES: ICD-10-CM

## 2022-05-02 DIAGNOSIS — H02.834 DERMATOCHALASIS OF BOTH UPPER EYELIDS: ICD-10-CM

## 2022-05-02 DIAGNOSIS — H02.831 DERMATOCHALASIS OF BOTH UPPER EYELIDS: ICD-10-CM

## 2022-05-02 DIAGNOSIS — Z01.00 ROUTINE EYE EXAM: Primary | ICD-10-CM

## 2022-05-02 DIAGNOSIS — H04.203 BILATERAL EPIPHORA: ICD-10-CM

## 2022-05-02 PROCEDURE — 92004 PR EYE EXAM, NEW PATIENT,COMPREHESV: ICD-10-PCS | Mod: S$GLB,,, | Performed by: OPTOMETRIST

## 2022-05-02 PROCEDURE — 99999 PR PBB SHADOW E&M-EST. PATIENT-LVL III: CPT | Mod: PBBFAC,,, | Performed by: OPTOMETRIST

## 2022-05-02 PROCEDURE — 99999 PR PBB SHADOW E&M-EST. PATIENT-LVL III: ICD-10-PCS | Mod: PBBFAC,,, | Performed by: OPTOMETRIST

## 2022-05-02 PROCEDURE — 92004 COMPRE OPH EXAM NEW PT 1/>: CPT | Mod: S$GLB,,, | Performed by: OPTOMETRIST

## 2022-05-02 RX ORDER — LANOLIN ALCOHOL/MO/W.PET/CERES
1 CREAM (GRAM) TOPICAL 2 TIMES DAILY
COMMUNITY

## 2022-05-02 NOTE — PROGRESS NOTES
HPI     Last eye exam was 4/9/19 with Dr. Zhang.  Patient states vision is sometimes blurry. Also OU water constantly and   will sometimes wake up with out corners of her eyes red and irritated.   Happy with OTC readers.   Patient denies diplopia, headaches, flashes/floaters, and pain.    Refresh prn OU    Last edited by Sydney aBrrera MA on 5/2/2022  1:10 PM. (History)            Assessment /Plan     For exam results, see Encounter Report.    Routine eye exam    Pseudophakia of both eyes    Bilateral epiphora    Dermatochalasis of both upper eyelids          1-2.  No rx given.  Continue with OTC readers.   Retina flat and intact OU--no holes, tears, breaks, or RDs.  Eye health normal OU.  3-4.  L>R  Feel contact tearing caused by eyelids.  Puncta very small(almost closed) as well.  Conjunctivochalasis OS.  Refer to Dr. Jaimes for further evaluation.

## 2022-05-05 ENCOUNTER — TELEPHONE (OUTPATIENT)
Dept: DERMATOLOGY | Facility: CLINIC | Age: 67
End: 2022-05-05

## 2022-05-05 ENCOUNTER — OFFICE VISIT (OUTPATIENT)
Dept: DERMATOLOGY | Facility: CLINIC | Age: 67
End: 2022-05-05
Payer: MEDICARE

## 2022-05-05 DIAGNOSIS — L68.0 HIRSUTISM: ICD-10-CM

## 2022-05-05 DIAGNOSIS — Z12.83 ENCOUNTER FOR SCREENING FOR MALIGNANT NEOPLASM OF SKIN: Primary | ICD-10-CM

## 2022-05-05 DIAGNOSIS — L30.4 INTERTRIGO: ICD-10-CM

## 2022-05-05 DIAGNOSIS — L82.1 SEBORRHEIC KERATOSES: ICD-10-CM

## 2022-05-05 DIAGNOSIS — D22.9 MULTIPLE BENIGN NEVI: ICD-10-CM

## 2022-05-05 DIAGNOSIS — L81.4 LENTIGO: ICD-10-CM

## 2022-05-05 DIAGNOSIS — D18.00 HEMANGIOMA, UNSPECIFIED SITE: ICD-10-CM

## 2022-05-05 PROCEDURE — 1159F MED LIST DOCD IN RCRD: CPT | Mod: CPTII,S$GLB,, | Performed by: STUDENT IN AN ORGANIZED HEALTH CARE EDUCATION/TRAINING PROGRAM

## 2022-05-05 PROCEDURE — 1101F PR PT FALLS ASSESS DOC 0-1 FALLS W/OUT INJ PAST YR: ICD-10-PCS | Mod: CPTII,S$GLB,, | Performed by: STUDENT IN AN ORGANIZED HEALTH CARE EDUCATION/TRAINING PROGRAM

## 2022-05-05 PROCEDURE — 1101F PT FALLS ASSESS-DOCD LE1/YR: CPT | Mod: CPTII,S$GLB,, | Performed by: STUDENT IN AN ORGANIZED HEALTH CARE EDUCATION/TRAINING PROGRAM

## 2022-05-05 PROCEDURE — 3288F PR FALLS RISK ASSESSMENT DOCUMENTED: ICD-10-PCS | Mod: CPTII,S$GLB,, | Performed by: STUDENT IN AN ORGANIZED HEALTH CARE EDUCATION/TRAINING PROGRAM

## 2022-05-05 PROCEDURE — 1160F PR REVIEW ALL MEDS BY PRESCRIBER/CLIN PHARMACIST DOCUMENTED: ICD-10-PCS | Mod: CPTII,S$GLB,, | Performed by: STUDENT IN AN ORGANIZED HEALTH CARE EDUCATION/TRAINING PROGRAM

## 2022-05-05 PROCEDURE — 1126F AMNT PAIN NOTED NONE PRSNT: CPT | Mod: CPTII,S$GLB,, | Performed by: STUDENT IN AN ORGANIZED HEALTH CARE EDUCATION/TRAINING PROGRAM

## 2022-05-05 PROCEDURE — 1159F PR MEDICATION LIST DOCUMENTED IN MEDICAL RECORD: ICD-10-PCS | Mod: CPTII,S$GLB,, | Performed by: STUDENT IN AN ORGANIZED HEALTH CARE EDUCATION/TRAINING PROGRAM

## 2022-05-05 PROCEDURE — 1126F PR PAIN SEVERITY QUANTIFIED, NO PAIN PRESENT: ICD-10-PCS | Mod: CPTII,S$GLB,, | Performed by: STUDENT IN AN ORGANIZED HEALTH CARE EDUCATION/TRAINING PROGRAM

## 2022-05-05 PROCEDURE — 99999 PR PBB SHADOW E&M-EST. PATIENT-LVL II: ICD-10-PCS | Mod: PBBFAC,,, | Performed by: STUDENT IN AN ORGANIZED HEALTH CARE EDUCATION/TRAINING PROGRAM

## 2022-05-05 PROCEDURE — 99999 PR PBB SHADOW E&M-EST. PATIENT-LVL II: CPT | Mod: PBBFAC,,, | Performed by: STUDENT IN AN ORGANIZED HEALTH CARE EDUCATION/TRAINING PROGRAM

## 2022-05-05 PROCEDURE — 3288F FALL RISK ASSESSMENT DOCD: CPT | Mod: CPTII,S$GLB,, | Performed by: STUDENT IN AN ORGANIZED HEALTH CARE EDUCATION/TRAINING PROGRAM

## 2022-05-05 PROCEDURE — 1160F RVW MEDS BY RX/DR IN RCRD: CPT | Mod: CPTII,S$GLB,, | Performed by: STUDENT IN AN ORGANIZED HEALTH CARE EDUCATION/TRAINING PROGRAM

## 2022-05-05 PROCEDURE — 99204 OFFICE O/P NEW MOD 45 MIN: CPT | Mod: S$GLB,,, | Performed by: STUDENT IN AN ORGANIZED HEALTH CARE EDUCATION/TRAINING PROGRAM

## 2022-05-05 PROCEDURE — 99204 PR OFFICE/OUTPT VISIT, NEW, LEVL IV, 45-59 MIN: ICD-10-PCS | Mod: S$GLB,,, | Performed by: STUDENT IN AN ORGANIZED HEALTH CARE EDUCATION/TRAINING PROGRAM

## 2022-05-05 RX ORDER — KETOCONAZOLE 20 MG/G
CREAM TOPICAL
Qty: 60 G | Refills: 3 | Status: SHIPPED | OUTPATIENT
Start: 2022-05-05 | End: 2023-11-06

## 2022-05-05 RX ORDER — EFLORNITHINE HYDROCHLORIDE 139 MG/G
CREAM TOPICAL
Qty: 45 G | Refills: 3 | Status: SHIPPED | OUTPATIENT
Start: 2022-05-05 | End: 2023-11-06

## 2022-05-05 RX ORDER — EFLORNITHINE HYDROCHLORIDE 139 MG/G
CREAM TOPICAL
Qty: 45 G | Refills: 3 | OUTPATIENT
Start: 2022-05-05 | End: 2022-05-05

## 2022-05-05 NOTE — PROGRESS NOTES
Subjective:       Patient ID:  Lucas Mayer is a 66 y.o. female who presents for   Chief Complaint   Patient presents with    Skin Check     TBSE     Pt here for TBSE  Last seen about 5 years ago  No h/o nmsc or mm    Pt ahs few concerning spots today. Spots on knees are tender.    Also c/f rash in groin x weeks. Irritated, suing neosporin.      Review of Systems   Constitutional: Negative.    Skin: Positive for activity-related sunscreen use. Negative for daily sunscreen use and recent sunburn.   Hematologic/Lymphatic: Does not bruise/bleed easily.        Objective:    Physical Exam   Constitutional: She appears well-developed and well-nourished. No distress.   Neurological: She is alert and oriented to person, place, and time. She is not disoriented.   Psychiatric: She has a normal mood and affect.   Skin:   Areas Examined (abnormalities noted in diagram):   Scalp / Hair Palpated and Inspected  Head / Face Inspection Performed  Neck Inspection Performed  Chest / Axilla Inspection Performed  Abdomen Inspection Performed  Genitals / Buttocks / Groin Inspection Performed  Back Inspection Performed  RUE Inspected  LUE Inspection Performed  RLE Inspected  LLE Inspection Performed  Nails and Digits Inspection Performed                       Diagram Legend     Erythematous scaling macule/papule c/w actinic keratosis       Vascular papule c/w angioma      Pigmented verrucoid papule/plaque c/w seborrheic keratosis      Yellow umbilicated papule c/w sebaceous hyperplasia      Irregularly shaped tan macule c/w lentigo     1-2 mm smooth white papules consistent with Milia      Movable subcutaneous cyst with punctum c/w epidermal inclusion cyst      Subcutaneous movable cyst c/w pilar cyst      Firm pink to brown papule c/w dermatofibroma      Pedunculated fleshy papule(s) c/w skin tag(s)      Evenly pigmented macule c/w junctional nevus     Mildly variegated pigmented, slightly irregular-bordered macule c/w mildly  atypical nevus      Flesh colored to evenly pigmented papule c/w intradermal nevus       Pink pearly papule/plaque c/w basal cell carcinoma      Erythematous hyperkeratotic cursted plaque c/w SCC      Surgical scar with no sign of skin cancer recurrence      Open and closed comedones      Inflammatory papules and pustules      Verrucoid papule consistent consistent with wart     Erythematous eczematous patches and plaques     Dystrophic onycholytic nail with subungual debris c/w onychomycosis     Umbilicated papule    Erythematous-base heme-crusted tan verrucoid plaque consistent with inflamed seborrheic keratosis     Erythematous Silvery Scaling Plaque c/w Psoriasis     See annotation      Assessment / Plan:        Encounter for screening for malignant neoplasm of skin  Total body skin examination performed today including at least 12 points as noted in physical examination. No lesions suspicious for malignancy noted.    Recommend daily sun protection/avoidance, use of at least SPF 30, broad spectrum sunscreen (OTC drug), skin self examinations, and routine physician surveillance to optimize early detection    Hemangioma, unspecified site  This is a benign vascular lesion. Reassurance given. No treatment required.     Lentigo  This is a benign hyperpigmented sun induced lesion. Recommend daily sun protection/avoidance and use of at least SPF 30, broad spectrum sunscreen (OTC drug) will reduce the number of new lesions. Treatment of these benign lesions are considered cosmetic.    Seborrheic keratoses  These are benign inherited growths without a malignant potential. Reassurance given to patient. No treatment is necessary.     Multiple benign nevi  Discussed ABCDE's of nevi.  Monitor for new mole or moles that are becoming bigger, darker, irritated, or developing irregular borders. Brochure provided. Instructed patient to observe lesion(s) for changes and follow up in clinic if changes are noted. Patient to monitor  skin at home for new or changing lesions.     Hirsutism  Start VANIQA 13.9 % cream; AAA bid  Dispense: 45 g; Refill: 3    Intertrigo, flaring  Start KCZ cream BID  Keep area cool and dry  Can use water:vinegar soaks           No follow-ups on file.

## 2022-05-05 NOTE — TELEPHONE ENCOUNTER
----- Message from Emelina Szymanski sent at 5/5/2022  3:56 PM CDT -----      Please contyact patient to advise regarding a cream she says Dr. Carmona was going to prescribe for her to put where the skin hangs over. Vaniqua is listed but she says it was something else.    Pharmacy - [facility] listed in file     Patient can be contacted @# [phone]

## 2022-05-05 NOTE — TELEPHONE ENCOUNTER
Spoke to pt  Informed prescription has been sent to pharmacy listed in chart  Pt stated she understood and thanked me for the call

## 2022-05-18 DIAGNOSIS — E78.5 HYPERLIPIDEMIA, UNSPECIFIED HYPERLIPIDEMIA TYPE: ICD-10-CM

## 2022-05-18 DIAGNOSIS — F32.A DEPRESSION, UNSPECIFIED DEPRESSION TYPE: ICD-10-CM

## 2022-05-18 DIAGNOSIS — I10 BENIGN ESSENTIAL HYPERTENSION: ICD-10-CM

## 2022-05-19 RX ORDER — AMLODIPINE BESYLATE 5 MG/1
TABLET ORAL
Qty: 90 TABLET | Refills: 2 | Status: SHIPPED | OUTPATIENT
Start: 2022-05-19 | End: 2023-05-04

## 2022-05-19 RX ORDER — ATORVASTATIN CALCIUM 40 MG/1
TABLET, FILM COATED ORAL
Qty: 90 TABLET | Refills: 2 | Status: SHIPPED | OUTPATIENT
Start: 2022-05-19 | End: 2023-05-04

## 2022-05-19 RX ORDER — CITALOPRAM 20 MG/1
TABLET, FILM COATED ORAL
Qty: 90 TABLET | Refills: 0 | Status: SHIPPED | OUTPATIENT
Start: 2022-05-19 | End: 2022-08-15

## 2022-05-19 NOTE — TELEPHONE ENCOUNTER
Care Due:                  Date            Visit Type   Department     Provider  --------------------------------------------------------------------------------                                MYCHART                              ANNUAL                              CHECKUP/PHY  [facility] INTERNAL  Last Visit: 02-      S            HARISH Samuels                              EP -                              PRIMARY      [facility] INTERNAL  Next Visit: 05-      CARE ([facility])   MEDICINE       Jean Carlos Samuels                                                            Last  Test          Frequency    Reason                     Performed    Due Date  --------------------------------------------------------------------------------    CMP.........  12 months..  atorvastatin.............  01-   01-    Lipid Panel.  12 months..  atorvastatin.............  01-   01-    [employer] Embedded Care Gaps. Reference number: [id number]. 5/18/2022   9:00:43 PM CDT

## 2022-05-19 NOTE — TELEPHONE ENCOUNTER
Refill Routing Note   Medication(s) are not appropriate for processing by [facility] for the following reason(s):      - Required laboratory values are outdated  - Required vitals are outdated    [facility] action(s):  Defer  Approve Medication-related problems identified:   Requires labs  Requires appointment     Medication Therapy Plan: Defer: Atorvastatin, Amlodipine; Approve: Citalopram; FOVS; Labs Scheduled  Medication reconciliation completed: No     Appointments  past 12m or future 3m with PCP    Date Provider   Last Visit   2/22/2021 Jean Carlos Samuels MD   Next Visit   5/25/2022 Jean Carlos Samuels MD   ED visits in past 90 days: 0        Note composed:11:31 AM 05/19/2022

## 2022-05-23 ENCOUNTER — HOSPITAL ENCOUNTER (OUTPATIENT)
Dept: RADIOLOGY | Facility: HOSPITAL | Age: 67
Discharge: HOME | End: 2022-05-23
Attending: INTERNAL MEDICINE
Payer: MEDICARE

## 2022-05-23 DIAGNOSIS — Z78.0 POSTMENOPAUSAL: ICD-10-CM

## 2022-05-23 PROCEDURE — 77080 DEXA BONE DENSITY SPINE HIP: ICD-10-PCS | Mod: 26,,, | Performed by: RADIOLOGY

## 2022-05-23 PROCEDURE — 77080 DXA BONE DENSITY AXIAL: CPT | Mod: TC

## 2022-05-23 PROCEDURE — 77080 DXA BONE DENSITY AXIAL: CPT | Mod: 26,,, | Performed by: RADIOLOGY

## 2022-05-25 ENCOUNTER — TELEPHONE (OUTPATIENT)
Dept: INTERNAL MEDICINE | Facility: CLINIC | Age: 67
End: 2022-05-25

## 2022-05-25 ENCOUNTER — OFFICE VISIT (OUTPATIENT)
Dept: INTERNAL MEDICINE | Facility: CLINIC | Age: 67
End: 2022-05-25
Attending: INTERNAL MEDICINE
Payer: MEDICARE

## 2022-05-25 VITALS
OXYGEN SATURATION: 98 % | HEIGHT: 62 IN | WEIGHT: 212.06 LBS | HEART RATE: 66 BPM | DIASTOLIC BLOOD PRESSURE: 60 MMHG | BODY MASS INDEX: 39.02 KG/M2 | SYSTOLIC BLOOD PRESSURE: 122 MMHG

## 2022-05-25 DIAGNOSIS — M25.562 BILATERAL CHRONIC KNEE PAIN: ICD-10-CM

## 2022-05-25 DIAGNOSIS — E83.52 HYPERCALCEMIA: ICD-10-CM

## 2022-05-25 DIAGNOSIS — R42 VERTIGO: ICD-10-CM

## 2022-05-25 DIAGNOSIS — I10 BENIGN HYPERTENSION: ICD-10-CM

## 2022-05-25 DIAGNOSIS — H49.11: ICD-10-CM

## 2022-05-25 DIAGNOSIS — M25.561 BILATERAL CHRONIC KNEE PAIN: ICD-10-CM

## 2022-05-25 DIAGNOSIS — M25.562 CHRONIC PAIN OF BOTH KNEES: ICD-10-CM

## 2022-05-25 DIAGNOSIS — Z00.00 ANNUAL PHYSICAL EXAM: Primary | ICD-10-CM

## 2022-05-25 DIAGNOSIS — E78.00 PURE HYPERCHOLESTEROLEMIA: ICD-10-CM

## 2022-05-25 DIAGNOSIS — G89.29 CHRONIC PAIN OF BOTH KNEES: ICD-10-CM

## 2022-05-25 DIAGNOSIS — G89.29 BILATERAL CHRONIC KNEE PAIN: ICD-10-CM

## 2022-05-25 DIAGNOSIS — M25.561 CHRONIC PAIN OF BOTH KNEES: ICD-10-CM

## 2022-05-25 PROCEDURE — 3074F PR MOST RECENT SYSTOLIC BLOOD PRESSURE < 130 MM HG: ICD-10-PCS | Mod: CPTII,S$GLB,, | Performed by: INTERNAL MEDICINE

## 2022-05-25 PROCEDURE — 1101F PR PT FALLS ASSESS DOC 0-1 FALLS W/OUT INJ PAST YR: ICD-10-PCS | Mod: CPTII,S$GLB,, | Performed by: INTERNAL MEDICINE

## 2022-05-25 PROCEDURE — 3008F BODY MASS INDEX DOCD: CPT | Mod: CPTII,S$GLB,, | Performed by: INTERNAL MEDICINE

## 2022-05-25 PROCEDURE — 3288F PR FALLS RISK ASSESSMENT DOCUMENTED: ICD-10-PCS | Mod: CPTII,S$GLB,, | Performed by: INTERNAL MEDICINE

## 2022-05-25 PROCEDURE — 1160F PR REVIEW ALL MEDS BY PRESCRIBER/CLIN PHARMACIST DOCUMENTED: ICD-10-PCS | Mod: CPTII,S$GLB,, | Performed by: INTERNAL MEDICINE

## 2022-05-25 PROCEDURE — 3074F SYST BP LT 130 MM HG: CPT | Mod: CPTII,S$GLB,, | Performed by: INTERNAL MEDICINE

## 2022-05-25 PROCEDURE — 1160F RVW MEDS BY RX/DR IN RCRD: CPT | Mod: CPTII,S$GLB,, | Performed by: INTERNAL MEDICINE

## 2022-05-25 PROCEDURE — 1126F PR PAIN SEVERITY QUANTIFIED, NO PAIN PRESENT: ICD-10-PCS | Mod: CPTII,S$GLB,, | Performed by: INTERNAL MEDICINE

## 2022-05-25 PROCEDURE — 3078F DIAST BP <80 MM HG: CPT | Mod: CPTII,S$GLB,, | Performed by: INTERNAL MEDICINE

## 2022-05-25 PROCEDURE — 3288F FALL RISK ASSESSMENT DOCD: CPT | Mod: CPTII,S$GLB,, | Performed by: INTERNAL MEDICINE

## 2022-05-25 PROCEDURE — 1159F PR MEDICATION LIST DOCUMENTED IN MEDICAL RECORD: ICD-10-PCS | Mod: CPTII,S$GLB,, | Performed by: INTERNAL MEDICINE

## 2022-05-25 PROCEDURE — 1101F PT FALLS ASSESS-DOCD LE1/YR: CPT | Mod: CPTII,S$GLB,, | Performed by: INTERNAL MEDICINE

## 2022-05-25 PROCEDURE — 99397 PR PREVENTIVE VISIT,EST,65 & OVER: ICD-10-PCS | Mod: S$GLB,,, | Performed by: INTERNAL MEDICINE

## 2022-05-25 PROCEDURE — 1159F MED LIST DOCD IN RCRD: CPT | Mod: CPTII,S$GLB,, | Performed by: INTERNAL MEDICINE

## 2022-05-25 PROCEDURE — 1126F AMNT PAIN NOTED NONE PRSNT: CPT | Mod: CPTII,S$GLB,, | Performed by: INTERNAL MEDICINE

## 2022-05-25 PROCEDURE — 99397 PER PM REEVAL EST PAT 65+ YR: CPT | Mod: S$GLB,,, | Performed by: INTERNAL MEDICINE

## 2022-05-25 PROCEDURE — 3008F PR BODY MASS INDEX (BMI) DOCUMENTED: ICD-10-PCS | Mod: CPTII,S$GLB,, | Performed by: INTERNAL MEDICINE

## 2022-05-25 PROCEDURE — 99999 PR PBB SHADOW E&M-EST. PATIENT-LVL III: CPT | Mod: PBBFAC,,, | Performed by: INTERNAL MEDICINE

## 2022-05-25 PROCEDURE — 3078F PR MOST RECENT DIASTOLIC BLOOD PRESSURE < 80 MM HG: ICD-10-PCS | Mod: CPTII,S$GLB,, | Performed by: INTERNAL MEDICINE

## 2022-05-25 PROCEDURE — 99999 PR PBB SHADOW E&M-EST. PATIENT-LVL III: ICD-10-PCS | Mod: PBBFAC,,, | Performed by: INTERNAL MEDICINE

## 2022-05-25 NOTE — TELEPHONE ENCOUNTER
Faxed Ortho referral to fax# [phone]/office# [phone] to Dr. Phuc Obregon Jr, MD. I  Have attached a confirmation sheet to this fax located in my file cabinet.

## 2022-05-25 NOTE — PROGRESS NOTES
Subjective:       Patient ID: Lucas Mayer is a 67 y.o. female.    Chief Complaint: Annual Exam    Here for annual exam    66 yo F with PMHx of Moderate MORALES, Severe REM related MORALES, severe snoring (rec ENT eval) and PLMS without arousals    This questions about COVID booster.    ### Vertigo ###  She was placed on low dose valium 2mg BID with an 80% reduction in her symptoms. Symptoms remain well controlled. She will still get dizzy when lying flat or driving over bridges. She must walk on edge of hallways to minimize trigger     ### Bilateral knee pain ###  -frequent cracking and occasional pain. Not debilitating. Overall stable. 75lb dog hit her right knee and it has been improved since. Now her left leg is more bothersome.     5/25/22 worsened. Ready for ortho.      ### HTN ###  Amlodipine 5mg  Denies frequent or current HA, intermittent blurry vision, dizziness, CP, or SOB.  [facility]      ### MORALES ###  Moderate MORALES, Severe REM related MORALES, severe snoring (rec ENT eval) and PLMS without arousals  CPAP titration scheduled for today 12/16/19  Tolerating CPAP from [facility]           Review of Systems   Constitutional: Negative for chills, fatigue, fever and unexpected weight change.   HENT: Negative for ear pain, hearing loss, postnasal drip, tinnitus, trouble swallowing and voice change.    Respiratory: Negative for cough, chest tightness, shortness of breath and wheezing.    Cardiovascular: Negative for chest pain, palpitations and leg swelling.   Gastrointestinal: Negative for abdominal pain, blood in stool, diarrhea, nausea and vomiting.   Endocrine: Negative for polydipsia, polyphagia and polyuria.   Genitourinary: Negative for difficulty urinating, dysuria, hematuria and vaginal bleeding.   Skin: Negative for rash.   Allergic/Immunologic: Negative for food allergies.   Neurological: Negative for dizziness, numbness and headaches.   Hematological: Does  "not bruise/bleed easily.   Psychiatric/Behavioral: The patient is not nervous/anxious.        Objective:      Vitals:    05/25/22 0912   BP: 122/60   Pulse: 66   SpO2: 98%   Weight: 96.2 kg (212 lb 1.3 oz)   Height: 5' 2" (1.575 m)      Physical Exam  Constitutional:       General: She is not in acute distress.     Appearance: She is well-developed.   HENT:      Head: Normocephalic and atraumatic.      Mouth/Throat:      Pharynx: No oropharyngeal exudate.   Eyes:      General: No scleral icterus.     Conjunctiva/sclera: Conjunctivae normal.      Pupils: Pupils are equal, round, and reactive to light.   Neck:      Thyroid: No thyromegaly.   Cardiovascular:      Rate and Rhythm: Normal rate and regular rhythm.      Heart sounds: Normal heart sounds. No murmur heard.  Pulmonary:      Effort: Pulmonary effort is normal.      Breath sounds: Normal breath sounds. No wheezing or rales.   Abdominal:      General: There is no distension.      Palpations: Abdomen is soft.      Tenderness: There is no abdominal tenderness.   Musculoskeletal:         General: No tenderness.   Lymphadenopathy:      Cervical: No cervical adenopathy.   Skin:     General: Skin is warm and dry.   Neurological:      Mental Status: She is alert and oriented to person, place, and time.   Psychiatric:         Behavior: Behavior normal.         Assessment:       1. Annual physical exam    2. Chronic pain of both knees    3. Hypercalcemia    4. Benign hypertension    5. CN IV nerve palsy, right eye    6. Pure hypercholesterolemia    7. Bilateral chronic knee pain    8. Vertigo        Plan:       Lucas was seen today for annual exam.    Diagnoses and all orders for this visit:    Annual physical exam   Annual labs done prior to appointment. We reviewed labs together. We discussed routine age appropriate cancer screening guidelines along with vaccination schedules and their risk and benefits. A complete ROS was performed and patient had an opportunity to " ask questions and be provided answers to the best of my knowledge.     Chronic pain of both knees  -     Ambulatory referral/consult to Orthopedics; Future    Hypercalcemia  Mild and pt taking calcium. Will repeat and monitor.  -     Comprehensive Metabolic Panel; Future  -     Calcium, Ionized; Future    Benign hypertension   Controlled and asymptomatic.  Continue current Rx regimen.    CN IV nerve palsy, right eye    Pure hypercholesterolemia   Tolerating statin. Continue this.     Bilateral chronic knee pain    Vertigo   Pt is aware of acute and chronic SE potential and pt has good insight and appreciation for these chemicals. [place]  reviewed and is appropriate. controlled. Continue current regimen           Jean Carlos Strickland MD  Internal Medicine-[facility]        Side effects of medication(s) were discussed in detail and patient voiced understanding.  Patient will call back for any issues or complications.

## 2022-06-06 ENCOUNTER — PES CALL (OUTPATIENT)
Dept: ADMINISTRATIVE | Facility: CLINIC | Age: 67
End: 2022-06-06
Payer: MEDICARE

## 2022-06-10 ENCOUNTER — PATIENT MESSAGE (OUTPATIENT)
Dept: INTERNAL MEDICINE | Facility: CLINIC | Age: 67
End: 2022-06-10
Payer: MEDICARE

## 2022-06-10 DIAGNOSIS — F32.A DEPRESSION, UNSPECIFIED DEPRESSION TYPE: ICD-10-CM

## 2022-06-10 RX ORDER — DIAZEPAM 2 MG/1
2 TABLET ORAL EVERY 8 HOURS PRN
Qty: 60 TABLET | Refills: 2 | Status: SHIPPED | OUTPATIENT
Start: 2022-06-10 | End: 2022-09-20 | Stop reason: SDUPTHER

## 2022-06-10 NOTE — TELEPHONE ENCOUNTER
Continue to rest and stretch and heat   
Please advise if pt should do anything further  
Detail Level: Simple
Detail Level: Zone

## 2022-06-10 NOTE — TELEPHONE ENCOUNTER
Unable to retrieve patient chart and identify care gaps.  [facility] Embedded Care Gaps. Reference number: [id number]. 6/10/2022   10:33:12 AM CDT

## 2022-06-13 ENCOUNTER — PATIENT MESSAGE (OUTPATIENT)
Dept: UROGYNECOLOGY | Facility: CLINIC | Age: 67
End: 2022-06-13
Payer: MEDICARE

## 2022-06-15 ENCOUNTER — OFFICE VISIT (OUTPATIENT)
Dept: UROGYNECOLOGY | Facility: CLINIC | Age: 67
End: 2022-06-15
Payer: MEDICARE

## 2022-06-15 VITALS
DIASTOLIC BLOOD PRESSURE: 64 MMHG | HEIGHT: 62 IN | HEART RATE: 62 BPM | BODY MASS INDEX: 38.94 KG/M2 | SYSTOLIC BLOOD PRESSURE: 135 MMHG | WEIGHT: 211.63 LBS

## 2022-06-15 DIAGNOSIS — R15.1 FECAL SMEARING: ICD-10-CM

## 2022-06-15 DIAGNOSIS — R39.15 URINARY URGENCY: ICD-10-CM

## 2022-06-15 DIAGNOSIS — N39.46 MIXED INCONTINENCE: Primary | ICD-10-CM

## 2022-06-15 DIAGNOSIS — L90.0 LICHEN SCLEROSUS: ICD-10-CM

## 2022-06-15 DIAGNOSIS — R15.9 FULL INCONTINENCE OF FECES: ICD-10-CM

## 2022-06-15 PROCEDURE — 99999 PR PBB SHADOW E&M-EST. PATIENT-LVL IV: ICD-10-PCS | Mod: PBBFAC,,, | Performed by: OBSTETRICS & GYNECOLOGY

## 2022-06-15 PROCEDURE — 3008F PR BODY MASS INDEX (BMI) DOCUMENTED: ICD-10-PCS | Mod: CPTII,S$GLB,, | Performed by: OBSTETRICS & GYNECOLOGY

## 2022-06-15 PROCEDURE — 87077 CULTURE AEROBIC IDENTIFY: CPT | Performed by: OBSTETRICS & GYNECOLOGY

## 2022-06-15 PROCEDURE — 3288F PR FALLS RISK ASSESSMENT DOCUMENTED: ICD-10-PCS | Mod: CPTII,S$GLB,, | Performed by: OBSTETRICS & GYNECOLOGY

## 2022-06-15 PROCEDURE — 87088 URINE BACTERIA CULTURE: CPT | Performed by: OBSTETRICS & GYNECOLOGY

## 2022-06-15 PROCEDURE — 87086 URINE CULTURE/COLONY COUNT: CPT | Performed by: OBSTETRICS & GYNECOLOGY

## 2022-06-15 PROCEDURE — 3288F FALL RISK ASSESSMENT DOCD: CPT | Mod: CPTII,S$GLB,, | Performed by: OBSTETRICS & GYNECOLOGY

## 2022-06-15 PROCEDURE — 3075F PR MOST RECENT SYSTOLIC BLOOD PRESS GE 130-139MM HG: ICD-10-PCS | Mod: CPTII,S$GLB,, | Performed by: OBSTETRICS & GYNECOLOGY

## 2022-06-15 PROCEDURE — 3075F SYST BP GE 130 - 139MM HG: CPT | Mod: CPTII,S$GLB,, | Performed by: OBSTETRICS & GYNECOLOGY

## 2022-06-15 PROCEDURE — 1101F PR PT FALLS ASSESS DOC 0-1 FALLS W/OUT INJ PAST YR: ICD-10-PCS | Mod: CPTII,S$GLB,, | Performed by: OBSTETRICS & GYNECOLOGY

## 2022-06-15 PROCEDURE — 3008F BODY MASS INDEX DOCD: CPT | Mod: CPTII,S$GLB,, | Performed by: OBSTETRICS & GYNECOLOGY

## 2022-06-15 PROCEDURE — 99999 PR PBB SHADOW E&M-EST. PATIENT-LVL IV: CPT | Mod: PBBFAC,,, | Performed by: OBSTETRICS & GYNECOLOGY

## 2022-06-15 PROCEDURE — 3078F PR MOST RECENT DIASTOLIC BLOOD PRESSURE < 80 MM HG: ICD-10-PCS | Mod: CPTII,S$GLB,, | Performed by: OBSTETRICS & GYNECOLOGY

## 2022-06-15 PROCEDURE — 99204 OFFICE O/P NEW MOD 45 MIN: CPT | Mod: S$GLB,,, | Performed by: OBSTETRICS & GYNECOLOGY

## 2022-06-15 PROCEDURE — 87186 SC STD MICRODIL/AGAR DIL: CPT | Performed by: OBSTETRICS & GYNECOLOGY

## 2022-06-15 PROCEDURE — 1159F MED LIST DOCD IN RCRD: CPT | Mod: CPTII,S$GLB,, | Performed by: OBSTETRICS & GYNECOLOGY

## 2022-06-15 PROCEDURE — 1159F PR MEDICATION LIST DOCUMENTED IN MEDICAL RECORD: ICD-10-PCS | Mod: CPTII,S$GLB,, | Performed by: OBSTETRICS & GYNECOLOGY

## 2022-06-15 PROCEDURE — 1101F PT FALLS ASSESS-DOCD LE1/YR: CPT | Mod: CPTII,S$GLB,, | Performed by: OBSTETRICS & GYNECOLOGY

## 2022-06-15 PROCEDURE — 3078F DIAST BP <80 MM HG: CPT | Mod: CPTII,S$GLB,, | Performed by: OBSTETRICS & GYNECOLOGY

## 2022-06-15 PROCEDURE — 99204 PR OFFICE/OUTPT VISIT, NEW, LEVL IV, 45-59 MIN: ICD-10-PCS | Mod: S$GLB,,, | Performed by: OBSTETRICS & GYNECOLOGY

## 2022-06-15 RX ORDER — SOLIFENACIN SUCCINATE 5 MG/1
5 TABLET, FILM COATED ORAL DAILY
Qty: 30 TABLET | Refills: 11 | Status: SHIPPED | OUTPATIENT
Start: 2022-06-15 | End: 2022-10-20 | Stop reason: DRUGHIGH

## 2022-06-15 RX ORDER — CONJUGATED ESTROGENS 0.62 MG/G
CREAM VAGINAL
Qty: 45 G | Refills: 12 | Status: SHIPPED | OUTPATIENT
Start: 2022-06-15 | End: 2023-11-06

## 2022-06-15 RX ORDER — CLOBETASOL PROPIONATE 0.5 MG/G
OINTMENT TOPICAL 2 TIMES DAILY
Qty: 60 G | Refills: 1 | Status: SHIPPED | OUTPATIENT
Start: 2022-06-15 | End: 2023-11-06

## 2022-06-15 NOTE — PATIENT INSTRUCTIONS
1.  Mixed urinary incontinence, urge > stress:   --Bladder diary for 3-7 days, write the number of times you go to the rest room and associated symptoms of urgency or leakage.   --Empty bladder every 3 hours.  Empty well: wait a minute, lean forward on toilet.    --Avoid dietary irritants (see sheet).  Keep diary x 3-5 days to determine your irritants.  --KEGELS: do 10 in AM and 10 in PM, holding each x 10 seconds.  When you feel urge to go, STOP, KEGEL, and when urge has passed, then go to bathroom.  Start pelvic floor  PT .    --URGE: start  Vesicare 5 mg nightly.  SE profile reviewed.   Takes 2-4 weeks to see if will have effect.  For dry mouth: get sour, sugar free lozenge or gum.    --STRESS:  Pessary vs. Sling.      2.. Fecal incontinence  --Fiber may better control cholesterol and blood glucose.  Start daily fiber.  Take 1 tsp of fiber powder (psyllium or other sugar-free powder).  Mix in 8 oz of water.  Take x 3-5 days.  Then, increase fiber by 1 tsp every 3-5 days until stool is easy to pass.    --Keep a bowel diary   --Consider Lomotil in the future  --Pelvic floor PT  --Endoanal sonogram to evaluate the sphincter   --Anal manometry   --We discussed treatment option including Pelvic floor PT, SNS, and Sphincteroplasty. Risks, benefits reviewed in detail. For now we will start with pelvic floor PT.   --Consider Colorectal evaluation in the future     3. Lichens Sclerosis   Start vaginal clobetasol twice a day for 10 days with itching  Will biopsy if no improvement      4. Vaginal atrophy (dryness):  Use 1 gram of estrogen cream in vagina nightly x 2 weeks, then twice a week thereafter.

## 2022-06-15 NOTE — PROGRESS NOTES
Subjective:       Patient ID: Lucas Mayer is a 67 y.o. female.    Chief Complaint:  Urinary Incontinence and Bowel Incontinence      History of Present Illness  HPI 67 Y O F  has a past medical history of Cataract, Depression, Hypertension, Other and unspecified hyperlipidemia, Trichiasis, and Vertigo.  Referred by Dr. Samuels  for evaluation of urinary incontinence. This has been an issue since 2000. She feels that the symptoms overall have worsened since her hysterectomy in 2000. She now changes pads 2/3 times per day has both stress and urge incontinence- especially at capacity.         [facility]    Question 6/15/2022 11:48 AM CDT - Filed by Abigail Spears MA   General Urogynecology: Are you experiencing the following?    Dysuria (painful urination) Yes   Nocturia:  waking up at night to empty your bladder  Yes   If you answered yes to the previous question, how many times does this happen per night? 1-2   Enuresis (urine loss during sleep) No   Dribbling urine after you urinate Yes   Hematuria (urine appears red) No   Type of stream Interrupted   Urinary frequency: How often a day are you going to the bathroom per day?  Less than 10   Urinary Tract Infections: How many Urinary Tract Infections have you had in the past year? I have not had a UTI in the past year   If you have had a UTI in the past year, what treatments have you had so far?  Cranberry supplementation    I have not had a UTI in the past year   Urinary Incontinence (General): Are you experiencing the following?    Past consultation for incontinence: Have you ever seen someone for the evaluation of incontinence? Yes   If you answered yes to the previous question, please select all the therapies you have tried.  [facility]    Pelvic floor physical therapy   Please note the effectiveness of the therapies. Somewhat effective   Need to wear protection to keep clothes dry  Yes   If you answered yes to the previous question, please eduardo the  "protection you use.  Panty liner   If you wear protection, how much wetness is typically on each pad? Slight   If you wear protection, how often do you have to change per day, if applicable?  2   Stress Symptoms: Are you experiencing the following?    Leakage of urine with cough, laugh and/or sneeze Yes   If you answered yes to the previous question, what is the frequency in days, weeks and/or months? Daily   Leakage of urine with sex No   Leakage of urine with bending/ lifting Yes   Leakage of urine with briskly walking or jogging Yes   If you lose urine for any other reason not previously mentioned, please note it below, if applicable.  just seeing a toilet will make make me have to go   Urge Symptoms: Are you experiencing the following?    Urgency ("got to go" feeling) Yes   Urge: How frequently do you feel an urge to urinate (feeling like you "gotta go" to the bathroom and can't wait) Daily   Do you experience a leakage of urine when you have a feeling of urgency?  Yes   Leakage of urine when unaware Yes   Past use of anticholinergics (medications used to treat overactive bladder) No   If you answered yes to the previous question, please eduardo the anticholinergics you have used:     Have you ever used Mirbetriq (aka Mirabegron)?  No   Prolapse Symptoms: Are you experiencing any of the following?     Falling out/ Bulging/ Heaviness in the vagina Yes   Vaginal/ Abdominal Pain/ Pressure Yes   Need to strain/ Push to void Yes   Need to wait on the toilet before you void Yes   Unusual position to urinate (using your hands to push back the vaginal bulge) No   Sensation of incomplete emptying Yes   Past use of pessary device No   If you answered yes to the previous question, please list the devices you have used below.     Bowel Symptoms: Are you experiencing any of the following?    Constipation No   Diarrhea  Yes   Hematochezia (bloody stool) No   Incomplete evacuation of stool Yes   Involuntary loss of formed stool " Yes   Fecal smearing/urgency Yes   Involuntary loss of gas Yes   Vaginal Symptoms: Are you experiencing any of the following?     Abnormal vaginal bleeding  No   Vaginal dryness No   Sexually active  No   Dyspareunia (painful intercourse) No   Estrogen use  No       GYN & OB History  No LMP recorded. Patient has had a hysterectomy.   Date of Last Pap: No result found    OB History    Para Term  AB Living   3 1     2 1   SAB IAB Ectopic Multiple Live Births                  # Outcome Date GA Lbr Palmer/2nd Weight Sex Delivery Anes PTL Lv   3 AB            2 AB            1 Para              OB     x 1  C/s x 0   Largest: 7 # 7 oz   Forceps: no  Episiotomy:  unknown  Degree: 3rd or 4th unknown    GYN  Menarche:  11  Menstrual cycle:   Menopause: 47  Hysterectomy:  open: indication: Fibroids  Ovaries: removed   Tubal ligation: NO   Other abdominal surgeries: Lap  Arcelia     Review of Systems  Review of Systems   Constitutional: Negative.  Negative for activity change, appetite change, chills, diaphoresis, fatigue, fever and unexpected weight change.   HENT: Negative.    Eyes: Negative.    Respiratory: Negative.  Negative for apnea, cough and wheezing.    Cardiovascular: Negative.  Negative for chest pain and palpitations.   Gastrointestinal: Negative for abdominal distention, abdominal pain, anal bleeding, blood in stool, constipation, diarrhea, nausea, rectal pain and vomiting.   Endocrine: Negative.    Genitourinary: Positive for frequency and urgency. Negative for decreased urine volume, difficulty urinating, dyspareunia, dysuria, enuresis, flank pain, genital sores, hematuria, menstrual problem, pelvic pain, vaginal bleeding, vaginal discharge and vaginal pain.   Musculoskeletal: Negative for back pain and gait problem.   Skin: Negative for color change, pallor, rash and wound.   Allergic/Immunologic: Negative for immunocompromised state.   Neurological: Negative.  Negative for dizziness and speech  difficulty.   Hematological: Negative for adenopathy.   Psychiatric/Behavioral: Negative for agitation, behavioral problems, confusion and sleep disturbance.           Objective:     Physical Exam   Constitutional: She is oriented to person, place, and time. She appears well-developed.   HENT:   Head: Normocephalic and atraumatic.   Eyes: Conjunctivae and EOM are normal.   Cardiovascular: Normal rate, regular rhythm, S1 normal, S2 normal, normal heart sounds and intact distal pulses.   Pulmonary/Chest: Effort normal and breath sounds normal. She exhibits no tenderness.   Abdominal: Soft. Bowel sounds are normal. She exhibits no distension and no mass. There is no splenomegaly or hepatomegaly. There is no abdominal tenderness. There is no rigidity, no rebound and no guarding. Hernia confirmed negative in the right inguinal area and confirmed negative in the left inguinal area.   Genitourinary: Pelvic exam was performed with patient supine. Rectum normal, vagina normal, skenes normal and bartholins normal. Right labia normal and left labia normal. Urethra exhibits hypermobility. Urethra exhibits no urethral caruncle, no urethral diverticulum and no urethral mass. Right bartholin is not enlarged and not tender. Left bartholin is not enlarged and not tender. Rectal exam shows resting tone normal and active tone normal. Rectal exam shows no external hemorrhoid, no fissure, no tenderness, anal tone normal and no dovetailing. Guaiac negative stool. There is atrophy in the vagina. No foreign body, tenderness, bleeding, unspecified prolapse of vaginal walls, fistula, mesh exposure or lavator tenderness in the vagina. Right adnexum displays no tenderness. Left adnexum displays no tenderness. Uterus is absent.   PVR: 80 ML  Empty cough stress test: Negative.  Kegel: 4/5    POP-Q  Aa: -1 Ba: -1 C: -5   GH: 3 PB: 2 TVL: 8   Ap: -1 Bp: -1 D:                      Musculoskeletal:         General: Normal range of motion.       Cervical back: Normal range of motion and neck supple.   Neurological: She is alert and oriented to person, place, and time. She has normal strength and normal reflexes. Cranial nerves II through XII intact. No cranial nerve deficit.   Skin: Skin is warm and dry.   Psychiatric: She has a normal mood and affect. Her speech is normal and behavior is normal. Judgment normal.       The urethral was cleaned with Betadine swab, a 10 Macedonian catheter was easily passed using sterile technique and the bladder drained. Approximately  80 mL's of clear/cloudy urine was obtained. The patient tolerated the procedure well.      HCM  Pap's: No history of abnormal, none recently   Mammo: BIRADS: 1; Last imaging  2021  Colonoscopy: N/a: normal   Dexa: Normal bone mineral density 2022    Assessment:        1. Mixed incontinence    2. Urinary urgency    3. Fecal smearing    4. Full incontinence of feces    5. Lichen sclerosus           Plan:        1.  Mixed urinary incontinence, urge > stress:   --Bladder diary for 3-7 days, write the number of times you go to the rest room and associated symptoms of urgency or leakage.   --Empty bladder every 3 hours.  Empty well: wait a minute, lean forward on toilet.    --Avoid dietary irritants (see sheet).  Keep diary x 3-5 days to determine your irritants.  --KEGELS: do 10 in AM and 10 in PM, holding each x 10 seconds.  When you feel urge to go, STOP, KEGEL, and when urge has passed, then go to bathroom.  Start pelvic floor  PT (wants to have the PT in [place]) .    --URGE: start vesicare 5 mg nightly.  SE profile reviewed.   Takes 2-4 weeks to see if will have effect.  For dry mouth: get sour, sugar free lozenge or gum.    --STRESS:  Pessary vs. Sling.      2. Fecal incontinence  --Fiber may better control cholesterol and blood glucose.  Start daily fiber.  Take 1 tsp of fiber powder (psyllium or other sugar-free powder).  Mix in 8 oz of water.  Take x 3-5 days.  Then, increase fiber by 1 tsp every  3-5 days until stool is easy to pass.    --Keep a bowel diary   --Consider Lomotil in the future  --Pelvic floor PT  --Endoanal sonogram to evaluate the sphincter   --Anal manometry   --We discussed treatment option including Pelvic floor PT, SNS, and Sphincteroplasty. Risks, benefits reviewed in detail. For now we will start with pelvic floor PT.   --Consider Colorectal evaluation in the future     3. Lichens Sclerosis   Start vaginal clobetasol twice a day for 10 days with itching    4. Vaginal atrophy (dryness):  Use 1 gram of estrogen cream in vagina nightly x 2 weeks, then twice a week thereafter.        Approximately 50 minutes of total time spent in consult, 75 % in discussion. This includes face to face time and non-face to face time preparing to see the patient, obtaining and/or reviewing separately obtained history, documenting clinical information in the electronic or other health record, independently interpreting results (not separately reported) and communicating results to the patient/family/caregiver, or care coordination (not separately reported).     Thank you for requesting consultation of your patient.  I look forward to participating in her care.    Isabel Sweet DO  Female Pelvic Medicine and Reconstructive Surgery  [facility] [place], [place]

## 2022-06-17 ENCOUNTER — PATIENT MESSAGE (OUTPATIENT)
Dept: UROGYNECOLOGY | Facility: CLINIC | Age: 67
End: 2022-06-17
Payer: MEDICARE

## 2022-06-17 DIAGNOSIS — N30.00 ACUTE CYSTITIS WITHOUT HEMATURIA: Primary | ICD-10-CM

## 2022-06-17 LAB — BACTERIA UR CULT: ABNORMAL

## 2022-06-17 RX ORDER — CIPROFLOXACIN 500 MG/1
500 TABLET ORAL 2 TIMES DAILY
Qty: 10 TABLET | Refills: 0 | Status: SHIPPED | OUTPATIENT
Start: 2022-06-17 | End: 2022-06-22

## 2022-07-26 ENCOUNTER — PATIENT MESSAGE (OUTPATIENT)
Dept: UROGYNECOLOGY | Facility: CLINIC | Age: 67
End: 2022-07-26
Payer: MEDICARE

## 2022-07-26 ENCOUNTER — PATIENT MESSAGE (OUTPATIENT)
Dept: DERMATOLOGY | Facility: CLINIC | Age: 67
End: 2022-07-26
Payer: MEDICARE

## 2022-07-26 DIAGNOSIS — R15.9 FULL INCONTINENCE OF FECES: Primary | ICD-10-CM

## 2022-07-26 DIAGNOSIS — L90.0 LICHEN SCLEROSUS: ICD-10-CM

## 2022-07-26 DIAGNOSIS — R15.1 FECAL SMEARING: ICD-10-CM

## 2022-07-27 DIAGNOSIS — N39.46 MIXED INCONTINENCE: Primary | ICD-10-CM

## 2022-07-27 DIAGNOSIS — R39.15 URINARY URGENCY: ICD-10-CM

## 2022-07-27 DIAGNOSIS — R15.9 FULL INCONTINENCE OF FECES: ICD-10-CM

## 2022-08-02 ENCOUNTER — PATIENT MESSAGE (OUTPATIENT)
Dept: UROGYNECOLOGY | Facility: CLINIC | Age: 67
End: 2022-08-02
Payer: MEDICARE

## 2022-08-09 ENCOUNTER — OFFICE VISIT (OUTPATIENT)
Dept: SURGERY | Facility: CLINIC | Age: 67
End: 2022-08-09
Payer: MEDICARE

## 2022-08-09 VITALS
HEIGHT: 62 IN | SYSTOLIC BLOOD PRESSURE: 151 MMHG | HEART RATE: 77 BPM | BODY MASS INDEX: 38.99 KG/M2 | DIASTOLIC BLOOD PRESSURE: 68 MMHG | WEIGHT: 211.88 LBS

## 2022-08-09 DIAGNOSIS — Z98.890 H/O COLONOSCOPY WITH POLYPECTOMY: ICD-10-CM

## 2022-08-09 DIAGNOSIS — R15.1 FECAL SMEARING: ICD-10-CM

## 2022-08-09 DIAGNOSIS — R13.10 DYSPHAGIA, UNSPECIFIED TYPE: Primary | ICD-10-CM

## 2022-08-09 DIAGNOSIS — R15.9 FULL INCONTINENCE OF FECES: ICD-10-CM

## 2022-08-09 DIAGNOSIS — Z86.010 H/O COLONOSCOPY WITH POLYPECTOMY: ICD-10-CM

## 2022-08-09 DIAGNOSIS — L90.0 LICHEN SCLEROSUS: ICD-10-CM

## 2022-08-09 PROCEDURE — 1160F RVW MEDS BY RX/DR IN RCRD: CPT | Mod: CPTII,S$GLB,, | Performed by: NURSE PRACTITIONER

## 2022-08-09 PROCEDURE — 3077F SYST BP >= 140 MM HG: CPT | Mod: CPTII,S$GLB,, | Performed by: NURSE PRACTITIONER

## 2022-08-09 PROCEDURE — 3078F DIAST BP <80 MM HG: CPT | Mod: CPTII,S$GLB,, | Performed by: NURSE PRACTITIONER

## 2022-08-09 PROCEDURE — 1100F PR PT FALLS ASSESS DOC 2+ FALLS/FALL W/INJURY/YR: ICD-10-PCS | Mod: CPTII,S$GLB,, | Performed by: NURSE PRACTITIONER

## 2022-08-09 PROCEDURE — 1159F MED LIST DOCD IN RCRD: CPT | Mod: CPTII,S$GLB,, | Performed by: NURSE PRACTITIONER

## 2022-08-09 PROCEDURE — 99204 OFFICE O/P NEW MOD 45 MIN: CPT | Mod: S$GLB,,, | Performed by: NURSE PRACTITIONER

## 2022-08-09 PROCEDURE — 1126F PR PAIN SEVERITY QUANTIFIED, NO PAIN PRESENT: ICD-10-PCS | Mod: CPTII,S$GLB,, | Performed by: NURSE PRACTITIONER

## 2022-08-09 PROCEDURE — 3288F PR FALLS RISK ASSESSMENT DOCUMENTED: ICD-10-PCS | Mod: CPTII,S$GLB,, | Performed by: NURSE PRACTITIONER

## 2022-08-09 PROCEDURE — 1126F AMNT PAIN NOTED NONE PRSNT: CPT | Mod: CPTII,S$GLB,, | Performed by: NURSE PRACTITIONER

## 2022-08-09 PROCEDURE — 3008F PR BODY MASS INDEX (BMI) DOCUMENTED: ICD-10-PCS | Mod: CPTII,S$GLB,, | Performed by: NURSE PRACTITIONER

## 2022-08-09 PROCEDURE — 99204 PR OFFICE/OUTPT VISIT, NEW, LEVL IV, 45-59 MIN: ICD-10-PCS | Mod: S$GLB,,, | Performed by: NURSE PRACTITIONER

## 2022-08-09 PROCEDURE — 1100F PTFALLS ASSESS-DOCD GE2>/YR: CPT | Mod: CPTII,S$GLB,, | Performed by: NURSE PRACTITIONER

## 2022-08-09 PROCEDURE — 3288F FALL RISK ASSESSMENT DOCD: CPT | Mod: CPTII,S$GLB,, | Performed by: NURSE PRACTITIONER

## 2022-08-09 PROCEDURE — 1160F PR REVIEW ALL MEDS BY PRESCRIBER/CLIN PHARMACIST DOCUMENTED: ICD-10-PCS | Mod: CPTII,S$GLB,, | Performed by: NURSE PRACTITIONER

## 2022-08-09 PROCEDURE — 3008F BODY MASS INDEX DOCD: CPT | Mod: CPTII,S$GLB,, | Performed by: NURSE PRACTITIONER

## 2022-08-09 PROCEDURE — 99999 PR PBB SHADOW E&M-EST. PATIENT-LVL V: CPT | Mod: PBBFAC,,, | Performed by: NURSE PRACTITIONER

## 2022-08-09 PROCEDURE — 3077F PR MOST RECENT SYSTOLIC BLOOD PRESSURE >= 140 MM HG: ICD-10-PCS | Mod: CPTII,S$GLB,, | Performed by: NURSE PRACTITIONER

## 2022-08-09 PROCEDURE — 99999 PR PBB SHADOW E&M-EST. PATIENT-LVL V: ICD-10-PCS | Mod: PBBFAC,,, | Performed by: NURSE PRACTITIONER

## 2022-08-09 PROCEDURE — 3078F PR MOST RECENT DIASTOLIC BLOOD PRESSURE < 80 MM HG: ICD-10-PCS | Mod: CPTII,S$GLB,, | Performed by: NURSE PRACTITIONER

## 2022-08-09 PROCEDURE — 1159F PR MEDICATION LIST DOCUMENTED IN MEDICAL RECORD: ICD-10-PCS | Mod: CPTII,S$GLB,, | Performed by: NURSE PRACTITIONER

## 2022-08-09 NOTE — PROGRESS NOTES
CRS Office Visit History and Physical    Referring Md:   Crescencio Lee Pa-c  [address] [place] [address]    SUBJECTIVE:     Chief Complaint: perineal itching    History of Present Illness:  The patient is new patient to this practice.   Course is as follows:  Patient is a 67 y.o. female presents with perineal itching.  For the last few months zoey had severe itching in the perineal area.   Saw Urogyn, prescribed a cream which helped immediately. When she stops using it, the itching and burning returned but not as bad.   It has improved but not resolved recently. Rated on a scale of 1-2/10.  Also since May 2022, she can have post prandial fecal urgency and fecal incontinence. Incontinence has only happened 1-2x per month.   Stool is loose during this but stool is not always loose. She states this has only happened once in the past prior to may 2022.   Still has her gallbladder. She also reports oropharyngeal dysphagia. No heartburn, n/v. Has never had an EGD.   Started Fiberwell fiber gummies and align pre and probiotic.   She reports a vaginal birth 42 years ago, she did have an epiosotomy  Previous anorectal procedures: no   Does not notice blood in the stool    Of note also reports an intermittent LLQ abdominal pain relieved by rubbing her abdomen    Last Colonoscopy: 2015   - inadequate prep w 6mm polypectomy.   - Recommended repeat in 1 yr.   - Did not have repeat colonoscopy.   - Has done a cologuard that was neg since then  Family history of colorectal cancer or IBD: no    Review of patient's allergies indicates:   Allergen Reactions    Ancef [cefazolin] Anaphylaxis    Codeine     Pcn [penicillins] Rash       Past Medical History:   Diagnosis Date    Cataract     Depression     Hypertension     Other and unspecified hyperlipidemia     Trichiasis     Vertigo      Past Surgical History:   Procedure Laterality Date    CATARACT EXTRACTION W/  INTRAOCULAR LENS IMPLANT Left 12/03/2018  "   Dr Foreman     CATARACT EXTRACTION W/  INTRAOCULAR LENS IMPLANT Left 12/3/2018    Procedure: EXTRACTION, CATARACT, WITH IOL INSERTION;  Surgeon: Emilie Foreman MD;  Location: [facility] OR;  Service: Ophthalmology;  Laterality: Left;    CATARACT EXTRACTION W/  INTRAOCULAR LENS IMPLANT Right 2/4/2019    Procedure: EXTRACTION, CATARACT, WITH IOL INSERTION;  Surgeon: Emilie Foreman MD;  Location: [facility] OR;  Service: Ophthalmology;  Laterality: Right;    HYSTERECTOMY  8-2001    fibroids- BK/BSO    OOPHORECTOMY      TONSILLECTOMY      WISDOM TOOTH EXTRACTION       Family History   Problem Relation Age of Onset    Diabetes Mother     Stroke Mother     Essential Tremor Mother     Melanoma Father     Hypertension Father     Essential Tremor Sister     Essential Tremor Sister     Breast cancer Neg Hx     Cancer Neg Hx     Eclampsia Neg Hx      Social History     Tobacco Use    Smoking status: Never Smoker    Smokeless tobacco: Never Used   Substance Use Topics    Alcohol use: Yes     Alcohol/week: 0.8 standard drinks     Types: 1 Standard drinks or equivalent per week     Comment: Social    Drug use: No        Review of Systems:  Review of Systems   Gastrointestinal: Negative for abdominal pain.       OBJECTIVE:     Vital Signs (Most Recent)  BP (!) 151/68 (BP Location: Left arm, Patient Position: Sitting, BP Method: Large (Automatic))   Pulse 77   Ht 5' 2" (1.575 m)   Wt 96.1 kg (211 lb 13.8 oz)   BMI 38.75 kg/m²     Physical Exam:  General: White female in no distress   Neuro: Alert and oriented to person, place, and time.  Moves all extremities.     HEENT: No icterus.  Trachea midline  Abdomen: soft round, no masses noted  Respiratory: Respirations are even and unlabored, no cough or audible wheezing  Skin: Warm dry and intact, No visible rashes, no jaundice    Labs reviewed today:  Lab Results   Component Value Date    WBC 6.36 05/23/2022    HGB 13.8 05/23/2022    HCT 41.4 05/23/2022     " 05/23/2022    CHOL 186 05/23/2022    TRIG 145 05/23/2022    HDL 50 05/23/2022    ALT 68 (H) 05/23/2022    AST 33 05/23/2022     05/23/2022    K 4.4 05/23/2022     05/23/2022    CREATININE 0.8 05/23/2022    BUN 13 05/23/2022    CO2 27 05/23/2022    TSH 3.222 05/23/2022    HGBA1C 5.7 01/15/2021       Imaging reviewed today:  none    Endoscopy reviewed today:  Colonoscopy 2015   - Preparation of the colon was inadequate.                         - One 6 mm polyp in the descending colon. Resected                         and retrieved.                         - Diverticulosis in the sigmoid colon and in the                         descending colon.                         - Stool in the ascending colon.                         - The examination was otherwise normal on direct                         and retroflexion views    Anorectal Exam:    Anal Skin: Normal, some fecal matter around anus    Digital Rectal Exam:  Resting Tone normal  Squeeze low with accessory muscle use  Relaxation with bear down present  Mass none  Rectocele  absent  Tenderness  absent        ASSESSMENT/PLAN:     Diagnoses and all orders for this visit:    Dysphagia, unspecified type  -     Ambulatory referral/consult to Gastroenterology; Future    Full incontinence of feces  -     Ambulatory referral/consult to Colorectal Surgery    Fecal smearing  -     Ambulatory referral/consult to Colorectal Surgery  -     Case Request Endoscopy: COLONOSCOPY    Lichen sclerosus  -     Ambulatory referral/consult to Colorectal Surgery    H/O colonoscopy with polypectomy  -     Case Request Endoscopy: COLONOSCOPY      67 y.o. F here for perineal itching and fecal incontinence. The itching has improved with proper use of steroid cream.   The incontinence is a newer onset. TARIK did show good muscle tone    The patient was instructed to:  Pelvic floor PT  Increase water intake to at least 8-10 glasses of water per day.  Take a daily fiber supplement  (Konsyl, Benefiber, Metamucil) and increase dietary intake to 20-30 grams/day.  Use calmoseptine to perineal area instead of steroid cream  Colonoscopy necessary due to personal hx of colon polyp. We discussed cologuard not appropriate screening tool.   Since she is having dysphagia may need to see Gi before her colonoscopy to see if they would like to order EGD    Follow-up in clinic ANIKET Christina, GOKULP-C  Colon and Rectal Surgery

## 2022-08-15 DIAGNOSIS — F32.A DEPRESSION, UNSPECIFIED DEPRESSION TYPE: ICD-10-CM

## 2022-08-15 RX ORDER — CITALOPRAM 20 MG/1
TABLET, FILM COATED ORAL
Qty: 90 TABLET | Refills: 3 | Status: SHIPPED | OUTPATIENT
Start: 2022-08-15 | End: 2023-09-25 | Stop reason: SDUPTHER

## 2022-08-15 NOTE — TELEPHONE ENCOUNTER
No new care gaps identified.  [facility] Embedded Care Gaps. Reference number: [id number]. 8/15/2022   4:56:30 AM CDT

## 2022-08-15 NOTE — TELEPHONE ENCOUNTER
Refill Decision Note   Lucas Moreno  is requesting a refill authorization.  Brief Assessment and Rationale for Refill:  Approve     Medication Therapy Plan:       Medication Reconciliation Completed: No   Comments:     No Care Gaps recommended.     Note composed:6:34 PM 08/15/2022

## 2022-08-18 ENCOUNTER — PATIENT MESSAGE (OUTPATIENT)
Dept: INTERNAL MEDICINE | Facility: CLINIC | Age: 67
End: 2022-08-18
Payer: MEDICARE

## 2022-08-18 DIAGNOSIS — M25.561 BILATERAL CHRONIC KNEE PAIN: ICD-10-CM

## 2022-08-18 DIAGNOSIS — M25.562 BILATERAL CHRONIC KNEE PAIN: ICD-10-CM

## 2022-08-18 DIAGNOSIS — G89.29 BILATERAL CHRONIC KNEE PAIN: ICD-10-CM

## 2022-08-18 DIAGNOSIS — Z12.11 SCREEN FOR COLON CANCER: Primary | ICD-10-CM

## 2022-08-20 ENCOUNTER — PATIENT MESSAGE (OUTPATIENT)
Dept: SURGERY | Facility: CLINIC | Age: 67
End: 2022-08-20
Payer: MEDICARE

## 2022-08-24 ENCOUNTER — OFFICE VISIT (OUTPATIENT)
Dept: GASTROENTEROLOGY | Facility: CLINIC | Age: 67
End: 2022-08-24
Payer: MEDICARE

## 2022-08-24 VITALS
DIASTOLIC BLOOD PRESSURE: 84 MMHG | HEART RATE: 61 BPM | OXYGEN SATURATION: 97 % | HEIGHT: 62 IN | BODY MASS INDEX: 38.93 KG/M2 | SYSTOLIC BLOOD PRESSURE: 140 MMHG | RESPIRATION RATE: 17 BRPM | WEIGHT: 211.56 LBS

## 2022-08-24 DIAGNOSIS — R15.9 INCONTINENCE OF FECES, UNSPECIFIED FECAL INCONTINENCE TYPE: Primary | ICD-10-CM

## 2022-08-24 DIAGNOSIS — K76.0 FATTY LIVER: ICD-10-CM

## 2022-08-24 DIAGNOSIS — R13.10 DYSPHAGIA, UNSPECIFIED TYPE: ICD-10-CM

## 2022-08-24 DIAGNOSIS — E66.9 OBESITY (BMI 35.0-39.9 WITHOUT COMORBIDITY): ICD-10-CM

## 2022-08-24 DIAGNOSIS — E65 OBESE ABDOMEN: ICD-10-CM

## 2022-08-24 PROCEDURE — 1100F PTFALLS ASSESS-DOCD GE2>/YR: CPT | Mod: CPTII,S$GLB,, | Performed by: INTERNAL MEDICINE

## 2022-08-24 PROCEDURE — 1159F PR MEDICATION LIST DOCUMENTED IN MEDICAL RECORD: ICD-10-PCS | Mod: CPTII,S$GLB,, | Performed by: INTERNAL MEDICINE

## 2022-08-24 PROCEDURE — 1160F PR REVIEW ALL MEDS BY PRESCRIBER/CLIN PHARMACIST DOCUMENTED: ICD-10-PCS | Mod: CPTII,S$GLB,, | Performed by: INTERNAL MEDICINE

## 2022-08-24 PROCEDURE — 3008F PR BODY MASS INDEX (BMI) DOCUMENTED: ICD-10-PCS | Mod: CPTII,S$GLB,, | Performed by: INTERNAL MEDICINE

## 2022-08-24 PROCEDURE — 3288F FALL RISK ASSESSMENT DOCD: CPT | Mod: CPTII,S$GLB,, | Performed by: INTERNAL MEDICINE

## 2022-08-24 PROCEDURE — 99204 OFFICE O/P NEW MOD 45 MIN: CPT | Mod: S$GLB,,, | Performed by: INTERNAL MEDICINE

## 2022-08-24 PROCEDURE — 99999 PR PBB SHADOW E&M-EST. PATIENT-LVL IV: CPT | Mod: PBBFAC,,, | Performed by: INTERNAL MEDICINE

## 2022-08-24 PROCEDURE — 99204 PR OFFICE/OUTPT VISIT, NEW, LEVL IV, 45-59 MIN: ICD-10-PCS | Mod: S$GLB,,, | Performed by: INTERNAL MEDICINE

## 2022-08-24 PROCEDURE — 99999 PR PBB SHADOW E&M-EST. PATIENT-LVL IV: ICD-10-PCS | Mod: PBBFAC,,, | Performed by: INTERNAL MEDICINE

## 2022-08-24 PROCEDURE — 1126F AMNT PAIN NOTED NONE PRSNT: CPT | Mod: CPTII,S$GLB,, | Performed by: INTERNAL MEDICINE

## 2022-08-24 PROCEDURE — 3008F BODY MASS INDEX DOCD: CPT | Mod: CPTII,S$GLB,, | Performed by: INTERNAL MEDICINE

## 2022-08-24 PROCEDURE — 1160F RVW MEDS BY RX/DR IN RCRD: CPT | Mod: CPTII,S$GLB,, | Performed by: INTERNAL MEDICINE

## 2022-08-24 PROCEDURE — 3077F SYST BP >= 140 MM HG: CPT | Mod: CPTII,S$GLB,, | Performed by: INTERNAL MEDICINE

## 2022-08-24 PROCEDURE — 3079F DIAST BP 80-89 MM HG: CPT | Mod: CPTII,S$GLB,, | Performed by: INTERNAL MEDICINE

## 2022-08-24 PROCEDURE — 3079F PR MOST RECENT DIASTOLIC BLOOD PRESSURE 80-89 MM HG: ICD-10-PCS | Mod: CPTII,S$GLB,, | Performed by: INTERNAL MEDICINE

## 2022-08-24 PROCEDURE — 3288F PR FALLS RISK ASSESSMENT DOCUMENTED: ICD-10-PCS | Mod: CPTII,S$GLB,, | Performed by: INTERNAL MEDICINE

## 2022-08-24 PROCEDURE — 1159F MED LIST DOCD IN RCRD: CPT | Mod: CPTII,S$GLB,, | Performed by: INTERNAL MEDICINE

## 2022-08-24 PROCEDURE — 1100F PR PT FALLS ASSESS DOC 2+ FALLS/FALL W/INJURY/YR: ICD-10-PCS | Mod: CPTII,S$GLB,, | Performed by: INTERNAL MEDICINE

## 2022-08-24 PROCEDURE — 1126F PR PAIN SEVERITY QUANTIFIED, NO PAIN PRESENT: ICD-10-PCS | Mod: CPTII,S$GLB,, | Performed by: INTERNAL MEDICINE

## 2022-08-24 PROCEDURE — 3077F PR MOST RECENT SYSTOLIC BLOOD PRESSURE >= 140 MM HG: ICD-10-PCS | Mod: CPTII,S$GLB,, | Performed by: INTERNAL MEDICINE

## 2022-08-24 NOTE — PROGRESS NOTES
Subjective:       Patient ID: Lucas Mayer is a 67 y.o. female.    Chief Complaint: Dysphagia, Diarrhea, Constipation, and Encopresis              Office Visit    8/9/2022  [facility]    Jenniffer Christina NP    Colon and Rectal Surgery Dysphagia, unspecified type +4 more    Dx Referred by Crescencio Lee PA-C    Reason for Visit      Progress Notes  Jenniffer Christina NP (Nurse Practitioner)   Colon and Rectal Surgery  CRS Office Visit History and Physical     Referring Md:   Crescencio Lee Pa-c  [address] [place] [address]       SUBJECTIVE:     Chief Complaint: perineal itching     History of Present Illness:  The patient is new patient to this practice.   Course is as follows:  Patient is a 67 y.o. female presents with perineal itching.  For the last few months zoey had severe itching in the perineal area.   Saw Urogyn, prescribed a cream which helped immediately. When she stops using it, the itching and burning returned but not as bad.   It has improved but not resolved recently. Rated on a scale of 1-2/10.  Also since May 2022, she can have post prandial fecal urgency and fecal incontinence. Incontinence has only happened 1-2x per month.   Stool is loose during this but stool is not always loose. She states this has only happened once in the past prior to may 2022.   Still has her gallbladder. She also reports oropharyngeal dysphagia. No heartburn, n/v. Has never had an EGD.   Started Fiberwell fiber gummies and align pre and probiotic.   She reports a vaginal birth 42 years ago, she did have an epiosotomy  Previous anorectal procedures: no   Does not notice blood in the stool     Of note also reports an intermittent LLQ abdominal pain relieved by rubbing her abdomen     Last Colonoscopy: 2015   - inadequate prep w 6mm polypectomy.   - Recommended repeat in 1 yr.   - Did not have repeat colonoscopy.   - Has done a cologuard that was neg since then  Family  "history of colorectal cancer or IBD: no       Review of patient's allergies indicates:  Allergen Reactions   Ancef (cefazolin) Anaphylaxis   Codeine     Pcn (penicillins) Rash          Past Medical History:  Diagnosis Date   Cataract     Depression     Hypertension     Other and unspecified hyperlipidemia     Trichiasis     Vertigo         Past Surgical History:  Procedure Laterality Date   CATARACT EXTRACTION W/  INTRAOCULAR LENS IMPLANT Left 12/03/2018    Dr Foreman    CATARACT EXTRACTION W/  INTRAOCULAR LENS IMPLANT Left 12/3/2018    Procedure: EXTRACTION, CATARACT, WITH IOL INSERTION;  Surgeon: Emilie Foreman MD;  Location: [facility] OR;  Service: Ophthalmology;  Laterality: Left;   CATARACT EXTRACTION W/  INTRAOCULAR LENS IMPLANT Right 2/4/2019    Procedure: EXTRACTION, CATARACT, WITH IOL INSERTION;  Surgeon: Emilie Foreman MD;  Location: [facility] OR;  Service: Ophthalmology;  Laterality: Right;   HYSTERECTOMY   8-2001    fibroids- BK/BSO   OOPHORECTOMY       TONSILLECTOMY       WISDOM TOOTH EXTRACTION           Family History  Problem Relation Age of Onset   Diabetes Mother     Stroke Mother     Essential Tremor Mother     Melanoma Father     Hypertension Father     Essential Tremor Sister     Essential Tremor Sister     Breast cancer Neg Hx     Cancer Neg Hx     Eclampsia Neg Hx         Social History       Tobacco Use   Smoking status: Never Smoker   Smokeless tobacco: Never Used  Substance Use Topics   Alcohol use: Yes      Alcohol/week: 0.8 standard drinks      Types: 1 Standard drinks or equivalent per week      Comment: Social   Drug use: No        Review of Systems:  Review of Systems   Gastrointestinal: Negative for abdominal pain.           OBJECTIVE:     Vital Signs (Most Recent)  BP (!) 151/68 (BP Location: Left arm, Patient Position: Sitting, BP Method: Large (Automatic))   Pulse 77   Ht 5' 2" (1.575 m)   Wt 96.1 kg (211 lb 13.8 oz)   BMI 38.75 kg/m²      Physical " Exam:  General: White female in no distress   Neuro: Alert and oriented to person, place, and time.  Moves all extremities.     HEENT: No icterus.  Trachea midline  Abdomen: soft round, no masses noted  Respiratory: Respirations are even and unlabored, no cough or audible wheezing  Skin: Warm dry and intact, No visible rashes, no jaundice     Labs reviewed today:    Lab Results  Component Value Date    WBC 6.36 05/23/2022    HGB 13.8 05/23/2022    HCT 41.4 05/23/2022     05/23/2022    CHOL 186 05/23/2022    TRIG 145 05/23/2022    HDL 50 05/23/2022    ALT 68 (H) 05/23/2022    AST 33 05/23/2022     05/23/2022    K 4.4 05/23/2022     05/23/2022    CREATININE 0.8 05/23/2022    BUN 13 05/23/2022    CO2 27 05/23/2022    TSH 3.222 05/23/2022    HGBA1C 5.7 01/15/2021        Imaging reviewed today:  none     Endoscopy reviewed today:  Colonoscopy 2015   - Preparation of the colon was inadequate.                         - One 6 mm polyp in the descending colon. Resected                         and retrieved.                         - Diverticulosis in the sigmoid colon and in the                         descending colon.                         - Stool in the ascending colon.                         - The examination was otherwise normal on direct                         and retroflexion views     Anorectal Exam:     Anal Skin: Normal, some fecal matter around anus     Digital Rectal Exam:  Resting Tone normal  Squeeze low with accessory muscle use  Relaxation with bear down present  Mass none  Rectocele  absent  Tenderness  absent             ASSESSMENT/PLAN:     Diagnoses and all orders for this visit:     Dysphagia, unspecified type  -     Ambulatory referral/consult to Gastroenterology; Future     Full incontinence of feces  -     Ambulatory referral/consult to Colorectal Surgery     Fecal smearing  -     Ambulatory referral/consult to Colorectal Surgery  -     Case Request Endoscopy:  "COLONOSCOPY     Lichen sclerosus  -     Ambulatory referral/consult to Colorectal Surgery     H/O colonoscopy with polypectomy  -     Case Request Endoscopy: COLONOSCOPY        67 y.o. F here for perineal itching and fecal incontinence. The itching has improved with proper use of steroid cream.   The incontinence is a newer onset. TARIK did show good muscle tone     The patient was instructed to:  Pelvic floor PT  Increase water intake to at least 8-10 glasses of water per day.  Take a daily fiber supplement (Konsyl, Benefiber, Metamucil) and increase dietary intake to 20-30 grams/day.  Use calmoseptine to perineal area instead of steroid cream  Colonoscopy necessary due to personal hx of colon polyp. We discussed cologuard not appropriate screening tool.   Since she is having dysphagia may need to see Gi before her colonoscopy to see if they would like to order EGD     Follow-up in clinic ANIKET Christina, GOKULP-C  Colon and Rectal Surgery           Other Notes    All notes            Instructions    Start a fiber supplement daily such as Metamucil or Citrucel, Fibercon is a bigger capsule (consideration for trouble swallowing)     Use Calmoseptine ointment as a skin barrier to perineal area. [employer] has this to purchase over the counter     Colonoscopy order placed. Wait to speak with GI as they will likely want to order and upper scope also. Both can be done at the same time.      Pelvic floor physical therapy. Mention the fecal incontinence as well.         Ledy Boggs.      [facility] in [place], [place]:    (p) [phone].    (f) [phone].         [facility] PT: Telly Jones   [address], [place] [address]   p [phone]   f [phone]           After Visit Summary (Printed 8/9/2022)          Additional Documentation    Vitals: /68 Important   (BP Location: Left arm, Patient Position: Sitting, BP Method: Large (Automatic))  Pulse 77  Ht 5' 2" (1.575 " m)  Wt 96.1 kg (211 lb 13.8 oz)  BMI 38.75 kg/m²  BSA 2.05 m²  Pain Sc 0-No pain  Flowsheets: Anthropometrics    SmartForms:  [facility] AMB - FALL RISK    Encounter Info: Billing Info,  History,  Allergies,  Detailed Report        Communications      AMB Visit Summary: Provider Version sent to Crescencio Lee PA-C                  Not recorded              All Charges for This Encounter    Code Description Service Date Service Provider Modifiers Qty  54334 SD OFFICE/OUTPT VISIT, NEW, LEVL IV, 45-59 MIN 8/9/2022 Jenniffer Christina NP S$GLB 1  142502698 SD PBB SHADOW E&M-EST. PATIENT-LVL V 8/9/2022 Jenniffer Christina NP PBBFAC 1  3078F SD MOST RECENT DIASTOLIC BLOOD PRESSURE < 80 MM HG 8/9/2022 Jenniffer Christina NP S$GLB, CPTII 1  3077F SD MOST RECENT SYSTOLIC BLOOD PRESSURE >= 140 MM HG 8/9/2022 Jenniffer Christina NP S$GLB, CPTII 1  3008F SD BODY MASS INDEX (BMI) DOCUMENTED 8/9/2022 Jenniffer Christina NP S$GLB, CPTII 1  1126F SD PAIN SEVERITY QUANTIFIED, NO PAIN PRESENT 8/9/2022 Jenniffer Christina NP S$GLB, CPTII 1  3288F SD FALLS RISK ASSESSMENT DOCUMENTED 8/9/2022 Jenniffer Christina NP S$GLB, CPTII 1  1100F SD PT FALLS ASSESS DOC 2+ FALLS/FALL W/INJURY/YR 8/9/2022 Jenniffer Christina NP S$GLB, CPTII 1  1159F SD MEDICATION LIST DOCUMENTED IN MEDICAL RECORD 8/9/2022 Jenniffer Christina NP S$GLB, CPTII 1  1160F SD REVIEW ALL MEDS BY PRESCRIBER/CLIN PHARMACIST DOCUMENTED 8/9/2022 Jenniffer Christina NP S$GLB, CPTII 1      Level of Service    Level of Service  SD OFFICE/OUTPT VISIT, NEW, LEVL IV, 45-59 MIN (95453)  Log History  LOS History        BestPractice Advisories    Click to view BestPractice Advisory history      AVS Reports    Date/Time Report Action User  8/9/2022 10:55 AM After Visit Summary Printed Jenniffer Christina NP      Encounter-Level Documents on 08/09/2022:    After Visit Summary - Document on 8/9/2022 10:55 AM by Jenniffer Christina NP: After Visit Summary                  Orders Placed    Ambulatory referral/consult to  Gastroenterology Authorized  Case Request Endoscopy: COLONOSCOPY      Other Orders Performed    Ambulatory referral/consult to Colorectal Surgery Closed      Medication Changes      None    Medication List        Visit Diagnoses      Dysphagia, unspecified type    Full incontinence of feces    Fecal smearing    Lichen sclerosus    H/O colonoscopy with polypectomy    Problem List              She has a history of dysphagia.  She has had dysphagia over the last several years but in the last year her symptoms are more severe.  She describes dysphagia particularly with solids but occasionally with liquids.  She feels as if the food will not go down .  Occasionally she induces vomiting but also she will waited it out .  She means that she tries to relax and consume water and the food slowly will go into her stomach.  She denies aspiration hematemesis hematochezia jaundice or bleeding.  It is of interest her sister has a similar problem.   7 years ago colonoscopy revealed diverticulosis and a small polyp.  She complains of fecal incontinence.  She has altered anal sensation and will pass firm stool in voluntarily.  She also has noted postprandial urgency with semi solid bowel movements.  She does not relate her symptoms to a specific food.  She does not consume milk or milk products.  She takes her current medications.  Her family history is negative for significant colon disease.      Allergies:  Review of patient's allergies indicates:   Allergen Reactions    Ancef [cefazolin] Anaphylaxis    Codeine     Pcn [penicillins] Rash       Medications:    Current Outpatient Medications:     amLODIPine (NORVASC) 5 MG tablet, TAKE 1 TABLET BY MOUTH ONCE DAILY, Disp: 90 tablet, Rfl: 2    atorvastatin (LIPITOR) 40 MG tablet, TAKE 1 TABLET BY MOUTH ONCE DAILY, Disp: 90 tablet, Rfl: 2    Bifidobacterium infantis (ALIGN ORAL), Take by mouth., Disp: , Rfl:     calcium citrate-vitamin D3 315-200 mg (CITRACAL+D) 315 mg-5 mcg  (200 unit) per tablet, Take 1 tablet by mouth 2 (two) times daily., Disp: , Rfl:     citalopram (CELEXA) 20 MG tablet, TAKE 1 TABLET BY MOUTH ONCE DAILY, Disp: 90 tablet, Rfl: 3    clobetasol 0.05% (TEMOVATE) 0.05 % Oint, Apply topically 2 (two) times daily., Disp: 60 g, Rfl: 1    conjugated estrogens (PREMARIN) vaginal cream, 1 g with applicator or dime-sized amt with finger in vagina nightly x 2 weeks, then twice a week thereafter, Disp: 45 g, Rfl: 12    diazePAM (VALIUM) 2 MG tablet, Take 1 tablet (2 mg total) by mouth every 8 (eight) hours as needed for Anxiety., Disp: 60 tablet, Rfl: 2    ketoconazole (NIZORAL) 2 % cream, AAA bid, Disp: 60 g, Rfl: 3    solifenacin (VESICARE) 5 MG tablet, Take 1 tablet (5 mg total) by mouth once daily., Disp: 30 tablet, Rfl: 11    VANIQA 13.9 % cream, AAA bid, Disp: 45 g, Rfl: 3    Past Medical History:   Diagnosis Date    Cataract     Depression     Hypertension     Other and unspecified hyperlipidemia     Trichiasis     Vertigo        Past Surgical History:   Procedure Laterality Date    CATARACT EXTRACTION W/  INTRAOCULAR LENS IMPLANT Left 12/03/2018    Dr Foreman     CATARACT EXTRACTION W/  INTRAOCULAR LENS IMPLANT Left 12/3/2018    Procedure: EXTRACTION, CATARACT, WITH IOL INSERTION;  Surgeon: Emilie Foreman MD;  Location: [facility];  Service: Ophthalmology;  Laterality: Left;    CATARACT EXTRACTION W/  INTRAOCULAR LENS IMPLANT Right 2/4/2019    Procedure: EXTRACTION, CATARACT, WITH IOL INSERTION;  Surgeon: Emilie Foreman MD;  Location: [facility];  Service: Ophthalmology;  Laterality: Right;    HYSTERECTOMY  8-2001    fibroids- BK/BSO    OOPHORECTOMY      TONSILLECTOMY      WISDOM TOOTH EXTRACTION           Review of Systems   Constitutional: Negative for appetite change, fever and unexpected weight change.   HENT: Positive for trouble swallowing.         No jaundice.   Respiratory: Negative for cough, shortness of breath and wheezing.         She denies  tobacco or alcohol consumption.  She denies aspiration hemoptysis chronic cough chronic sputum production or dyspnea on exertion.   Cardiovascular: Negative for chest pain.        She denies exertional chest pain or rhythm disturbance.  She states her hypertension hyperlipidemia are well controlled on the current medications.   Gastrointestinal: Positive for diarrhea and nausea. Negative for abdominal distention, abdominal pain, anal bleeding, blood in stool, constipation, rectal pain and vomiting.            Results  Colonoscopy (Order [id number])    Result Information      Status: Final result (Collected: 8/28/2015 09:32) Provider Status: Reviewed     Reviewed By      Carlos Hill MD on 8/28/2015 11:14      PACS Images     Show images for Colonoscopy    Colonoscopy  Order: [id number]  Status: Final result      Visible to patient: No (not released)      Next appt: 09/22/2022 at 12:00 PM in Outpatient Rehab (Temitope Botello PT)      0 Result Notes      1 HM Topic            Narrative  Performed by: LAZARO Robin   Patient Name: Lucas Mayer   Procedure Date: 8/28/2015 9:32 AM   MRN: [id number]   Account Number: [id number]   YOB: 1955   Age: 60   Gender: Female   Attending MD: Jojo Sofia MD   Procedure:            Colonoscopy   Indications:          Screening for colorectal malignant neoplasm   Providers:            Jjoo Sofia MD, Gail Sanderson RN, Georgina Sykes, Technologist   Referring MD:         Carlos Hill MD (Referring MD)   Complications:        No immediate complications.   Medicines:            Monitored Anesthesia Care   Procedure:            Pre-Anesthesia Assessment:                         - Pre-procedure physical examination revealed no                         contraindications to sedation.                         - ASA Grade Assessment: II - A patient with mild                         systemic disease.                         -  After reviewing the risks and benefits, the                         patient was deemed in satisfactory condition to                         undergo the procedure.                         - The anesthesia plan was to use monitored                         anesthesia care (MAC).                         - Immediately prior to administration of                         medications, the patient was re-assessed for                         adequacy to receive sedatives.                         - The heart rate, respiratory rate, oxygen                         saturations, blood pressure, adequacy of pulmonary                         ventilation, and response to care were monitored                         throughout the procedure.                         - The physical status of the patient was                         re-assessed after the procedure.                         After I obtained informed consent, the scope was                         passed under direct vision. Throughout the                         procedure, the patient's blood pressure, pulse, and                         oxygen saturations were monitored continuously. The                         Olympus scope [id number] ([id number]) was introduced                         through the anus and advanced to the cecum,                         identified by appendiceal orifice and ileocecal                         valve. The colonoscopy was performed without                         difficulty. The patient tolerated the procedure                         well. The quality of the bowel preparation was                         inadequate. The appendiceal orifice and the rectum                         were photographed.   Findings:        A 6 mm polyp was found in the descending colon. The polyp was        sessile. The polyp was removed with a cold biopsy forceps. Resection        and retrieval were complete.        Many small-mouthed diverticula were found in the sigmoid  colon and        in the descending colon.        Extensive amounts of solid stool was found in the ascending colon,        precluding visualization.        The exam was otherwise without abnormality on direct and        retroflexion views.   Impression:           - Preparation of the colon was inadequate.                         - One 6 mm polyp in the descending colon. Resected                         and retrieved.                         - Diverticulosis in the sigmoid colon and in the                         descending colon.                         - Stool in the ascending colon.                         - The examination was otherwise normal on direct                         and retroflexion views.   Recommendation:       - Discharge patient to home.                         - Repeat colonoscopy in 1 year because the bowel                         preparation was poor.   Jojo Sofia MD   8/28/2015 9:56:40 AM   This report has been verified and signed electronically.   Number of Addenda: 0   Note Initiated On: 8/28/2015 9:32 AM   Estimated Blood Loss: Estimated blood loss: none.    Specimen Collected: 08/28/15 09:32 Last Resulted: 08/28/15 09:56   Rakan as an Unsuccessful Attempt      Order Details       View Encounter       Lab and Collection Details       Routing       Result History            Result Care Coordination        Patient Communication       Release   Not seen Back to Top           Satisfied Health Maintenance Topics       Back to Top    Colorectal Cancer Screening (Colonoscopy - Yearly)  Overdue since 3/3/2021  Address Topic        All Reviewers List    Carlos Hill MD on 8/28/2015 11:14      When she ingest her meals she feels a pressure or tight feeling in the throat and substernal area is.  She has a history of fatty liver.  She realizes that she is overweight and she has tried to decrease her weight by decreasing her caloric intake but is been extremely difficult.  She denies a  personal history of diabetes but does have a family history of diabetes.   Musculoskeletal: Positive for arthralgias. Negative for back pain and neck pain.   Skin: Negative for pallor and rash.   Neurological: Negative for dizziness, seizures, syncope, speech difficulty, weakness and numbness.        She has a family history of essential tremor.  She has an essential tremor.  She has vertigo.  She has had an extensive evaluation and her ENT physician put on a small dose of Valium which has resolved the vertigo.   Hematological: Negative for adenopathy.   Psychiatric/Behavioral: Negative for confusion.       Objective:      Physical Exam  Vitals reviewed.   Constitutional:       Appearance: She is well-developed.      Comments: Well-nourished well-hydrated overweight afebrile nonicteric white female.  She is sitting comfortably in the chair.  She is breathing normally.  She is normocephalic.  Pupils are normal.  She appears to be oriented x3 and can relate her history and answer questions appropriately.   HENT:      Head: Normocephalic.   Eyes:      Pupils: Pupils are equal, round, and reactive to light.   Neck:      Thyroid: No thyromegaly.      Trachea: No tracheal deviation.   Cardiovascular:      Rate and Rhythm: Normal rate and regular rhythm.      Heart sounds: Normal heart sounds.   Pulmonary:      Effort: Pulmonary effort is normal.      Breath sounds: Normal breath sounds.   Abdominal:      General: Bowel sounds are normal. There is no distension.      Palpations: Abdomen is soft. There is no mass.      Tenderness: There is no abdominal tenderness. There is no guarding or rebound.      Hernia: No hernia is present.      Comments: The abdomen is soft it is obese without organomegaly or masses felt.  Bowel sounds are normal.   Musculoskeletal:         General: Normal range of motion.      Cervical back: Normal range of motion and neck supple.      Comments: She can ambulate normally.  She can go from the  sitting the standing position without difficulty.  She can get on the exam table without difficulty or assistance.  She does have an essential tremor.   Lymphadenopathy:      Cervical: No cervical adenopathy.   Skin:     General: Skin is warm and dry.   Neurological:      Mental Status: She is alert and oriented to person, place, and time.      Cranial Nerves: No cranial nerve deficit.   Psychiatric:         Behavior: Behavior normal.           Plan:       Incontinence of feces, unspecified fecal incontinence type    Dysphagia, unspecified type  -     Ambulatory referral/consult to Gastroenterology    Fatty liver    Obesity (BMI 35.0-39.9 without comorbidity)    Obese abdomen    She will continue her reflux regimen.  I have discussed obesity with her and fatty liver.  Weight loss would be ideal.  She will try to make a food diary and avoid the offending foods such as the fried and fatty foods and decrease her caloric intake.  Ultrasound of the abdomen upper GI x-rays and anorectal manometry will be scheduled initially.  She continues her other medications vitamins and minerals and follows up with her other physicians.

## 2022-08-24 NOTE — PATIENT INSTRUCTIONS
She will be scheduled for upper GI x-rays to evaluate the dysphagia.  She will need anorectal manometry to evaluate the fecal incontinence.  Her liver test her abnormal she has a history of fatty liver.  Labs will be obtained.  She last calcium was elevated 10.7 and will be repeated.  She continues her other medications vitamins and minerals.

## 2022-08-31 ENCOUNTER — PATIENT MESSAGE (OUTPATIENT)
Dept: UROGYNECOLOGY | Facility: CLINIC | Age: 67
End: 2022-08-31
Payer: MEDICARE

## 2022-08-31 ENCOUNTER — LAB VISIT (OUTPATIENT)
Dept: LAB | Facility: HOSPITAL | Age: 67
End: 2022-08-31
Attending: OBSTETRICS & GYNECOLOGY
Payer: MEDICARE

## 2022-08-31 DIAGNOSIS — N30.00 ACUTE CYSTITIS WITHOUT HEMATURIA: Primary | ICD-10-CM

## 2022-08-31 DIAGNOSIS — R39.15 URINARY URGENCY: ICD-10-CM

## 2022-08-31 DIAGNOSIS — R39.15 URINARY URGENCY: Primary | ICD-10-CM

## 2022-08-31 LAB
BACTERIA #/AREA URNS HPF: ABNORMAL /HPF
BILIRUB UR QL STRIP: NEGATIVE
CLARITY UR: ABNORMAL
COLOR UR: YELLOW
GLUCOSE UR QL STRIP: NEGATIVE
HGB UR QL STRIP: ABNORMAL
HYALINE CASTS #/AREA URNS LPF: 0 /LPF
KETONES UR QL STRIP: NEGATIVE
LEUKOCYTE ESTERASE UR QL STRIP: ABNORMAL
MICROSCOPIC COMMENT: ABNORMAL
NITRITE UR QL STRIP: NEGATIVE
PH UR STRIP: 6 [PH] (ref 5–8)
PROT UR QL STRIP: ABNORMAL
RBC #/AREA URNS HPF: >100 /HPF (ref 0–4)
SP GR UR STRIP: >=1.03 (ref 1–1.03)
SQUAMOUS #/AREA URNS HPF: 15 /HPF
URN SPEC COLLECT METH UR: ABNORMAL
UROBILINOGEN UR STRIP-ACNC: NEGATIVE EU/DL
WBC #/AREA URNS HPF: >100 /HPF (ref 0–5)

## 2022-08-31 PROCEDURE — 87086 URINE CULTURE/COLONY COUNT: CPT | Performed by: OBSTETRICS & GYNECOLOGY

## 2022-08-31 PROCEDURE — 81000 URINALYSIS NONAUTO W/SCOPE: CPT | Performed by: OBSTETRICS & GYNECOLOGY

## 2022-08-31 RX ORDER — SULFAMETHOXAZOLE AND TRIMETHOPRIM 800; 160 MG/1; MG/1
1 TABLET ORAL 2 TIMES DAILY
Qty: 6 TABLET | Refills: 0 | Status: SHIPPED | OUTPATIENT
Start: 2022-08-31 | End: 2023-11-06

## 2022-09-01 LAB — BACTERIA UR CULT: NORMAL

## 2022-09-07 ENCOUNTER — PATIENT MESSAGE (OUTPATIENT)
Dept: GASTROENTEROLOGY | Facility: CLINIC | Age: 67
End: 2022-09-07
Payer: MEDICARE

## 2022-09-08 ENCOUNTER — PATIENT MESSAGE (OUTPATIENT)
Dept: INTERNAL MEDICINE | Facility: CLINIC | Age: 67
End: 2022-09-08
Payer: MEDICARE

## 2022-09-22 ENCOUNTER — CLINICAL SUPPORT (OUTPATIENT)
Dept: REHABILITATION | Facility: HOSPITAL | Age: 67
End: 2022-09-22
Payer: MEDICARE

## 2022-09-22 DIAGNOSIS — R39.15 URINARY URGENCY: ICD-10-CM

## 2022-09-22 DIAGNOSIS — N39.46 MIXED INCONTINENCE: ICD-10-CM

## 2022-09-22 DIAGNOSIS — R15.9 FULL INCONTINENCE OF FECES: ICD-10-CM

## 2022-09-22 PROCEDURE — 97112 NEUROMUSCULAR REEDUCATION: CPT | Mod: PN

## 2022-09-22 PROCEDURE — 97162 PT EVAL MOD COMPLEX 30 MIN: CPT | Mod: PN

## 2022-09-22 NOTE — PATIENT INSTRUCTIONS
KEGELS in modified child's pose   1. Sit comfortably with legs and buttocks relaxed.  2. Contract and LIFT the pelvic floor muscles as if you're trying to stop the stream of urine and passage of gas.  3. Hold LIFT for 3 seconds without holding breath. You should be able to talk out loud the entire time.  4. Release the pelvic muscles right away for 10 seconds rest - take a couple slow, deep breaths   5. Repeat 10 times, 2 sets per day. Spread throughout the day.    No Kegels while urinating!     Home Exercise Program: 09/22/2022    DIAPHRAGMATIC BREATHING     The diaphragm is a dome shaped muscle that forms the floor of the rib cage. It is the most efficient muscle for breathing and relaxation, although most people are not used to using the diaphragm. Diaphragmatic or belly breathing is an important technique to learn because it helps settle down or relax the autonomic nervous system. The correct use of diaphragmatic breathing can help to quiet brain activity resulting in the relaxation of all the muscles and organs of the body. This is accomplished by slow rhythmic breathing concentrated in the diaphragm muscle rather than the chest.    How to do proper relaxation breathing:    Start by lying on your back or reclining in a chair in a relaxed position. Place one hand on your chest and the other on your abdomen.  Relax your jaw by placing your tongue on the floor of your mouth and keeping your teeth slightly apart.   Take a deep breath in, letting the abdomen expand and rise while you keep your upper chest, neck and shoulders relaxed.   As you breathe out, allow your abdomen and chest to fall. Exhale completely.  It doesn't matter if you breathe in/out through your nose and/or mouth. Do whichever feels comfortable.  Remember to breathe slowly.  Do not force your breathing. Do not hold your breath.  Repeat while in modified child's pose (pictured above). Perform 3 sets of 10 breaths, at least once/day.

## 2022-09-25 DIAGNOSIS — F32.A DEPRESSION, UNSPECIFIED DEPRESSION TYPE: ICD-10-CM

## 2022-09-25 RX ORDER — DIAZEPAM 2 MG/1
2 TABLET ORAL EVERY 8 HOURS PRN
Qty: 60 TABLET | Refills: 2 | Status: CANCELLED | OUTPATIENT
Start: 2022-09-25

## 2022-09-25 NOTE — TELEPHONE ENCOUNTER
No new care gaps identified.  [facility] Embedded Care Gaps. Reference number: [id number]. 9/25/2022   9:35:01 AM LAMINET

## 2022-09-27 ENCOUNTER — PATIENT MESSAGE (OUTPATIENT)
Dept: INTERNAL MEDICINE | Facility: CLINIC | Age: 67
End: 2022-09-27
Payer: MEDICARE

## 2022-09-27 NOTE — PLAN OF CARE
"[facility]  Pelvic Health Physical Therapy Initial Evaluation    Date: 2022   Name: Lucas Mayer  Clinic Number: [id number]  Therapy Diagnosis:   Encounter Diagnoses   Name Primary?    Mixed incontinence     Full incontinence of feces     Urinary urgency      Physician: Crescencio Quiles PA*    Physician Orders: PT Eval and Treat   Medical Diagnosis from Referral: Mixed incontinence [N39.46], Full incontinence of feces [R15.9], Urinary urgency [R39.15]  Evaluation Date: 2022  Authorization Period Expiration: 2022  Plan of Care Expiration: 2023  Visit # / Visits authorized:     Time In: 12:00  Time Out: 1:00  Total Appointment Time (timed & untimed codes): 60 minutes    Precautions: universal    Subjective     Date of onset: "I don't know, it's been so long"     History of current condition - Lucas reports: her bladder got to the point where she would have urinary incontinence when she "saw a bathroom." She has completely wet her pants before - over the last 6 months she estimates this has happened 3 times. Urinary urgency is at least 1-2x/week.   She has completely lost control of her bowels about 4 times in the last 6 months - problem originally started before  but it was very seldom (maybe about once/year). She gets fecal smearing almost daily, at least 3x/week.   Went to he doctor 2 days ago for anal rectal testing to test how her muscles function - hasn't gotten results yet.     OB/GYN History: , vaginal delivery, episiotomy, and perineal laceration  Sexually active? No; reconnected with HS boyfriend in 2019, but he lives on a boat in [place], occasionally they get together but they don't sleep together because he has ED    Pain:  none    Bladder/Bowel History:   Frequency of urination:   Daytime: easily 10x or more; could not wait 2 hrs between voids despite fluid intake or not.            Nighttime: once, around 4:30   Difficulty initiating urine " stream: No  Urine stream: strong  Complete emptying: Yes - does double voiding   Bladder leakage: Yes  Frequency of incidents: multiple times/day   Amount leaked (urine): few drops and full emptying - varies between those two  Urinary Urgency: Yes, Able to delay the urge for at least a few seconds running down the zhao   Frequency of bowel movements: with every feeding, sometimes more   Difficulty initiating BM: No  Quality/Shape of BM: [place] Stool Chart 4, 6, 7; can be a combination of a few types at once   Does Patient Feel Empty after BM? Yes  Fiber Supplements or Laxative Use? Yes - Fiber One and Align pre and probiotics   Colon leakage: Yes  Frequency of incidents: variable    Amount leaked (bowels): variable  Form of protection: pad - only if she's going to be out of the house for 2-3 hours or more        Medical History: Lucas  has a past medical history of Cataract, Depression, Hypertension, Other and unspecified hyperlipidemia, Trichiasis, and Vertigo.     Surgical History: Lucas Mayer  has a past surgical history that includes [place] tooth extraction; Oophorectomy; Tonsillectomy; Cataract extraction w/  intraocular lens implant (Left, 12/03/2018); Cataract extraction w/  intraocular lens implant (Left, 12/3/2018); Cataract extraction w/  intraocular lens implant (Right, 2/4/2019); and Hysterectomy (8-2001).    Medications: Lucas has a current medication list which includes the following prescription(s): amlodipine, atorvastatin, bifidobacterium infantis, calcium citrate-vitamin d3 315-200 mg, citalopram, clobetasol 0.05%, premarin, diazepam, ketoconazole, solifenacin, sulfamethoxazole-trimethoprim 800-160mg, and vaniqa.    Allergies:   Review of patient's allergies indicates:   Allergen Reactions    Ancef [cefazolin] Anaphylaxis    Codeine     Pcn [penicillins] Rash          Prior Therapy/Previous treatment included: none for this condition   Social History:  lives alone  Current exercise: to  be assessed   Occupation: Retired   Prior Level of Function: independent   Current Level of Function: see above     Types of fluid intake: water: 16 oz x4-6/day; sometimes unsweet tea; alcohol about once/week.   Diet: to be assessed    Abuse/Neglect: No     Pts goals: resolve bladder and bowel dysfunction.     OBJECTIVE     See EMR under MEDIA for written consent provided 9/22/2022  Chaperone: declined  BREATHING MECHANICS ASSESSMENT   Thorax Assessment During Quiet Respiration: WNL excursion of ribcage  Thorax Assessment During Deep Respiration: Decreased excursion of abdominal wall     VAGINAL PELVIC FLOOR EXAM    EXTERNAL ASSESSMENT  Introitus: WNL  Skin condition: redness noted  Scarring: none   Sensation: WNL   Pain: none  Voluntary contraction: visible lift  Voluntary relaxation: visible drop  Involuntary contraction: visible drop  Bearing down: visible drop        INTERNAL ASSESSMENT  Pain: tender areas noted as follows: obturator internus and levator ani bilateral    Sensation: able to localized pressure appropriately   Vaginal vault: asymmetrical   Muscle Bulk: atrophy with areas of tension   Muscle Power: 2/5  Muscle Endurance: 3 sec  # Reps To Fatigue: NT    Fast Contractions in 10 seconds: NT     Quality of contraction: decreased hold   Specificity: patient contracts: adductors/glutes   Coordination: tends to hold breath during PFM contration       Limitation/Restriction for FOTO Pelvic Urinary Survey and Bowel Leakage Survey    Therapist reviewed FOTO scores for Lucas Mayer on 9/22/2022.   FOTO documents entered into EPIC - see Media section.    Urinary Problem Limitation Score: 53%  Bowel Leakage Limitation Score: 44%       TREATMENT     Treatment Time In: 12:30  Treatment Time Out: 1:00  Total Treatment time (time-based codes) separate from Evaluation: 30 minutes      Neuromuscular Re-education to develop Coordination, Control, and Down training for 30 minutes including:   - Yaneli in  child's pose (modified)   - deep breathing in child's pose (modified)       Patient Education provided:   general anatomy/physiology of urinary/ bowel  system and benefits of treatment were discussed with the pt. Additionally, anatomy/physiology of pelvic floor, diaphragmatic breathing, and kegels were reviewed.     Home Exercises provided:  Written Home Exercises provided: yes.  Exercises were reviewed and Lucas was able to demonstrate them prior to the end of the session.    Lucas demonstrated good  understanding of the education provided.     See EMR under Patient Instructions for exercises provided 9/22/2022.    Assessment     Lucas is a 67 y.o. female referred to outpatient Physical Therapy with a medical diagnosis of Mixed incontinence [N39.46], Full incontinence of feces [R15.9], Urinary urgency [R39.15]. Pt presents with poor knowledge of body mechanics and posture, pelvic floor tenderness, decreased pelvic muscle strength, decreased endurance of the pelvic muscles, decreased phasic ability of the pelvic muscles, increased tension of the pelvic muscles, poor quality of pelvic muscle contraction, increased frequency of urination, poor coordination of pelvic floor muscles during ADL's leading to urinary or fecal leakage, poor fluid intake, dysfunctional voiding, and dysfunctional defecation.      Pt prognosis is Good.   Pt will benefit from skilled outpatient Physical Therapy to address the deficits stated above and in the chart below, provide pt/family education, and to maximize pt's level of independence.     Plan of care discussed with patient: Yes  Pt's spiritual, cultural and educational needs considered and patient is agreeable to the plan of care and goals as stated below:     Anticipated Barriers for therapy: chronicity of condition     Goals:  Short Term Goals: 6 weeks   - Pt will demonstrate excellent knowledge and adherence to HEP to facilitate optimal recovery.  - Pt will demonstrate proper PFM  contraction, relaxation, and lengthening coordinated with TA and breath for improved muscle coordination needed for functional activity.    Long Term Goals: 12 weeks   - Pt will demonstrate excellent knowledge and adherence to HEP for continued self-maintenance of symptoms.  - Pt will report FOTO Urinary Problem score of 47% limited or less indicating clinically relevant increase in function.  - Pt will report FOTO Bowel Leakage score of 56% limited or less indicating clinically relevant increase in function.  - Pt will report voiding interval of 2-3 hours for improved ADL tolerance.  - Pt will report ability to delay urinary urge for at least 15 minutes to maintain continence with ADL/IADLs.   - Pt will report little (drops) to no incidence of urinary or fecal incontinence 7/7 days for improved hygiene and ADL/IADL tolerance.   - Pt will report needing </= 1 pad/day indicating improved PFM function needed to maintain continence  - Pt will demonstrate PFM strength of at least 3/5 MMT for improved strength needed to maintain continence.   - Pt will report bearing down appropriately 100% of the time for improved bowel function and decreased stress on adjacent pelvic structures.         Plan     Plan of care Certification: 9/22/2022 to 1/22/2023.    Outpatient Physical Therapy 1 times weekly for 16 weeks to include the following interventions: therapeutic exercises, therapeutic activity, neuromuscular re-education, manual therapy, modalities PRN, patient/family education, dry needling, and self care/home management    Temitope Botello, PT

## 2022-09-28 DIAGNOSIS — M25.561 PAIN IN BOTH KNEES, UNSPECIFIED CHRONICITY: Primary | ICD-10-CM

## 2022-09-28 DIAGNOSIS — M25.562 PAIN IN BOTH KNEES, UNSPECIFIED CHRONICITY: Primary | ICD-10-CM

## 2022-10-07 ENCOUNTER — HOSPITAL ENCOUNTER (OUTPATIENT)
Dept: RADIOLOGY | Facility: HOSPITAL | Age: 67
Discharge: HOME | End: 2022-10-07
Attending: ORTHOPAEDIC SURGERY
Payer: MEDICARE

## 2022-10-07 DIAGNOSIS — M25.561 PAIN IN BOTH KNEES, UNSPECIFIED CHRONICITY: ICD-10-CM

## 2022-10-07 DIAGNOSIS — M25.562 PAIN IN BOTH KNEES, UNSPECIFIED CHRONICITY: ICD-10-CM

## 2022-10-07 PROCEDURE — 73562 X-RAY EXAM OF KNEE 3: CPT | Mod: TC,50,PN

## 2022-10-07 PROCEDURE — 73562 X-RAY EXAM OF KNEE 3: CPT | Mod: 26,50,, | Performed by: RADIOLOGY

## 2022-10-07 PROCEDURE — 73562 XR KNEE 3 VIEW BILATERAL: ICD-10-PCS | Mod: 26,50,, | Performed by: RADIOLOGY

## 2022-10-12 ENCOUNTER — OFFICE VISIT (OUTPATIENT)
Dept: ORTHOPEDICS | Facility: CLINIC | Age: 67
End: 2022-10-12
Payer: MEDICARE

## 2022-10-12 VITALS — HEIGHT: 62 IN | BODY MASS INDEX: 38.91 KG/M2 | WEIGHT: 211.44 LBS | RESPIRATION RATE: 14 BRPM

## 2022-10-12 DIAGNOSIS — M23.51 CHRONIC INSTABILITY OF RIGHT KNEE: ICD-10-CM

## 2022-10-12 DIAGNOSIS — M25.561 BILATERAL CHRONIC KNEE PAIN: ICD-10-CM

## 2022-10-12 DIAGNOSIS — M17.0 PRIMARY OSTEOARTHRITIS OF BOTH KNEES: Primary | ICD-10-CM

## 2022-10-12 DIAGNOSIS — M70.51 PES ANSERINUS BURSITIS OF BOTH KNEES: ICD-10-CM

## 2022-10-12 DIAGNOSIS — M23.204 DEGENERATIVE TEAR OF MEDIAL MENISCUS OF BOTH KNEES: ICD-10-CM

## 2022-10-12 DIAGNOSIS — M71.20 BAKER CYST, UNSPECIFIED LATERALITY: ICD-10-CM

## 2022-10-12 DIAGNOSIS — M25.561 CHRONIC PAIN OF BOTH KNEES: ICD-10-CM

## 2022-10-12 DIAGNOSIS — M25.562 CHRONIC PAIN OF BOTH KNEES: ICD-10-CM

## 2022-10-12 DIAGNOSIS — G89.29 BILATERAL CHRONIC KNEE PAIN: ICD-10-CM

## 2022-10-12 DIAGNOSIS — G89.29 CHRONIC PAIN OF BOTH KNEES: ICD-10-CM

## 2022-10-12 DIAGNOSIS — M23.203 DEGENERATIVE TEAR OF MEDIAL MENISCUS OF BOTH KNEES: ICD-10-CM

## 2022-10-12 DIAGNOSIS — M23.52 CHRONIC INSTABILITY OF LEFT KNEE: ICD-10-CM

## 2022-10-12 DIAGNOSIS — M25.562 BILATERAL CHRONIC KNEE PAIN: ICD-10-CM

## 2022-10-12 DIAGNOSIS — M70.52 PES ANSERINUS BURSITIS OF BOTH KNEES: ICD-10-CM

## 2022-10-12 PROCEDURE — 1100F PR PT FALLS ASSESS DOC 2+ FALLS/FALL W/INJURY/YR: ICD-10-PCS | Mod: CPTII,S$GLB,, | Performed by: ORTHOPAEDIC SURGERY

## 2022-10-12 PROCEDURE — 1159F PR MEDICATION LIST DOCUMENTED IN MEDICAL RECORD: ICD-10-PCS | Mod: CPTII,S$GLB,, | Performed by: ORTHOPAEDIC SURGERY

## 2022-10-12 PROCEDURE — 20610 DRAIN/INJ JOINT/BURSA W/O US: CPT | Mod: 50,S$GLB,, | Performed by: ORTHOPAEDIC SURGERY

## 2022-10-12 PROCEDURE — 1100F PTFALLS ASSESS-DOCD GE2>/YR: CPT | Mod: CPTII,S$GLB,, | Performed by: ORTHOPAEDIC SURGERY

## 2022-10-12 PROCEDURE — 20610 LARGE JOINT ASPIRATION/INJECTION: BILATERAL ANSERINE BURSA: ICD-10-PCS | Mod: 50,S$GLB,, | Performed by: ORTHOPAEDIC SURGERY

## 2022-10-12 PROCEDURE — 99999 PR PBB SHADOW E&M-EST. PATIENT-LVL III: CPT | Mod: PBBFAC,,, | Performed by: ORTHOPAEDIC SURGERY

## 2022-10-12 PROCEDURE — 1126F PR PAIN SEVERITY QUANTIFIED, NO PAIN PRESENT: ICD-10-PCS | Mod: CPTII,S$GLB,, | Performed by: ORTHOPAEDIC SURGERY

## 2022-10-12 PROCEDURE — 3008F BODY MASS INDEX DOCD: CPT | Mod: CPTII,S$GLB,, | Performed by: ORTHOPAEDIC SURGERY

## 2022-10-12 PROCEDURE — 1126F AMNT PAIN NOTED NONE PRSNT: CPT | Mod: CPTII,S$GLB,, | Performed by: ORTHOPAEDIC SURGERY

## 2022-10-12 PROCEDURE — 99999 PR PBB SHADOW E&M-EST. PATIENT-LVL III: ICD-10-PCS | Mod: PBBFAC,,, | Performed by: ORTHOPAEDIC SURGERY

## 2022-10-12 PROCEDURE — 3008F PR BODY MASS INDEX (BMI) DOCUMENTED: ICD-10-PCS | Mod: CPTII,S$GLB,, | Performed by: ORTHOPAEDIC SURGERY

## 2022-10-12 PROCEDURE — 3288F PR FALLS RISK ASSESSMENT DOCUMENTED: ICD-10-PCS | Mod: CPTII,S$GLB,, | Performed by: ORTHOPAEDIC SURGERY

## 2022-10-12 PROCEDURE — 1159F MED LIST DOCD IN RCRD: CPT | Mod: CPTII,S$GLB,, | Performed by: ORTHOPAEDIC SURGERY

## 2022-10-12 PROCEDURE — 99204 PR OFFICE/OUTPT VISIT, NEW, LEVL IV, 45-59 MIN: ICD-10-PCS | Mod: 25,S$GLB,, | Performed by: ORTHOPAEDIC SURGERY

## 2022-10-12 PROCEDURE — 99204 OFFICE O/P NEW MOD 45 MIN: CPT | Mod: 25,S$GLB,, | Performed by: ORTHOPAEDIC SURGERY

## 2022-10-12 PROCEDURE — 3288F FALL RISK ASSESSMENT DOCD: CPT | Mod: CPTII,S$GLB,, | Performed by: ORTHOPAEDIC SURGERY

## 2022-10-12 RX ORDER — TRIAMCINOLONE ACETONIDE 40 MG/ML
40 INJECTION, SUSPENSION INTRA-ARTICULAR; INTRAMUSCULAR
Status: DISCONTINUED | OUTPATIENT
Start: 2022-10-12 | End: 2022-10-12 | Stop reason: HOSPADM

## 2022-10-12 RX ADMIN — TRIAMCINOLONE ACETONIDE 40 MG: 40 INJECTION, SUSPENSION INTRA-ARTICULAR; INTRAMUSCULAR at 10:10

## 2022-10-12 NOTE — PROCEDURES
Large Joint Aspiration/Injection: bilateral anserine bursa    Date/Time: 10/12/2022 10:15 AM  Performed by: Moreno Rodrigues DO  Authorized by: Moreno Rodrigues, DO     Consent Done?:  Yes (Verbal)  Indications:  Diagnostic evaluation and pain  Site marked: the procedure site was marked    Timeout: prior to procedure the correct patient, procedure, and site was verified    Prep: patient was prepped and draped in usual sterile fashion      Details:  Needle Size:  22 G  Ultrasonic Guidance for needle placement?: No    Approach:  Anteromedial  Location:  Knee  Laterality:  Bilateral  Site:  Bilateral anserine bursa  Medications (Right):  40 mg triamcinolone acetonide 40 mg/mL  Medications (Left):  40 mg triamcinolone acetonide 40 mg/mL  Patient tolerance:  Patient tolerated the procedure well with no immediate complications

## 2022-10-12 NOTE — PROGRESS NOTES
Subjective:      Patient ID: Lucas Mayer is a 67 y.o. female.    Chief Complaint: Pain of the Left Knee and Pain of the Right Knee    Referring Provider: Jean Carlos Samuels Md  [address]  [address]  [place] [address]    HPI:  Ms. Mayer is a 67-year-old lady who presented today for evaluation of approximately 4-1/2 years of bilateral knee pain, her right is worse than her left.  She stated the pain [place] and after she was walking on the beach she still got progressive pain in her knees.  Since that she has fallen multiple times twisting her knees.  The last time she fell was approximately 2 months ago.  She stated, I got rubbery knees .  She feels her knees are somewhat unstable.  Walking and standing for extended periods increases her symptoms while sitting with her legs elevated decreases them.  She has used icy Hot on her knees which helps.  She gets swelling and giving way but denied locking.  She has taken NSAIDs with help.  She did physical therapy approximately 3 years ago which did help.  She has not worn braces nor had injections.  She can walk approximately 1 mi and climb approximately 5-10 stairs.  She does not use an ambulatory assistive device.    Past Medical History:   Diagnosis Date    Cataract     Depression     Hypertension     Hyperlipidemia     Trichiasis      *  *  *  *  * Vertigo  Seasonal allergies  GERD  Diverticulitis   Psoriasis   Stability issues      Past Surgical History:   Procedure Laterality Date    CATARACT EXTRACTION W/  INTRAOCULAR LENS IMPLANT Left 12/03/2018    Dr Formean     CATARACT EXTRACTION W/  INTRAOCULAR LENS IMPLANT Left 12/3/2018    Procedure: EXTRACTION, CATARACT, WITH IOL INSERTION;  Surgeon: Emilie Foreman MD;  [facility];  Service: Ophthalmology;  Laterality: Left;    CATARACT EXTRACTION W/  INTRAOCULAR LENS IMPLANT Right 2/4/2019    Procedure: EXTRACTION, CATARACT,  WITH IOL INSERTION;  Surgeon: Emilie Foreman MD;  Location: [facility];  Service: Ophthalmology;  Laterality: Right;    HYSTERECTOMY  8-2001   * COLONOSCOPY    OOPHORECTOMY      TONSILLECTOMY      WISDOM TOOTH EXTRACTION         Review of patient's allergies indicates:   Allergen Reactions    Ancef [cefazolin] Anaphylaxis    Codeine     Pcn [penicillins] Rash       Social History     Occupational History    Retired    Tobacco Use    Smoking status: Never    Smokeless tobacco: Never   Substance and Sexual Activity    Alcohol use: Yes     Alcohol/week: 0.8 standard drinks     Types: 1 Standard drinks or equivalent per week     Comment: Social    Drug use: No    Sexual activity: Not Currently     Partners: Male      Family History   Problem Relation Age of Onset    Diabetes Mother     Stroke Mother     Essential Tremor Mother     Melanoma Father     Hypertension Father     Essential Tremor Sister     Essential Tremor Sister     Breast cancer Neg Hx     Cancer Neg Hx     Eclampsia Neg Hx        Previous Hospitalizations:  Childbirth, vertigo, stress.    ROS:   Review of Systems   Constitutional: Negative for chills and fever.   HENT:  Negative for congestion.    Eyes:  Negative for blurred vision and pain.   Cardiovascular:  Negative for chest pain and cyanosis.   Respiratory:  Negative for cough and shortness of breath.    Endocrine: Negative for cold intolerance and polydipsia.   Hematologic/Lymphatic: Negative for adenopathy.   Skin:  Positive for rash. Negative for flushing, itching and skin cancer.   Musculoskeletal:  Positive for falls, joint pain and joint swelling. Negative for gout.   Gastrointestinal:  Positive for heartburn. Negative for constipation and diarrhea.   Genitourinary:  Negative for nocturia.   Neurological:  Negative for headaches and seizures.   Psychiatric/Behavioral:  Positive for depression. Negative for substance abuse. The patient is nervous/anxious.    Allergic/Immunologic:  Positive for environmental allergies.         Objective:      Physical Exam:   General: AAOx3.  No acute distress  HEENT: Normocephalic, PEARLA EOMI, fair Dentition  Neck: Supple, No JVD  Chest: Symetric, equal excursion on inspiration  Abdomen: Soft NTND  Vascular:  Pulses intact and equal bilaterally.  Capillary refill less than 3 seconds and equal bilaterally  Neurologic:  Pinprick and soft touch intact and equal bilaterally  Integment:  No ecchymosis, no errythema  Extremity:  Knee:  Extension/flexion equal bilaterally 0/118°.  Crepitus with motion both knees.  Mild effusion both knees.  Baker cyst, both knees.  Drop home positive both knees.  Negative patellar load/compression bilaterally.  Negative patella apprehension/relocation bilaterally.  Increased excursion with valgus stressing both knees.  Varus stressing equal bilaterally with endpoint.  Mild increased excursion with Lachman's/drawer both knees.  Positive joint line tenderness both knees.  Juvencio negative bilaterally.  Fannin positive bilaterally.  Tender at the anserine insertion both knees.  Swelling at the anserine insertion both knees.  Radiography:  Personally reviewed x-rays of both knees completed on 10/07/2022 showed tricompartmental arthritic changes with near bone-on-bone articulation and osteophytes.      Assessment:       Impression:      1. Advanced tricompartmental arthritis, both knees   2. Baker cyst, both knees   3. Degenerative tear of medial meniscus, both knees    4. Instability, both knees   5.   6.  Pes anserine bursitis, both knees   Chronic bilateral knee pain         Plan:       1.  Discussed physical examination and radiographic findings with the patient. Lucas understands that she has advanced arthritis of both knees along with pes anserine bursitis.  Treatment alternatives and outcomes were discussed with the patient she understands she could be treated conservatively with observation, activity modification, NSAIDs,  bracing, physical therapy, injections, or she could be treated surgically with arthroscopy versus total knee arthroplasty.  She understands if she were to proceed with surgery her best treatment option would most likely be artificial knee replacement as she has advanced arthritis with bone-on-bone articulation.  She states she did not want surgery.  Discussed with the patient that she could also have injections with either cortisone or viscosupplementation but viscosupplementation needs to be preauthorized.  After injections and conservative care if she does not want surgery in the future she can follow-up with either PCM or pain management until she is ready to proceed with surgery.  2. Offered a steroid injection to both knees, she elected to proceed.  3. Offered a steroid injection to the anserine insertion of both knees, she elected to proceed.  4. Lakhwinder Daniels global brace, both knees, prescription for the patient to obtain the braces was prepared for her.  5. Start doing home exercises such as quadriceps and hamstring strengthening.  6. Take NSAIDs as tolerated allowed by PCM.    7. Recommend she purchase over-the-counter Voltaren gel and applied to her knees twice daily and massage in for 2 minutes.  8. Submit for preauthorization for viscosupplementation.    9. Follow up when viscosupplementation is preauthorized for injection.

## 2022-10-12 NOTE — PROCEDURES
Large Joint Aspiration/Injection: bilateral knee    Date/Time: 10/12/2022 10:15 AM  Performed by: Moreno Rodrigues DO  Authorized by: Moreno Rodrigues, DO     Consent Done?:  Yes (Verbal)  Indications:  Arthritis, diagnostic evaluation, joint swelling and pain  Site marked: the procedure site was marked    Timeout: prior to procedure the correct patient, procedure, and site was verified    Prep: patient was prepped and draped in usual sterile fashion      Details:  Needle Size:  22 G  Ultrasonic Guidance for needle placement?: No    Approach:  Anterolateral  Location:  Knee  Laterality:  Bilateral  Site:  Bilateral knee  Medications (Right):  10 mg triamcinolone acetonide 10 mg/mL  Medications (Left):  10 mg triamcinolone acetonide 10 mg/mL  Patient tolerance:  Patient tolerated the procedure well with no immediate complications

## 2022-10-20 ENCOUNTER — OFFICE VISIT (OUTPATIENT)
Dept: UROGYNECOLOGY | Facility: CLINIC | Age: 67
End: 2022-10-20
Payer: MEDICARE

## 2022-10-20 VITALS
DIASTOLIC BLOOD PRESSURE: 70 MMHG | HEART RATE: 65 BPM | SYSTOLIC BLOOD PRESSURE: 149 MMHG | WEIGHT: 211.88 LBS | HEIGHT: 62 IN | BODY MASS INDEX: 38.99 KG/M2

## 2022-10-20 DIAGNOSIS — N95.2 ATROPHIC VAGINITIS: ICD-10-CM

## 2022-10-20 DIAGNOSIS — N39.46 MIXED INCONTINENCE: Primary | ICD-10-CM

## 2022-10-20 DIAGNOSIS — R39.15 URINARY URGENCY: ICD-10-CM

## 2022-10-20 PROCEDURE — 3288F PR FALLS RISK ASSESSMENT DOCUMENTED: ICD-10-PCS | Mod: CPTII,S$GLB,, | Performed by: OBSTETRICS & GYNECOLOGY

## 2022-10-20 PROCEDURE — 1126F AMNT PAIN NOTED NONE PRSNT: CPT | Mod: CPTII,S$GLB,, | Performed by: OBSTETRICS & GYNECOLOGY

## 2022-10-20 PROCEDURE — 3077F SYST BP >= 140 MM HG: CPT | Mod: CPTII,S$GLB,, | Performed by: OBSTETRICS & GYNECOLOGY

## 2022-10-20 PROCEDURE — 87086 URINE CULTURE/COLONY COUNT: CPT | Performed by: OBSTETRICS & GYNECOLOGY

## 2022-10-20 PROCEDURE — 1101F PT FALLS ASSESS-DOCD LE1/YR: CPT | Mod: CPTII,S$GLB,, | Performed by: OBSTETRICS & GYNECOLOGY

## 2022-10-20 PROCEDURE — 99213 PR OFFICE/OUTPT VISIT, EST, LEVL III, 20-29 MIN: ICD-10-PCS | Mod: S$GLB,,, | Performed by: OBSTETRICS & GYNECOLOGY

## 2022-10-20 PROCEDURE — 3288F FALL RISK ASSESSMENT DOCD: CPT | Mod: CPTII,S$GLB,, | Performed by: OBSTETRICS & GYNECOLOGY

## 2022-10-20 PROCEDURE — 3078F PR MOST RECENT DIASTOLIC BLOOD PRESSURE < 80 MM HG: ICD-10-PCS | Mod: CPTII,S$GLB,, | Performed by: OBSTETRICS & GYNECOLOGY

## 2022-10-20 PROCEDURE — 1159F PR MEDICATION LIST DOCUMENTED IN MEDICAL RECORD: ICD-10-PCS | Mod: CPTII,S$GLB,, | Performed by: OBSTETRICS & GYNECOLOGY

## 2022-10-20 PROCEDURE — 1159F MED LIST DOCD IN RCRD: CPT | Mod: CPTII,S$GLB,, | Performed by: OBSTETRICS & GYNECOLOGY

## 2022-10-20 PROCEDURE — 1126F PR PAIN SEVERITY QUANTIFIED, NO PAIN PRESENT: ICD-10-PCS | Mod: CPTII,S$GLB,, | Performed by: OBSTETRICS & GYNECOLOGY

## 2022-10-20 PROCEDURE — 99999 PR PBB SHADOW E&M-EST. PATIENT-LVL III: ICD-10-PCS | Mod: PBBFAC,,, | Performed by: OBSTETRICS & GYNECOLOGY

## 2022-10-20 PROCEDURE — 99999 PR PBB SHADOW E&M-EST. PATIENT-LVL III: CPT | Mod: PBBFAC,,, | Performed by: OBSTETRICS & GYNECOLOGY

## 2022-10-20 PROCEDURE — 99213 OFFICE O/P EST LOW 20 MIN: CPT | Mod: S$GLB,,, | Performed by: OBSTETRICS & GYNECOLOGY

## 2022-10-20 PROCEDURE — 3078F DIAST BP <80 MM HG: CPT | Mod: CPTII,S$GLB,, | Performed by: OBSTETRICS & GYNECOLOGY

## 2022-10-20 PROCEDURE — 3077F PR MOST RECENT SYSTOLIC BLOOD PRESSURE >= 140 MM HG: ICD-10-PCS | Mod: CPTII,S$GLB,, | Performed by: OBSTETRICS & GYNECOLOGY

## 2022-10-20 PROCEDURE — 1101F PR PT FALLS ASSESS DOC 0-1 FALLS W/OUT INJ PAST YR: ICD-10-PCS | Mod: CPTII,S$GLB,, | Performed by: OBSTETRICS & GYNECOLOGY

## 2022-10-20 RX ORDER — SOLIFENACIN SUCCINATE 5 MG/1
5 TABLET, FILM COATED ORAL 2 TIMES DAILY
Qty: 60 TABLET | Refills: 11 | Status: SHIPPED | OUTPATIENT
Start: 2022-10-20 | End: 2023-12-04 | Stop reason: SDUPTHER

## 2022-10-20 NOTE — PROGRESS NOTES
Subjective:       Patient ID: Lucas Mayer is a 67 y.o. female.    Chief Complaint:  Follow-up      History of Present Illness  HPI 67 Y O F  has a past medical history of Cataract, Depression, Hypertension, Other and unspecified hyperlipidemia, Trichiasis, and Vertigo.  Referred by Dr. Samuels  for evaluation of urinary incontinence. This has been an issue since 2000. She feels that the symptoms overall have worsened since her hysterectomy in 2000. She now changes pads 2/3 times per day has both stress and urge incontinence- especially at capacity.     10/20/2022  80 % improvement in urinary symptoms   No longer wears a pad during the day does wear one at night -wakes up at 5:30 in the am   Using the vaginal estrogen irregularly         Ohs Peq Urogyn Hpi    Question 6/15/2022 11:48 AM CDT - Filed by Abigail Spears MA   General Urogynecology: Are you experiencing the following?    Dysuria (painful urination) Yes   Nocturia:  waking up at night to empty your bladder  Yes   If you answered yes to the previous question, how many times does this happen per night? 1-2   Enuresis (urine loss during sleep) No   Dribbling urine after you urinate Yes   Hematuria (urine appears red) No   Type of stream Interrupted   Urinary frequency: How often a day are you going to the bathroom per day?  Less than 10   Urinary Tract Infections: How many Urinary Tract Infections have you had in the past year? I have not had a UTI in the past year   If you have had a UTI in the past year, what treatments have you had so far?  Cranberry supplementation    I have not had a UTI in the past year   Urinary Incontinence (General): Are you experiencing the following?    Past consultation for incontinence: Have you ever seen someone for the evaluation of incontinence? Yes   If you answered yes to the previous question, please select all the therapies you have tried.  Kegals    Pelvic floor physical therapy   Please note the effectiveness of  "the therapies. Somewhat effective   Need to wear protection to keep clothes dry  Yes   If you answered yes to the previous question, please eduardo the protection you use.  Panty liner   If you wear protection, how much wetness is typically on each pad? Slight   If you wear protection, how often do you have to change per day, if applicable?  2   Stress Symptoms: Are you experiencing the following?    Leakage of urine with cough, laugh and/or sneeze Yes   If you answered yes to the previous question, what is the frequency in days, weeks and/or months? Daily   Leakage of urine with sex No   Leakage of urine with bending/ lifting Yes   Leakage of urine with briskly walking or jogging Yes   If you lose urine for any other reason not previously mentioned, please note it below, if applicable.  just seeing a toilet will make make me have to go   Urge Symptoms: Are you experiencing the following?    Urgency ("got to go" feeling) Yes   Urge: How frequently do you feel an urge to urinate (feeling like you "gotta go" to the bathroom and can't wait) Daily   Do you experience a leakage of urine when you have a feeling of urgency?  Yes   Leakage of urine when unaware Yes   Past use of anticholinergics (medications used to treat overactive bladder) No   If you answered yes to the previous question, please eduardo the anticholinergics you have used:     Have you ever used Mirbetriq (aka Mirabegron)?  No   Prolapse Symptoms: Are you experiencing any of the following?     Falling out/ Bulging/ Heaviness in the vagina Yes   Vaginal/ Abdominal Pain/ Pressure Yes   Need to strain/ Push to void Yes   Need to wait on the toilet before you void Yes   Unusual position to urinate (using your hands to push back the vaginal bulge) No   Sensation of incomplete emptying Yes   Past use of pessary device No   If you answered yes to the previous question, please list the devices you have used below.     Bowel Symptoms: Are you experiencing any of the " following?    Constipation No   Diarrhea  Yes   Hematochezia (bloody stool) No   Incomplete evacuation of stool Yes   Involuntary loss of formed stool Yes   Fecal smearing/urgency Yes   Involuntary loss of gas Yes   Vaginal Symptoms: Are you experiencing any of the following?     Abnormal vaginal bleeding  No   Vaginal dryness No   Sexually active  No   Dyspareunia (painful intercourse) No   Estrogen use  No       GYN & OB History  No LMP recorded. Patient has had a hysterectomy.   Date of Last Pap: No result found    OB History    Para Term  AB Living   3 1     2 1   SAB IAB Ectopic Multiple Live Births                  # Outcome Date GA Lbr Palmer/2nd Weight Sex Delivery Anes PTL Lv   3 AB            2 AB            1 Para              OB     x 1  C/s x 0   Largest: 7 # 7 oz   Forceps: no  Episiotomy:  unknown  Degree: 3rd or 4th unknown    GYN  Menarche:  11  Menstrual cycle:   Menopause: 47  Hysterectomy:  open: indication: Fibroids  Ovaries: removed   Tubal ligation: NO   Other abdominal surgeries: Lap  Arcelia     Review of Systems  Review of Systems   Constitutional: Negative.  Negative for activity change, appetite change, chills, diaphoresis, fatigue, fever and unexpected weight change.   HENT: Negative.     Eyes: Negative.    Respiratory: Negative.  Negative for apnea, cough and wheezing.    Cardiovascular: Negative.  Negative for chest pain and palpitations.   Gastrointestinal:  Negative for abdominal distention, abdominal pain, anal bleeding, blood in stool, constipation, diarrhea, nausea, rectal pain and vomiting.   Endocrine: Negative.    Genitourinary:  Positive for frequency and urgency. Negative for decreased urine volume, difficulty urinating, dyspareunia, dysuria, enuresis, flank pain, genital sores, hematuria, menstrual problem, pelvic pain, vaginal bleeding, vaginal discharge and vaginal pain.   Musculoskeletal:  Negative for back pain and gait problem.   Skin:  Negative for color  change, pallor, rash and wound.   Allergic/Immunologic: Negative for immunocompromised state.   Neurological: Negative.  Negative for dizziness and speech difficulty.   Hematological:  Negative for adenopathy.   Psychiatric/Behavioral:  Negative for agitation, behavioral problems, confusion and sleep disturbance.          Objective:     Physical Exam   Constitutional: She is oriented to person, place, and time. She appears well-developed.   HENT:   Head: Normocephalic and atraumatic.   Eyes: Conjunctivae and EOM are normal.   Cardiovascular: Normal rate, regular rhythm, S1 normal, S2 normal, normal heart sounds and intact distal pulses.   Pulmonary/Chest: Effort normal and breath sounds normal. She exhibits no tenderness.   Abdominal: Soft. Bowel sounds are normal. She exhibits no distension and no mass. There is no splenomegaly or hepatomegaly. There is no abdominal tenderness. There is no rigidity, no rebound and no guarding. Hernia confirmed negative in the right inguinal area and confirmed negative in the left inguinal area.   Genitourinary: Pelvic exam was performed with patient supine. Rectum normal, vagina normal, skenes normal and bartholins normal. Right labia normal and left labia normal. Urethra exhibits hypermobility. Urethra exhibits no urethral caruncle, no urethral diverticulum and no urethral mass. Right bartholin is not enlarged and not tender. Left bartholin is not enlarged and not tender. Rectal exam shows resting tone normal and active tone normal. Rectal exam shows no external hemorrhoid, no fissure, no tenderness, anal tone normal and no dovetailing. Guaiac negative stool. There is atrophy in the vagina. No foreign body, tenderness, bleeding, unspecified prolapse of vaginal walls, fistula, mesh exposure or lavator tenderness in the vagina. Right adnexum displays no tenderness. Left adnexum displays no tenderness. Uterus is absent.   PVR: 80 ML  Empty cough stress test: Negative.  Kegel:  4/5    POP-Q  Aa: -1 Ba: -1 C: -5   GH: 3 PB: 2 TVL: 8   Ap: -1 Bp: -1 D:                      Musculoskeletal:         General: Normal range of motion.      Cervical back: Normal range of motion and neck supple.   Neurological: She is alert and oriented to person, place, and time. She has normal strength and normal reflexes. Cranial nerves II through XII intact. No cranial nerve deficit.   Skin: Skin is warm and dry.   Psychiatric: She has a normal mood and affect. Her speech is normal and behavior is normal. Judgment normal.     The urethral was cleaned with Betadine swab, a 10 Swedish catheter was easily passed using sterile technique and the bladder drained. Approximately  80 mL's of clear/cloudy urine was obtained. The patient tolerated the procedure well.      HCM  Pap's: No history of abnormal, none recently   Mammo: BIRADS: 1; Last imaging  2021  Colonoscopy: N/a: normal   Dexa: Normal bone mineral density 2022    Assessment:        1. Mixed incontinence    2. Atrophic vaginitis             Plan:        1.  Mixed urinary incontinence, urge > stress:   --Bladder diary for 3-7 days, write the number of times you go to the rest room and associated symptoms of urgency or leakage.   --Empty bladder every 3 hours.  Empty well: wait a minute, lean forward on toilet.    --Avoid dietary irritants (see sheet).  Keep diary x 3-5 days to determine your irritants.  --KEGELS: do 10 in AM and 10 in PM, holding each x 10 seconds.  When you feel urge to go, STOP, KEGEL, and when urge has passed, then go to bathroom.  Start pelvic floor  PT (wants to have the PT in [place]) .    --URGE: increase vesicare 5 mg to one in the am and one at night.  SE profile reviewed.   Takes 2-4 weeks to see if will have effect.  For dry mouth: get sour, sugar free lozenge or gum.    --STRESS:  Pessary vs. Sling.      2. Fecal incontinence: improved   --Fiber may better control cholesterol and blood glucose.  Daily fiber.  Take 1 tsp of fiber powder  (psyllium or other sugar-free powder).  Mix in 8 oz of water.  Take x 3-5 days.  Then, increase fiber by 1 tsp every 3-5 days until stool is easy to pass.    --Keep a bowel diary   --Consider Lomotil in the future  --Pelvic floor PT  --Endoanal sonogram to evaluate the sphincter   --Anal manometry   --We discussed treatment option including Pelvic floor PT, SNS, and Sphincteroplasty. Risks, benefits reviewed in detail. Pelvic floor PT.   --Consider Colorectal evaluation in the future     3. Lichens Sclerosis   vaginal clobetasol as needed     4. Vaginal atrophy (dryness):   1 gram of estrogen cream in vagina nightly twice a week    Isabel Sweet DO  Female Pelvic Medicine and Reconstructive Surgery  [facility] [place], [place]

## 2022-10-20 NOTE — PATIENT INSTRUCTIONS
Mixed urinary incontinence, urge > stress:   --Bladder diary for 3-7 days, write the number of times you go to the rest room and associated symptoms of urgency or leakage.   --Empty bladder every 3 hours.  Empty well: wait a minute, lean forward on toilet.    --Avoid dietary irritants (see sheet).  Keep diary x 3-5 days to determine your irritants.  --KEGELS: do 10 in AM and 10 in PM, holding each x 10 seconds.  When you feel urge to go, STOP, KEGEL, and when urge has passed, then go to bathroom.  Start pelvic floor  PT (wants to have the PT in [place]) .    --URGE: increase vesicare 5 mg to one in the am and one at night.  SE profile reviewed.   Takes 2-4 weeks to see if will have effect.  For dry mouth: get sour, sugar free lozenge or gum.    --STRESS:  Pessary vs. Sling.      2. Fecal incontinence: improved   --Fiber may better control cholesterol and blood glucose.  Daily fiber.  Take 1 tsp of fiber powder (psyllium or other sugar-free powder).  Mix in 8 oz of water.  Take x 3-5 days.  Then, increase fiber by 1 tsp every 3-5 days until stool is easy to pass.    --Keep a bowel diary   --Consider Lomotil in the future  --Pelvic floor PT  --Endoanal sonogram to evaluate the sphincter   --Anal manometry   --We discussed treatment option including Pelvic floor PT, SNS, and Sphincteroplasty. Risks, benefits reviewed in detail. Pelvic floor PT.   --Consider Colorectal evaluation in the future     3. Lichens Sclerosis   vaginal clobetasol as needed     4. Vaginal atrophy (dryness):   1 gram of estrogen cream in vagina nightly twice a week

## 2022-10-21 LAB
BACTERIA UR CULT: NORMAL
BACTERIA UR CULT: NORMAL

## 2022-10-24 ENCOUNTER — PATIENT MESSAGE (OUTPATIENT)
Dept: UROGYNECOLOGY | Facility: CLINIC | Age: 67
End: 2022-10-24
Payer: MEDICARE

## 2022-10-30 ENCOUNTER — PATIENT MESSAGE (OUTPATIENT)
Dept: UROGYNECOLOGY | Facility: CLINIC | Age: 67
End: 2022-10-30
Payer: MEDICARE

## 2022-12-23 ENCOUNTER — TELEPHONE (OUTPATIENT)
Dept: INTERNAL MEDICINE | Facility: CLINIC | Age: 67
End: 2022-12-23
Payer: MEDICARE

## 2022-12-23 NOTE — TELEPHONE ENCOUNTER
----- Message from Jes Nava sent at 12/23/2022 11:27 AM CST -----  Regarding: Refill  Who Called:DANIEL BURNETTE [[id number]]        Refill or New Rx: Refill        RX Name and Strength diazePAM (VALIUM) 2 MG tablet        Is this a 30 day or 90 day RX: 60        Preferred Pharmacy with phone number:[facility] - [place], [place] - [address]          Local or Mail Order: Local          Would the patient rather a call back or a response via [facility]? No        Best Call Back Number:[phone]        Additional Information:

## 2023-01-20 ENCOUNTER — OFFICE VISIT (OUTPATIENT)
Dept: UROGYNECOLOGY | Facility: CLINIC | Age: 68
End: 2023-01-20
Payer: MEDICARE

## 2023-01-20 VITALS
BODY MASS INDEX: 39.31 KG/M2 | HEIGHT: 62 IN | DIASTOLIC BLOOD PRESSURE: 70 MMHG | SYSTOLIC BLOOD PRESSURE: 140 MMHG | WEIGHT: 213.63 LBS

## 2023-01-20 DIAGNOSIS — N39.46 MIXED INCONTINENCE: Primary | ICD-10-CM

## 2023-01-20 DIAGNOSIS — L90.0 LICHEN SCLEROSUS: ICD-10-CM

## 2023-01-20 DIAGNOSIS — Z12.31 ENCOUNTER FOR SCREENING MAMMOGRAM FOR BREAST CANCER: ICD-10-CM

## 2023-01-20 DIAGNOSIS — R39.15 URINARY URGENCY: ICD-10-CM

## 2023-01-20 PROCEDURE — 1126F PR PAIN SEVERITY QUANTIFIED, NO PAIN PRESENT: ICD-10-PCS | Mod: CPTII,S$GLB,, | Performed by: NURSE PRACTITIONER

## 2023-01-20 PROCEDURE — 99213 OFFICE O/P EST LOW 20 MIN: CPT | Mod: S$GLB,,, | Performed by: NURSE PRACTITIONER

## 2023-01-20 PROCEDURE — 99999 PR PBB SHADOW E&M-EST. PATIENT-LVL IV: CPT | Mod: PBBFAC,,, | Performed by: NURSE PRACTITIONER

## 2023-01-20 PROCEDURE — 1101F PT FALLS ASSESS-DOCD LE1/YR: CPT | Mod: CPTII,S$GLB,, | Performed by: NURSE PRACTITIONER

## 2023-01-20 PROCEDURE — 99213 PR OFFICE/OUTPT VISIT, EST, LEVL III, 20-29 MIN: ICD-10-PCS | Mod: S$GLB,,, | Performed by: NURSE PRACTITIONER

## 2023-01-20 PROCEDURE — 1160F PR REVIEW ALL MEDS BY PRESCRIBER/CLIN PHARMACIST DOCUMENTED: ICD-10-PCS | Mod: CPTII,S$GLB,, | Performed by: NURSE PRACTITIONER

## 2023-01-20 PROCEDURE — 3077F PR MOST RECENT SYSTOLIC BLOOD PRESSURE >= 140 MM HG: ICD-10-PCS | Mod: CPTII,S$GLB,, | Performed by: NURSE PRACTITIONER

## 2023-01-20 PROCEDURE — 1126F AMNT PAIN NOTED NONE PRSNT: CPT | Mod: CPTII,S$GLB,, | Performed by: NURSE PRACTITIONER

## 2023-01-20 PROCEDURE — 3288F FALL RISK ASSESSMENT DOCD: CPT | Mod: CPTII,S$GLB,, | Performed by: NURSE PRACTITIONER

## 2023-01-20 PROCEDURE — 1160F RVW MEDS BY RX/DR IN RCRD: CPT | Mod: CPTII,S$GLB,, | Performed by: NURSE PRACTITIONER

## 2023-01-20 PROCEDURE — 3078F PR MOST RECENT DIASTOLIC BLOOD PRESSURE < 80 MM HG: ICD-10-PCS | Mod: CPTII,S$GLB,, | Performed by: NURSE PRACTITIONER

## 2023-01-20 PROCEDURE — 3077F SYST BP >= 140 MM HG: CPT | Mod: CPTII,S$GLB,, | Performed by: NURSE PRACTITIONER

## 2023-01-20 PROCEDURE — 1159F PR MEDICATION LIST DOCUMENTED IN MEDICAL RECORD: ICD-10-PCS | Mod: CPTII,S$GLB,, | Performed by: NURSE PRACTITIONER

## 2023-01-20 PROCEDURE — 99999 PR PBB SHADOW E&M-EST. PATIENT-LVL IV: ICD-10-PCS | Mod: PBBFAC,,, | Performed by: NURSE PRACTITIONER

## 2023-01-20 PROCEDURE — 3008F BODY MASS INDEX DOCD: CPT | Mod: CPTII,S$GLB,, | Performed by: NURSE PRACTITIONER

## 2023-01-20 PROCEDURE — 3008F PR BODY MASS INDEX (BMI) DOCUMENTED: ICD-10-PCS | Mod: CPTII,S$GLB,, | Performed by: NURSE PRACTITIONER

## 2023-01-20 PROCEDURE — 3078F DIAST BP <80 MM HG: CPT | Mod: CPTII,S$GLB,, | Performed by: NURSE PRACTITIONER

## 2023-01-20 PROCEDURE — 1101F PR PT FALLS ASSESS DOC 0-1 FALLS W/OUT INJ PAST YR: ICD-10-PCS | Mod: CPTII,S$GLB,, | Performed by: NURSE PRACTITIONER

## 2023-01-20 PROCEDURE — 3288F PR FALLS RISK ASSESSMENT DOCUMENTED: ICD-10-PCS | Mod: CPTII,S$GLB,, | Performed by: NURSE PRACTITIONER

## 2023-01-20 PROCEDURE — 1159F MED LIST DOCD IN RCRD: CPT | Mod: CPTII,S$GLB,, | Performed by: NURSE PRACTITIONER

## 2023-01-20 NOTE — PROGRESS NOTES
Urogyn follow up  01/20/2023  .  [facility] - UROGYNECOLOGY  [address] [address]    Lucas Mayer  [id number]  1955      Lucas Mayer is a 67 y.o. here for a urogyn follow up for urge incontinence.    HPI 67 Y O F  has a past medical history of Cataract, Depression, Hypertension, Other and unspecified hyperlipidemia, Trichiasis, and Vertigo.  Referred by Dr. Samuels  for evaluation of urinary incontinence. This has been an issue since 2000. She feels that the symptoms overall have worsened since her hysterectomy in 2000. She now changes pads 2/3 times per day has both stress and urge incontinence- especially at capacity.      10/20/2022  80 % improvement in urinary symptoms   No longer wears a pad during the day does wear one at night -wakes up at 5:30 in the am   Using the vaginal estrogen irregularly           Ohs Peq Urogyn Hpi     Question 6/15/2022 11:48 AM CDT - Filed by Abigail Spears MA   General Urogynecology: Are you experiencing the following?     Dysuria (painful urination) Yes   Nocturia:  waking up at night to empty your bladder  Yes   If you answered yes to the previous question, how many times does this happen per night? 1-2   Enuresis (urine loss during sleep) No   Dribbling urine after you urinate Yes   Hematuria (urine appears red) No   Type of stream Interrupted   Urinary frequency: How often a day are you going to the bathroom per day?  Less than 10   Urinary Tract Infections: How many Urinary Tract Infections have you had in the past year? I have not had a UTI in the past year   If you have had a UTI in the past year, what treatments have you had so far?  Cranberry supplementation     I have not had a UTI in the past year   Urinary Incontinence (General): Are you experiencing the following?     Past consultation for incontinence: Have you ever seen someone for the evaluation of incontinence? Yes   If you answered yes to the previous question,  "please select all the therapies you have tried.  [facility]     Pelvic floor physical therapy   Please note the effectiveness of the therapies. Somewhat effective   Need to wear protection to keep clothes dry  Yes   If you answered yes to the previous question, please eduardo the protection you use.  Panty liner   If you wear protection, how much wetness is typically on each pad? Slight   If you wear protection, how often do you have to change per day, if applicable?  2   Stress Symptoms: Are you experiencing the following?     Leakage of urine with cough, laugh and/or sneeze Yes   If you answered yes to the previous question, what is the frequency in days, weeks and/or months? Daily   Leakage of urine with sex No   Leakage of urine with bending/ lifting Yes   Leakage of urine with briskly walking or jogging Yes   If you lose urine for any other reason not previously mentioned, please note it below, if applicable.  just seeing a toilet will make make me have to go   Urge Symptoms: Are you experiencing the following?     Urgency ("got to go" feeling) Yes   Urge: How frequently do you feel an urge to urinate (feeling like you "gotta go" to the bathroom and can't wait) Daily   Do you experience a leakage of urine when you have a feeling of urgency?  Yes   Leakage of urine when unaware Yes   Past use of anticholinergics (medications used to treat overactive bladder) No   If you answered yes to the previous question, please eduardo the anticholinergics you have used:      Have you ever used Mirbetriq (aka Mirabegron)?  No   Prolapse Symptoms: Are you experiencing any of the following?      Falling out/ Bulging/ Heaviness in the vagina Yes   Vaginal/ Abdominal Pain/ Pressure Yes   Need to strain/ Push to void Yes   Need to wait on the toilet before you void Yes   Unusual position to urinate (using your hands to push back the vaginal bulge) No   Sensation of incomplete emptying Yes   Past use of pessary device No   If you answered " yes to the previous question, please list the devices you have used below.      Bowel Symptoms: Are you experiencing any of the following?     Constipation No   Diarrhea  Yes   Hematochezia (bloody stool) No   Incomplete evacuation of stool Yes   Involuntary loss of formed stool Yes   Fecal smearing/urgency Yes   Involuntary loss of gas Yes   Vaginal Symptoms: Are you experiencing any of the following?      Abnormal vaginal bleeding  No   Vaginal dryness No   Sexually active  No   Dyspareunia (painful intercourse) No   Estrogen use  No           Changes from last visit:  1.  Mixed urinary incontinence, urge > stress:   --has worsening incontinence/ frequency with vesicare twice daily  --stopped second dose  --voiding every 2 hours during the day   --nocturia 1/  night (early AM0  --using liner pad -- 1/ day  --did not go to pelvic PT-- only went for eval    --STRESS:  Pessary vs. Sling.       2. Fecal incontinence: improved   --using fiberwell and align  --still has some smearing 2-3 times/ week     3. Lichens Sclerosis   --using clobetasol intermittently     4. Vaginal atrophy (dryness):     --has not been using regularly    Past Medical History:   Diagnosis Date    Cataract     Depression     Hypertension     Other and unspecified hyperlipidemia     Trichiasis     Vertigo        Past Surgical History:   Procedure Laterality Date    CATARACT EXTRACTION W/  INTRAOCULAR LENS IMPLANT Left 12/03/2018    Dr Foreman     CATARACT EXTRACTION W/  INTRAOCULAR LENS IMPLANT Left 12/3/2018    Procedure: EXTRACTION, CATARACT, WITH IOL INSERTION;  Surgeon: Emilie Foreman MD;  Location: [facility];  Service: Ophthalmology;  Laterality: Left;    CATARACT EXTRACTION W/  INTRAOCULAR LENS IMPLANT Right 2/4/2019    Procedure: EXTRACTION, CATARACT, WITH IOL INSERTION;  Surgeon: Emilie Foreman MD;  Location: [facility] OR;  Service: Ophthalmology;  Laterality: Right;    HYSTERECTOMY  8-2001    fibroids- BK/BSO    OOPHORECTOMY      TONSILLECTOMY       WISDOM TOOTH EXTRACTION         Family History   Problem Relation Age of Onset    Diabetes Mother     Stroke Mother     Essential Tremor Mother     Melanoma Father     Hypertension Father     Essential Tremor Sister     Essential Tremor Sister     Breast cancer Neg Hx     Cancer Neg Hx     Eclampsia Neg Hx        Social History     Socioeconomic History    Marital status:    Tobacco Use    Smoking status: Never    Smokeless tobacco: Never   Substance and Sexual Activity    Alcohol use: Yes     Alcohol/week: 0.8 standard drinks     Types: 1 Standard drinks or equivalent per week     Comment: Social    Drug use: No    Sexual activity: Not Currently     Partners: Male       Current Outpatient Medications   Medication Sig Dispense Refill    amLODIPine (NORVASC) 5 MG tablet TAKE 1 TABLET BY MOUTH ONCE DAILY 90 tablet 2    atorvastatin (LIPITOR) 40 MG tablet TAKE 1 TABLET BY MOUTH ONCE DAILY 90 tablet 2    Bifidobacterium infantis (ALIGN ORAL) Take by mouth.      calcium citrate-vitamin D3 315-200 mg (CITRACAL+D) 315 mg-5 mcg (200 unit) per tablet Take 1 tablet by mouth 2 (two) times daily.      citalopram (CELEXA) 20 MG tablet TAKE 1 TABLET BY MOUTH ONCE DAILY 90 tablet 3    clobetasol 0.05% (TEMOVATE) 0.05 % Oint Apply topically 2 (two) times daily. 60 g 1    conjugated estrogens (PREMARIN) vaginal cream 1 g with applicator or dime-sized amt with finger in vagina nightly x 2 weeks, then twice a week thereafter 45 g 12    diazePAM (VALIUM) 2 MG tablet TAKE ONE TABLET BY MOUTH EVERY 8 HOURS as needed for anxiety 60 tablet 2    ketoconazole (NIZORAL) 2 % cream AAA bid 60 g 3    solifenacin (VESICARE) 5 MG tablet Take 1 tablet (5 mg total) by mouth 2 (two) times a day. 60 tablet 11    sulfamethoxazole-trimethoprim 800-160mg (BACTRIM DS) 800-160 mg Tab Take 1 tablet by mouth 2 (two) times daily. 6 tablet 0    VANIQA 13.9 % cream AAA bid 45 g 3     No current facility-administered medications for this visit.  "      Review of patient's allergies indicates:   Allergen Reactions    Ancef [cefazolin] Anaphylaxis    Codeine Nausea And Vomiting    Pcn [penicillins] Rash       Well woman:  Pap's: No history of abnormal, none recently   Mammo: BIRADS: 1; Last imaging  2021  Colonoscopy: N/a: normal   Dexa: Normal bone mineral density 2022    ROS:  As per HPI.      Exam  BP (!) 140/70 (BP Location: Left arm, Patient Position: Sitting, BP Method: Large (Manual))   Ht 5' 2" (1.575 m)   Wt 96.9 kg (213 lb 10 oz)   BMI 39.07 kg/m²   General: alert and oriented, no acute distress  Respiratory: normal respiratory effort  Abd: soft, non-tender, non-distended    Pelvic--deferred    Impression  1. Mixed incontinence        2. Urinary urgency        3. Lichen sclerosus        4. Encounter for screening mammogram for breast cancer  Mammo Digital Screening Bilat w/ Mo        We reviewed the above issues and discussed options for short-term versus long-term management of her problems.   Plan:   1.  Mixed urinary incontinence, urge > stress:   --Bladder diary for 3-7 days, write the number of times you go to the rest room and associated symptoms of urgency or leakage.   --Empty bladder every 3 hours.  Empty well: wait a minute, lean forward on toilet.    --Avoid dietary irritants (see sheet).  Keep diary x 3-5 days to determine your irritants.  --KEGELS: do 10 in AM and 10 in PM, holding each x 10 seconds.  When you feel urge to go, STOP, KEGEL, and when urge has passed, then go to bathroom.  Start pelvic floor  PT (wants to have the PT in [place]) .    --URGE:continue vesicare 5 mg daily  SE profile reviewed.   Takes 2-4 weeks to see if will have effect.  For dry mouth: get sour, sugar free lozenge or gum.    --STRESS:  Pessary vs. Sling.       2. Fecal incontinence: improved   --increase fiber supplement     3. Lichens Sclerosis   --use clobetasol as needed --twice weekly     4. Vaginal atrophy (dryness):   1 gram of estrogen cream in vagina " nightly twice a week    5. [facility] June 2023 for annual    I spent a total of 20 minutes on the day of the visit.  This includes face to face time and non-face to face time preparing to see the patient (eg, review of tests), obtaining and/or reviewing separately obtained history, documenting clinical information in the electronic or other health record, independently interpreting results and communicating results to the patient/family/caregiver, or care coordinator.       Preeti Arechiga, KENNETH-BC  [facility]  Division of Female Pelvic Medicine and Reconstructive Surgery  Department of Obstetrics & Gynecology

## 2023-01-20 NOTE — PATIENT INSTRUCTIONS
1.  Mixed urinary incontinence, urge > stress:   --Bladder diary for 3-7 days, write the number of times you go to the rest room and associated symptoms of urgency or leakage.   --Empty bladder every 3 hours.  Empty well: wait a minute, lean forward on toilet.    --Avoid dietary irritants (see sheet).  Keep diary x 3-5 days to determine your irritants.  --KEGELS: do 10 in AM and 10 in PM, holding each x 10 seconds.  When you feel urge to go, STOP, KEGEL, and when urge has passed, then go to bathroom.  Start pelvic floor  PT (wants to have the PT in [place]) .    --URGE:continue vesicare 5 mg daily  SE profile reviewed.   Takes 2-4 weeks to see if will have effect.  For dry mouth: get sour, sugar free lozenge or gum.    --STRESS:  Pessary vs. Sling.       2. Fecal incontinence: improved   --increase fiber supplement     3. Lichens Sclerosis   --use clobetasol as needed --twice weekly     4. Vaginal atrophy (dryness):   1 gram of estrogen cream in vagina nightly twice a week    5. RTC June 2023 for annual

## 2023-04-25 ENCOUNTER — CLINICAL SUPPORT (OUTPATIENT)
Dept: OPHTHALMOLOGY | Facility: CLINIC | Age: 68
End: 2023-04-25
Payer: MEDICARE

## 2023-04-25 ENCOUNTER — HOSPITAL ENCOUNTER (OUTPATIENT)
Dept: RADIOLOGY | Facility: HOSPITAL | Age: 68
Discharge: HOME | End: 2023-04-25
Attending: NURSE PRACTITIONER
Payer: MEDICARE

## 2023-04-25 ENCOUNTER — OFFICE VISIT (OUTPATIENT)
Dept: OPHTHALMOLOGY | Facility: CLINIC | Age: 68
End: 2023-04-25
Payer: MEDICARE

## 2023-04-25 DIAGNOSIS — H02.835 DERMATOCHALASIS OF BOTH LOWER EYELIDS: ICD-10-CM

## 2023-04-25 DIAGNOSIS — Z12.31 ENCOUNTER FOR SCREENING MAMMOGRAM FOR BREAST CANCER: ICD-10-CM

## 2023-04-25 DIAGNOSIS — H02.59 LAXITY OF EYELID: ICD-10-CM

## 2023-04-25 DIAGNOSIS — H02.832 DERMATOCHALASIS OF BOTH LOWER EYELIDS: ICD-10-CM

## 2023-04-25 DIAGNOSIS — H02.413 ACQUIRED MECHANICAL PTOSIS OF EYELID, BILATERAL: Primary | ICD-10-CM

## 2023-04-25 PROCEDURE — 1126F PR PAIN SEVERITY QUANTIFIED, NO PAIN PRESENT: ICD-10-PCS | Mod: CPTII,S$GLB,, | Performed by: OPHTHALMOLOGY

## 2023-04-25 PROCEDURE — 1160F RVW MEDS BY RX/DR IN RCRD: CPT | Mod: CPTII,S$GLB,, | Performed by: OPHTHALMOLOGY

## 2023-04-25 PROCEDURE — 77067 SCR MAMMO BI INCL CAD: CPT | Mod: TC

## 2023-04-25 PROCEDURE — 3288F FALL RISK ASSESSMENT DOCD: CPT | Mod: CPTII,S$GLB,, | Performed by: OPHTHALMOLOGY

## 2023-04-25 PROCEDURE — 92083 EXTENDED VISUAL FIELD XM: CPT | Mod: S$GLB,,, | Performed by: OPHTHALMOLOGY

## 2023-04-25 PROCEDURE — 3288F PR FALLS RISK ASSESSMENT DOCUMENTED: ICD-10-PCS | Mod: CPTII,S$GLB,, | Performed by: OPHTHALMOLOGY

## 2023-04-25 PROCEDURE — 99999 PR PBB SHADOW E&M-EST. PATIENT-LVL III: ICD-10-PCS | Mod: PBBFAC,,, | Performed by: OPHTHALMOLOGY

## 2023-04-25 PROCEDURE — 1101F PT FALLS ASSESS-DOCD LE1/YR: CPT | Mod: CPTII,S$GLB,, | Performed by: OPHTHALMOLOGY

## 2023-04-25 PROCEDURE — 1160F PR REVIEW ALL MEDS BY PRESCRIBER/CLIN PHARMACIST DOCUMENTED: ICD-10-PCS | Mod: CPTII,S$GLB,, | Performed by: OPHTHALMOLOGY

## 2023-04-25 PROCEDURE — 1126F AMNT PAIN NOTED NONE PRSNT: CPT | Mod: CPTII,S$GLB,, | Performed by: OPHTHALMOLOGY

## 2023-04-25 PROCEDURE — 1101F PR PT FALLS ASSESS DOC 0-1 FALLS W/OUT INJ PAST YR: ICD-10-PCS | Mod: CPTII,S$GLB,, | Performed by: OPHTHALMOLOGY

## 2023-04-25 PROCEDURE — 99204 PR OFFICE/OUTPT VISIT, NEW, LEVL IV, 45-59 MIN: ICD-10-PCS | Mod: S$GLB,,, | Performed by: OPHTHALMOLOGY

## 2023-04-25 PROCEDURE — 99204 OFFICE O/P NEW MOD 45 MIN: CPT | Mod: S$GLB,,, | Performed by: OPHTHALMOLOGY

## 2023-04-25 PROCEDURE — 1159F PR MEDICATION LIST DOCUMENTED IN MEDICAL RECORD: ICD-10-PCS | Mod: CPTII,S$GLB,, | Performed by: OPHTHALMOLOGY

## 2023-04-25 PROCEDURE — 77067 SCR MAMMO BI INCL CAD: CPT | Mod: 26,,, | Performed by: RADIOLOGY

## 2023-04-25 PROCEDURE — 99999 PR PBB SHADOW E&M-EST. PATIENT-LVL III: CPT | Mod: PBBFAC,,, | Performed by: OPHTHALMOLOGY

## 2023-04-25 PROCEDURE — 92083 GOLDMANN PERIMETRY - OU - EXTENDED - BOTH EYES: ICD-10-PCS | Mod: S$GLB,,, | Performed by: OPHTHALMOLOGY

## 2023-04-25 PROCEDURE — 77067 MAMMO DIGITAL SCREENING BILAT WITH TOMO: ICD-10-PCS | Mod: 26,,, | Performed by: RADIOLOGY

## 2023-04-25 PROCEDURE — 92285 EXTERNAL OCULAR PHOTOGRAPHY: CPT | Mod: S$GLB,,, | Performed by: OPHTHALMOLOGY

## 2023-04-25 PROCEDURE — 77063 BREAST TOMOSYNTHESIS BI: CPT | Mod: 26,,, | Performed by: RADIOLOGY

## 2023-04-25 PROCEDURE — 92285 EXTERNAL PHOTOGRAPHY - OU - BOTH EYES: ICD-10-PCS | Mod: S$GLB,,, | Performed by: OPHTHALMOLOGY

## 2023-04-25 PROCEDURE — 77063 MAMMO DIGITAL SCREENING BILAT WITH TOMO: ICD-10-PCS | Mod: 26,,, | Performed by: RADIOLOGY

## 2023-04-25 PROCEDURE — 1159F MED LIST DOCD IN RCRD: CPT | Mod: CPTII,S$GLB,, | Performed by: OPHTHALMOLOGY

## 2023-04-25 NOTE — PROGRESS NOTES
ROSANGELA Mayer is a/an 67 y.o. female here for ptosis/dermatochalasis  Referred by: Dr. Diana Zhang  How long have eyelid(s) been droopy? 5-10yrs worsening  Any h/o wearing hard CLs? Yes, short term   Do eyelids feel heavy? yes  Does the height of the eyelid(s) fluctuate throughout the day? yes  Do eyelid(s) interfere with daily activities such as driving, reading,   working? Yes mostly reading  Spontaneous orbital pain? yes  Orbital pain w eye movement? no  Red eyes? yes  Red eyelids? no  Eyelid swelling? No    Refresh PRN    Pt sts she has seen Dr. Davis in the past for excessive tearing and has   had plugs instilled in [facility] multiple times in the past. After Dr. Davis   becoming outside of her network she tried getting plugs with a different   provider, but plugs did not last as long. She has been just managing her   dry eyes with otc tears since then.   Pt also is bothered by drooping of both upper eyelids which have been   present for close to 10 yrs. Pt explains she has a long family hx of   dermatochalasis.     Last edited by Sydney Bullard on 4/25/2023  2:16 PM.            Assessment /Plan     For exam results, see Encounter Report.    Acquired mechanical ptosis of eyelid, bilateral    Dermatochalasis of both lower eyelids    Laxity of eyelid        The patient is a pleasant 67-year-old female here for evaluation of bilateral upper eyelid heaviness worse on the right than the left.  This has been progressive and affects activities of daily living.  This has been ongoing over the last 10 years.  The patient has a history of double vision which has corrected on its own.  She also has a history of dry eyes along with placement of punctal plugs in the past.  She has a history of bilateral cataract extraction with placement of posterior chamber intra-ocular lenses.  She does have a history of right upper eyelid resection and reconstruction for basal cell carcinoma.      On exam, the patient has chronic  frontalis use.  She has very minimal temporal brow ptosis bilaterally.  She has bilateral upper eyelid mechanical ptosis with skin lash touch.  She has bilateral lower eyelid dermatochalasis with moderate to severe herniation of orbital fat along with moderate lateral canthal laxity.      Pt. With significant improvement of superior visual fields with lids and brows taped vs. untaped.    These findings were discussed with the patient.      Recommend bilateral upper eyelid blepharoplasty in the minor procedure room with valium.     Option of cosmetic bilateral lower eyelid subtractive blepharoplasty along with pinch and canthopexy was discussed with the patient.  She will decide dependent upon the cost the procedure.      Informed consent obtained after extensive risks/benefits/alternatives were discussed with the patient including but not limited to pain, bleeding, infection, ocular injury, loss of the eye, asymmetry, need for revision in future, scarring.  Alternatives such as waiting were discussed.  All questions were answered.       Hold ASA, NSAIDS, and fish oil 5 to 7 days prior to procedure.     Return for surgery

## 2023-05-01 ENCOUNTER — TELEPHONE (OUTPATIENT)
Dept: OPHTHALMOLOGY | Facility: CLINIC | Age: 68
End: 2023-05-01
Payer: MEDICARE

## 2023-05-04 DIAGNOSIS — E78.5 HYPERLIPIDEMIA, UNSPECIFIED HYPERLIPIDEMIA TYPE: ICD-10-CM

## 2023-05-04 DIAGNOSIS — I10 BENIGN ESSENTIAL HYPERTENSION: ICD-10-CM

## 2023-05-04 RX ORDER — AMLODIPINE BESYLATE 5 MG/1
TABLET ORAL
Qty: 90 TABLET | Refills: 3 | Status: SHIPPED | OUTPATIENT
Start: 2023-05-04 | End: 2024-02-17

## 2023-05-04 RX ORDER — ATORVASTATIN CALCIUM 40 MG/1
TABLET, FILM COATED ORAL
Qty: 90 TABLET | Refills: 0 | Status: SHIPPED | OUTPATIENT
Start: 2023-05-04 | End: 2023-08-11

## 2023-05-04 NOTE — TELEPHONE ENCOUNTER
Care Due:                  Date            Visit Type   Department     Provider  --------------------------------------------------------------------------------                                EP -                              PRIMARY      [facility] INTERNAL  Last Visit: 05-      CARE ([facility])   MEDICINE       Jean Carlos Samuels  Next Visit: None Scheduled  None         None Found                                                            Last  Test          Frequency    Reason                     Performed    Due Date  --------------------------------------------------------------------------------    Office Visit  12 months..  atorvastatin, citalopram.  05- 05-    CMP.........  12 months..  atorvastatin.............  05- 05-    Lipid Panel.  12 months..  atorvastatin.............  05- 05-    [employer] Embedded Care Due Messages. Reference number: [id number].   5/04/2023 5:24:26 AM CDT

## 2023-05-04 NOTE — TELEPHONE ENCOUNTER
Refill Routing Note   Refill Routing Note   Medication(s) are not appropriate for processing by [facility] for the following reason(s):      Required vitals abnormal    [facility] action(s):  Approve  Defer Appointment due  Labs due     Medication Therapy Plan: BP 1/20/23 (!) 140/70  Medication reconciliation completed: No     Appointments  past 12m or future 3m with PCP    Date Provider   Last Visit   5/25/2022 Jean Carlos Samuels MD   Next Visit   Visit date not found Jean Carlos Samuels MD   ED visits in past 90 days: 0        Note composed:2:29 PM 05/04/2023

## 2023-05-08 ENCOUNTER — TELEPHONE (OUTPATIENT)
Dept: OPHTHALMOLOGY | Facility: CLINIC | Age: 68
End: 2023-05-08
Payer: MEDICARE

## 2023-05-08 NOTE — TELEPHONE ENCOUNTER
Called to [facility] procedure there was no answer a msg was left consent put to file after multiple failed attempts to contact pt

## 2023-05-19 DIAGNOSIS — M25.562 CHRONIC PAIN OF BOTH KNEES: Primary | ICD-10-CM

## 2023-05-19 DIAGNOSIS — M25.561 CHRONIC PAIN OF BOTH KNEES: Primary | ICD-10-CM

## 2023-05-19 DIAGNOSIS — G89.29 CHRONIC PAIN OF BOTH KNEES: Primary | ICD-10-CM

## 2023-05-24 ENCOUNTER — OFFICE VISIT (OUTPATIENT)
Dept: ORTHOPEDICS | Facility: CLINIC | Age: 68
End: 2023-05-24
Payer: MEDICARE

## 2023-05-24 ENCOUNTER — HOSPITAL ENCOUNTER (OUTPATIENT)
Dept: RADIOLOGY | Facility: HOSPITAL | Age: 68
Discharge: HOME | End: 2023-05-24
Attending: ORTHOPAEDIC SURGERY
Payer: MEDICARE

## 2023-05-24 VITALS — HEIGHT: 62 IN | BODY MASS INDEX: 39.23 KG/M2 | WEIGHT: 213.19 LBS | RESPIRATION RATE: 14 BRPM

## 2023-05-24 DIAGNOSIS — G89.29 BILATERAL CHRONIC KNEE PAIN: ICD-10-CM

## 2023-05-24 DIAGNOSIS — M71.20 BAKER CYST, UNSPECIFIED LATERALITY: ICD-10-CM

## 2023-05-24 DIAGNOSIS — M25.561 BILATERAL CHRONIC KNEE PAIN: ICD-10-CM

## 2023-05-24 DIAGNOSIS — M70.52 PES ANSERINUS BURSITIS OF BOTH KNEES: ICD-10-CM

## 2023-05-24 DIAGNOSIS — M25.562 BILATERAL CHRONIC KNEE PAIN: ICD-10-CM

## 2023-05-24 DIAGNOSIS — M25.561 CHRONIC PAIN OF BOTH KNEES: ICD-10-CM

## 2023-05-24 DIAGNOSIS — G89.29 CHRONIC PAIN OF BOTH KNEES: ICD-10-CM

## 2023-05-24 DIAGNOSIS — M17.0 PRIMARY OSTEOARTHRITIS OF BOTH KNEES: Primary | ICD-10-CM

## 2023-05-24 DIAGNOSIS — M70.51 PES ANSERINUS BURSITIS OF BOTH KNEES: ICD-10-CM

## 2023-05-24 DIAGNOSIS — M25.562 CHRONIC PAIN OF BOTH KNEES: ICD-10-CM

## 2023-05-24 PROCEDURE — 3288F FALL RISK ASSESSMENT DOCD: CPT | Mod: CPTII,S$GLB,, | Performed by: ORTHOPAEDIC SURGERY

## 2023-05-24 PROCEDURE — 3288F PR FALLS RISK ASSESSMENT DOCUMENTED: ICD-10-PCS | Mod: CPTII,S$GLB,, | Performed by: ORTHOPAEDIC SURGERY

## 2023-05-24 PROCEDURE — 99213 OFFICE O/P EST LOW 20 MIN: CPT | Mod: 25,S$GLB,, | Performed by: ORTHOPAEDIC SURGERY

## 2023-05-24 PROCEDURE — 99999 PR PBB SHADOW E&M-EST. PATIENT-LVL III: CPT | Mod: PBBFAC,,, | Performed by: ORTHOPAEDIC SURGERY

## 2023-05-24 PROCEDURE — 1100F PR PT FALLS ASSESS DOC 2+ FALLS/FALL W/INJURY/YR: ICD-10-PCS | Mod: CPTII,S$GLB,, | Performed by: ORTHOPAEDIC SURGERY

## 2023-05-24 PROCEDURE — 73562 X-RAY EXAM OF KNEE 3: CPT | Mod: TC,50

## 2023-05-24 PROCEDURE — 1100F PTFALLS ASSESS-DOCD GE2>/YR: CPT | Mod: CPTII,S$GLB,, | Performed by: ORTHOPAEDIC SURGERY

## 2023-05-24 PROCEDURE — 99213 PR OFFICE/OUTPT VISIT, EST, LEVL III, 20-29 MIN: ICD-10-PCS | Mod: 25,S$GLB,, | Performed by: ORTHOPAEDIC SURGERY

## 2023-05-24 PROCEDURE — 1159F PR MEDICATION LIST DOCUMENTED IN MEDICAL RECORD: ICD-10-PCS | Mod: CPTII,S$GLB,, | Performed by: ORTHOPAEDIC SURGERY

## 2023-05-24 PROCEDURE — 99999 PR PBB SHADOW E&M-EST. PATIENT-LVL III: ICD-10-PCS | Mod: PBBFAC,,, | Performed by: ORTHOPAEDIC SURGERY

## 2023-05-24 PROCEDURE — 20610 DRAIN/INJ JOINT/BURSA W/O US: CPT | Mod: 50,S$GLB,, | Performed by: ORTHOPAEDIC SURGERY

## 2023-05-24 PROCEDURE — 20610 LARGE JOINT ASPIRATION/INJECTION: BILATERAL KNEE: ICD-10-PCS | Mod: 50,S$GLB,, | Performed by: ORTHOPAEDIC SURGERY

## 2023-05-24 PROCEDURE — 3008F PR BODY MASS INDEX (BMI) DOCUMENTED: ICD-10-PCS | Mod: CPTII,S$GLB,, | Performed by: ORTHOPAEDIC SURGERY

## 2023-05-24 PROCEDURE — 73562 X-RAY EXAM OF KNEE 3: CPT | Mod: 26,50,, | Performed by: RADIOLOGY

## 2023-05-24 PROCEDURE — 3008F BODY MASS INDEX DOCD: CPT | Mod: CPTII,S$GLB,, | Performed by: ORTHOPAEDIC SURGERY

## 2023-05-24 PROCEDURE — 1125F AMNT PAIN NOTED PAIN PRSNT: CPT | Mod: CPTII,S$GLB,, | Performed by: ORTHOPAEDIC SURGERY

## 2023-05-24 PROCEDURE — 1125F PR PAIN SEVERITY QUANTIFIED, PAIN PRESENT: ICD-10-PCS | Mod: CPTII,S$GLB,, | Performed by: ORTHOPAEDIC SURGERY

## 2023-05-24 PROCEDURE — 1159F MED LIST DOCD IN RCRD: CPT | Mod: CPTII,S$GLB,, | Performed by: ORTHOPAEDIC SURGERY

## 2023-05-24 PROCEDURE — 73562 XR KNEE 3 VIEW BILATERAL: ICD-10-PCS | Mod: 26,50,, | Performed by: RADIOLOGY

## 2023-05-24 RX ORDER — TRIAMCINOLONE ACETONIDE 40 MG/ML
40 INJECTION, SUSPENSION INTRA-ARTICULAR; INTRAMUSCULAR
Status: DISCONTINUED | OUTPATIENT
Start: 2023-05-24 | End: 2023-05-24 | Stop reason: HOSPADM

## 2023-05-24 RX ADMIN — TRIAMCINOLONE ACETONIDE 40 MG: 40 INJECTION, SUSPENSION INTRA-ARTICULAR; INTRAMUSCULAR at 08:05

## 2023-05-24 NOTE — PROGRESS NOTES
Subjective:      Patient ID: Lucas Mayer is a 68 y.o. female.    Chief Complaint: Pain of the Left Knee and Pain of the Right Knee      HPI:  Ms. Mayer returns today with complaints of recurrent pain in both of her knees.  She was given steroid injections at her last visit on 10/12/2022 and had about 3 months of good relief of pain.  A proximally 3 weeks ago she had an exacerbation of her pain when she went to [place] and could not walk around due to the tenderness.  Walking increases her symptoms while ice and elevation decreases them.  She states she has joint [employer] fitness and has been doing her quadriceps and hamstring exercises which have been helping her.  Her left knee pain equals her right knee pain.  She gets swelling and giving way but denied locking.  Intermittently she will wear brace on her left knee but stated she is having trouble keeping the brace up on her right when she does wear the brace it helps.  She has taken NSAIDs without help.  She has not done physical therapy.    ROS:  No new diagnosis/surgery/prescriptions since last office visit on 10/12/2022.  Constitutional: Negative for chills and fever.   HENT:  Negative for congestion.    Eyes:  Negative for blurred vision and pain.   Cardiovascular:  Negative for chest pain and cyanosis.   Respiratory:  Negative for cough and shortness of breath.    Endocrine: Negative for cold intolerance and polydipsia.   Hematologic/Lymphatic: Negative for adenopathy.   Skin:  Positive for rash. Negative for flushing, itching and skin cancer.   Musculoskeletal:  Positive for falls, joint pain and joint swelling. Negative for gout.   Gastrointestinal:  Positive for heartburn. Negative for constipation and diarrhea.   Genitourinary:  Negative for nocturia.   Neurological:  Negative for headaches and seizures.   Psychiatric/Behavioral:  Positive for depression. Negative for substance abuse. The patient is nervous/anxious.    Allergic/Immunologic:  Positive for environmental allergies.       Objective:      Physical Exam:   General: AAOx3.  No acute distress  Vascular:  Pulses intact and equal bilaterally.  Capillary refill less than 3 seconds and equal bilaterally  Neurologic:  Pinprick and soft touch intact and equal bilaterally  Integment:  No ecchymosis, no errythema  Extremity:  Knee:  Extension/flexion equal bilaterally 0/118°.  Crepitus with motion both knees.  Mild effusion both knees.  Baker cyst, both knees.  Drop home positive both knees.  Negative patellar load/compression bilaterally.  Negative patella apprehension/relocation bilaterally.  Increased excursion with valgus stressing both knees.  Varus stressing equal bilaterally with endpoint.  Mild increased excursion with Lachman's/drawer both knees.  Positive joint line tenderness both knees.  Juvencio negative bilaterally.  Roebling positive bilaterally.  Tender at the anserine insertion both knees.  Swelling at the anserine insertion both knees.  Radiography:  Personally reviewed x-rays of both knees completed on 05/24/2023 showed tricompartmental arthritic changes with near bone-on-bone articulation and osteophytes.      Assessment:       Impression:     1. Tricompartmental arthritis, both knees.   2. Baker cyst, both knees.   3.   4.  Pes bursitis, both knees.    Chronic pain, both knees.         Plan:       1.  Discussed physical examination and radiographic findings with the patient. Lucas understands that she has advanced tricompartmental arthritis of both of her knees along with some mild pes anserine bursitis.  Treatment alternatives and outcomes were discussed with the patient she understands she could continue to be treated conservatively with observation, activity modification, NSAIDs, bracing, physical therapy, injections, or she could consider surgical intervention such as arthroscopy versus total knee arthroplasty.  She states she would like to continue with conservative  management a little bit longer.  2. Offered a steroid injection of both knees, she elected to proceed.  3. Recommend she purchase over-the-counter Voltaren gel and applied to the pes anserine insertion of both knees twice daily and massage in.  4. Recommend the patient purchase over-the-counter arch supports and placed them in her shoes to help realign her mechanical axis.    5. Brace wear was discussed with the patient she understands if the knee brace that she has will not stay up on her right knee she might want to try a neoprene sleeve brace which could give some support as well as compression.    6. Take NSAIDs as tolerated allowed by PCM.  7. Continue going to the gym and doing quadriceps and hamstring strengthening.  8. Follow up p.r.n..

## 2023-05-24 NOTE — PROCEDURES
Large Joint Aspiration/Injection: bilateral knee    Date/Time: 5/24/2023 8:30 AM  Performed by: Moreno Rodrigues DO  Authorized by: Moreno Rodrigues, DO     Consent Done?:  Yes (Verbal)  Indications:  Arthritis, diagnostic evaluation, joint swelling and pain  Site marked: the procedure site was marked    Timeout: prior to procedure the correct patient, procedure, and site was verified    Prep: patient was prepped and draped in usual sterile fashion      Details:  Needle Size:  22 G  Ultrasonic Guidance for needle placement?: No    Approach:  Anterolateral  Location:  Knee  Laterality:  Bilateral  Site:  Bilateral knee  Medications (Right):  40 mg triamcinolone acetonide 40 mg/mL  Medications (Left):  40 mg triamcinolone acetonide 40 mg/mL  Patient tolerance:  Patient tolerated the procedure well with no immediate complications

## 2023-05-29 ENCOUNTER — PATIENT MESSAGE (OUTPATIENT)
Dept: ORTHOPEDICS | Facility: CLINIC | Age: 68
End: 2023-05-29
Payer: MEDICARE

## 2023-06-07 ENCOUNTER — PATIENT OUTREACH (OUTPATIENT)
Dept: ADMINISTRATIVE | Facility: HOSPITAL | Age: 68
End: 2023-06-07
Payer: MEDICARE

## 2023-06-14 ENCOUNTER — TELEPHONE (OUTPATIENT)
Dept: OPHTHALMOLOGY | Facility: CLINIC | Age: 68
End: 2023-06-14
Payer: MEDICARE

## 2023-06-14 ENCOUNTER — TELEPHONE (OUTPATIENT)
Dept: INTERNAL MEDICINE | Facility: CLINIC | Age: 68
End: 2023-06-14
Payer: MEDICARE

## 2023-06-14 ENCOUNTER — PATIENT MESSAGE (OUTPATIENT)
Dept: INTERNAL MEDICINE | Facility: CLINIC | Age: 68
End: 2023-06-14
Payer: MEDICARE

## 2023-06-14 DIAGNOSIS — Z00.00 ANNUAL PHYSICAL EXAM: Primary | ICD-10-CM

## 2023-06-14 DIAGNOSIS — R79.9 ABNORMAL FINDING OF BLOOD CHEMISTRY, UNSPECIFIED: ICD-10-CM

## 2023-06-14 DIAGNOSIS — R23.4 CHANGES IN SKIN TEXTURE: ICD-10-CM

## 2023-06-14 NOTE — TELEPHONE ENCOUNTER
Returned pt call in regards to questions she had there was no answer a msg was left     ----- Message from Sydney Bullard sent at 6/13/2023  4:36 PM CDT -----  Regarding: FW: Questions    ----- Message -----  From: Paz Brito  Sent: 6/13/2023   4:04 PM CDT  To: Fiona Pittman Staff  Subject: Questions                                        Pt requesting to speak with Olive. They have questions about their procedure on 10/23.      Lucas @ [phone]

## 2023-06-14 NOTE — TELEPHONE ENCOUNTER
----- Message from Yoko Samaniego sent at 6/13/2023  3:51 PM CDT -----  Name of Who is Calling:DANIEL BURNETTE [[id number]]             What is the request in detail: Pt states she needs an order for a lab placed in the system. Please contact to further discuss and advise.              [facility] the [facility] by [facility] no             What Number to Call Back if not in [facility]:  [phone]

## 2023-06-21 ENCOUNTER — LAB VISIT (OUTPATIENT)
Dept: LAB | Facility: HOSPITAL | Age: 68
End: 2023-06-21
Attending: INTERNAL MEDICINE
Payer: MEDICARE

## 2023-06-21 ENCOUNTER — TELEPHONE (OUTPATIENT)
Dept: INTERNAL MEDICINE | Facility: CLINIC | Age: 68
End: 2023-06-21
Payer: MEDICARE

## 2023-06-21 ENCOUNTER — OFFICE VISIT (OUTPATIENT)
Dept: INTERNAL MEDICINE | Facility: CLINIC | Age: 68
End: 2023-06-21
Attending: INTERNAL MEDICINE
Payer: MEDICARE

## 2023-06-21 ENCOUNTER — PATIENT MESSAGE (OUTPATIENT)
Dept: INTERNAL MEDICINE | Facility: CLINIC | Age: 68
End: 2023-06-21

## 2023-06-21 VITALS
BODY MASS INDEX: 38.83 KG/M2 | WEIGHT: 211 LBS | DIASTOLIC BLOOD PRESSURE: 70 MMHG | OXYGEN SATURATION: 97 % | SYSTOLIC BLOOD PRESSURE: 128 MMHG | HEIGHT: 62 IN | HEART RATE: 75 BPM

## 2023-06-21 DIAGNOSIS — D22.9 NUMEROUS MOLES: Primary | ICD-10-CM

## 2023-06-21 DIAGNOSIS — F32.A DEPRESSION, UNSPECIFIED DEPRESSION TYPE: ICD-10-CM

## 2023-06-21 DIAGNOSIS — R23.4 CHANGES IN SKIN TEXTURE: ICD-10-CM

## 2023-06-21 DIAGNOSIS — I10 BENIGN HYPERTENSION: ICD-10-CM

## 2023-06-21 DIAGNOSIS — E78.00 PURE HYPERCHOLESTEROLEMIA: ICD-10-CM

## 2023-06-21 DIAGNOSIS — K76.0 FATTY LIVER: ICD-10-CM

## 2023-06-21 DIAGNOSIS — E66.9 OBESITY (BMI 35.0-39.9 WITHOUT COMORBIDITY): ICD-10-CM

## 2023-06-21 DIAGNOSIS — R79.9 ABNORMAL FINDING OF BLOOD CHEMISTRY, UNSPECIFIED: ICD-10-CM

## 2023-06-21 DIAGNOSIS — Z00.00 ANNUAL PHYSICAL EXAM: ICD-10-CM

## 2023-06-21 DIAGNOSIS — M79.89 LEG SWELLING: ICD-10-CM

## 2023-06-21 LAB
ALBUMIN SERPL BCP-MCNC: 4 G/DL (ref 3.5–5.2)
ALP SERPL-CCNC: 106 U/L (ref 55–135)
ALT SERPL W/O P-5'-P-CCNC: 30 U/L (ref 10–44)
ANION GAP SERPL CALC-SCNC: 10 MMOL/L (ref 8–16)
AST SERPL-CCNC: 21 U/L (ref 10–40)
BASOPHILS # BLD AUTO: 0.06 K/UL (ref 0–0.2)
BASOPHILS NFR BLD: 1 % (ref 0–1.9)
BILIRUB SERPL-MCNC: 0.5 MG/DL (ref 0.1–1)
BUN SERPL-MCNC: 15 MG/DL (ref 8–23)
CALCIUM SERPL-MCNC: 9.5 MG/DL (ref 8.7–10.5)
CHLORIDE SERPL-SCNC: 106 MMOL/L (ref 95–110)
CHOLEST SERPL-MCNC: 165 MG/DL (ref 120–199)
CHOLEST/HDLC SERPL: 3.4 {RATIO} (ref 2–5)
CO2 SERPL-SCNC: 25 MMOL/L (ref 23–29)
CREAT SERPL-MCNC: 0.8 MG/DL (ref 0.5–1.4)
DIFFERENTIAL METHOD: ABNORMAL
EOSINOPHIL # BLD AUTO: 0.3 K/UL (ref 0–0.5)
EOSINOPHIL NFR BLD: 5.1 % (ref 0–8)
ERYTHROCYTE [DISTWIDTH] IN BLOOD BY AUTOMATED COUNT: 13.3 % (ref 11.5–14.5)
EST. GFR  (NO RACE VARIABLE): >60 ML/MIN/1.73 M^2
ESTIMATED AVG GLUCOSE: 111 MG/DL (ref 68–131)
GLUCOSE SERPL-MCNC: 113 MG/DL (ref 70–110)
HBA1C MFR BLD: 5.5 % (ref 4–5.6)
HCT VFR BLD AUTO: 42 % (ref 37–48.5)
HDLC SERPL-MCNC: 48 MG/DL (ref 40–75)
HDLC SERPL: 29.1 % (ref 20–50)
HGB BLD-MCNC: 13.8 G/DL (ref 12–16)
IMM GRANULOCYTES # BLD AUTO: 0.02 K/UL (ref 0–0.04)
IMM GRANULOCYTES NFR BLD AUTO: 0.3 % (ref 0–0.5)
LDLC SERPL CALC-MCNC: 93.4 MG/DL (ref 63–159)
LYMPHOCYTES # BLD AUTO: 1.3 K/UL (ref 1–4.8)
LYMPHOCYTES NFR BLD: 20.6 % (ref 18–48)
MCH RBC QN AUTO: 29.1 PG (ref 27–31)
MCHC RBC AUTO-ENTMCNC: 32.9 G/DL (ref 32–36)
MCV RBC AUTO: 89 FL (ref 82–98)
MONOCYTES # BLD AUTO: 0.4 K/UL (ref 0.3–1)
MONOCYTES NFR BLD: 5.7 % (ref 4–15)
NEUTROPHILS # BLD AUTO: 4.1 K/UL (ref 1.8–7.7)
NEUTROPHILS NFR BLD: 67.3 % (ref 38–73)
NONHDLC SERPL-MCNC: 117 MG/DL
NRBC BLD-RTO: 0 /100 WBC
PLATELET # BLD AUTO: 238 K/UL (ref 150–450)
PMV BLD AUTO: 8.9 FL (ref 9.2–12.9)
POTASSIUM SERPL-SCNC: 4.4 MMOL/L (ref 3.5–5.1)
PROT SERPL-MCNC: 7 G/DL (ref 6–8.4)
RBC # BLD AUTO: 4.74 M/UL (ref 4–5.4)
SODIUM SERPL-SCNC: 141 MMOL/L (ref 136–145)
TRIGL SERPL-MCNC: 118 MG/DL (ref 30–150)
TSH SERPL DL<=0.005 MIU/L-ACNC: 2.52 UIU/ML (ref 0.4–4)
WBC # BLD AUTO: 6.13 K/UL (ref 3.9–12.7)

## 2023-06-21 PROCEDURE — 1126F AMNT PAIN NOTED NONE PRSNT: CPT | Mod: CPTII,S$GLB,, | Performed by: INTERNAL MEDICINE

## 2023-06-21 PROCEDURE — 85025 COMPLETE CBC W/AUTO DIFF WBC: CPT | Performed by: INTERNAL MEDICINE

## 2023-06-21 PROCEDURE — 36415 COLL VENOUS BLD VENIPUNCTURE: CPT | Performed by: INTERNAL MEDICINE

## 2023-06-21 PROCEDURE — 1100F PTFALLS ASSESS-DOCD GE2>/YR: CPT | Mod: CPTII,S$GLB,, | Performed by: INTERNAL MEDICINE

## 2023-06-21 PROCEDURE — 1160F PR REVIEW ALL MEDS BY PRESCRIBER/CLIN PHARMACIST DOCUMENTED: ICD-10-PCS | Mod: CPTII,S$GLB,, | Performed by: INTERNAL MEDICINE

## 2023-06-21 PROCEDURE — 80061 LIPID PANEL: CPT | Performed by: INTERNAL MEDICINE

## 2023-06-21 PROCEDURE — 3288F FALL RISK ASSESSMENT DOCD: CPT | Mod: CPTII,S$GLB,, | Performed by: INTERNAL MEDICINE

## 2023-06-21 PROCEDURE — 83036 HEMOGLOBIN GLYCOSYLATED A1C: CPT | Performed by: INTERNAL MEDICINE

## 2023-06-21 PROCEDURE — 3008F BODY MASS INDEX DOCD: CPT | Mod: CPTII,S$GLB,, | Performed by: INTERNAL MEDICINE

## 2023-06-21 PROCEDURE — 1160F RVW MEDS BY RX/DR IN RCRD: CPT | Mod: CPTII,S$GLB,, | Performed by: INTERNAL MEDICINE

## 2023-06-21 PROCEDURE — 3078F PR MOST RECENT DIASTOLIC BLOOD PRESSURE < 80 MM HG: ICD-10-PCS | Mod: CPTII,S$GLB,, | Performed by: INTERNAL MEDICINE

## 2023-06-21 PROCEDURE — 3288F PR FALLS RISK ASSESSMENT DOCUMENTED: ICD-10-PCS | Mod: CPTII,S$GLB,, | Performed by: INTERNAL MEDICINE

## 2023-06-21 PROCEDURE — 3044F HG A1C LEVEL LT 7.0%: CPT | Mod: CPTII,S$GLB,, | Performed by: INTERNAL MEDICINE

## 2023-06-21 PROCEDURE — 1100F PR PT FALLS ASSESS DOC 2+ FALLS/FALL W/INJURY/YR: ICD-10-PCS | Mod: CPTII,S$GLB,, | Performed by: INTERNAL MEDICINE

## 2023-06-21 PROCEDURE — 3074F SYST BP LT 130 MM HG: CPT | Mod: CPTII,S$GLB,, | Performed by: INTERNAL MEDICINE

## 2023-06-21 PROCEDURE — 99214 OFFICE O/P EST MOD 30 MIN: CPT | Mod: S$GLB,,, | Performed by: INTERNAL MEDICINE

## 2023-06-21 PROCEDURE — 3074F PR MOST RECENT SYSTOLIC BLOOD PRESSURE < 130 MM HG: ICD-10-PCS | Mod: CPTII,S$GLB,, | Performed by: INTERNAL MEDICINE

## 2023-06-21 PROCEDURE — 1159F PR MEDICATION LIST DOCUMENTED IN MEDICAL RECORD: ICD-10-PCS | Mod: CPTII,S$GLB,, | Performed by: INTERNAL MEDICINE

## 2023-06-21 PROCEDURE — 3008F PR BODY MASS INDEX (BMI) DOCUMENTED: ICD-10-PCS | Mod: CPTII,S$GLB,, | Performed by: INTERNAL MEDICINE

## 2023-06-21 PROCEDURE — 1126F PR PAIN SEVERITY QUANTIFIED, NO PAIN PRESENT: ICD-10-PCS | Mod: CPTII,S$GLB,, | Performed by: INTERNAL MEDICINE

## 2023-06-21 PROCEDURE — 80053 COMPREHEN METABOLIC PANEL: CPT | Performed by: INTERNAL MEDICINE

## 2023-06-21 PROCEDURE — 99999 PR PBB SHADOW E&M-EST. PATIENT-LVL IV: ICD-10-PCS | Mod: PBBFAC,,, | Performed by: INTERNAL MEDICINE

## 2023-06-21 PROCEDURE — 3044F PR MOST RECENT HEMOGLOBIN A1C LEVEL <7.0%: ICD-10-PCS | Mod: CPTII,S$GLB,, | Performed by: INTERNAL MEDICINE

## 2023-06-21 PROCEDURE — 84443 ASSAY THYROID STIM HORMONE: CPT | Performed by: INTERNAL MEDICINE

## 2023-06-21 PROCEDURE — 1159F MED LIST DOCD IN RCRD: CPT | Mod: CPTII,S$GLB,, | Performed by: INTERNAL MEDICINE

## 2023-06-21 PROCEDURE — 99214 PR OFFICE/OUTPT VISIT, EST, LEVL IV, 30-39 MIN: ICD-10-PCS | Mod: S$GLB,,, | Performed by: INTERNAL MEDICINE

## 2023-06-21 PROCEDURE — 99999 PR PBB SHADOW E&M-EST. PATIENT-LVL IV: CPT | Mod: PBBFAC,,, | Performed by: INTERNAL MEDICINE

## 2023-06-21 PROCEDURE — 3078F DIAST BP <80 MM HG: CPT | Mod: CPTII,S$GLB,, | Performed by: INTERNAL MEDICINE

## 2023-06-21 RX ORDER — FLUCONAZOLE 200 MG/1
200 TABLET ORAL
COMMUNITY
Start: 2023-04-22 | End: 2023-11-06

## 2023-06-21 RX ORDER — DIAZEPAM 2 MG/1
TABLET ORAL
Qty: 60 TABLET | Refills: 2 | Status: CANCELLED | OUTPATIENT
Start: 2023-06-21

## 2023-06-21 RX ORDER — DOXYCYCLINE 100 MG/1
100 CAPSULE ORAL 2 TIMES DAILY
COMMUNITY
Start: 2023-04-22 | End: 2023-11-06

## 2023-06-21 RX ORDER — LORATADINE 10 MG/1
10 TABLET ORAL
COMMUNITY
Start: 2023-04-18 | End: 2023-11-06

## 2023-06-21 NOTE — PROGRESS NOTES
Subjective:       Patient ID: Lucas Mayer is a 68 y.o. female.    Chief Complaint: Annual Exam    Here for annual exam    66 yo F with PMHx of Moderate MORALES, Severe REM related MORALES, severe snoring (rec ENT eval) and PLMS without arousals     Has appt with urogyn-    Has numerous moles that need investigation and routine skin check    Due for RUQ u/s in setting of NAFLD.    Some bilateral leg swelling. Denies CP at rest or with exertion, dizziness with exertion, shortness of breath out of proportion to level of activity, frequent or sustained palpitations, orthopnea, PND.         ### Vertigo ###  She was placed on low dose valium 2mg BID with an 80% reduction in her symptoms. Symptoms remain well controlled. She will still get dizzy when lying flat or driving over bridges. She must walk on edge of hallways to minimize trigger     ### Bilateral knee pain ###  -frequent cracking and occasional pain. Not debilitating. Overall stable. 75lb dog hit her right knee and it has been improved since. Now her left leg is more bothersome.     5/25/22 worsened.        ### HTN ###  Amlodipine 5mg  Denies frequent or current HA, intermittent blurry vision, dizziness, CP, or SOB.  [facility]      ### MORALES ###  Moderate MORALES, Severe REM related MORALES, severe snoring (rec ENT eval) and PLMS without arousals  CPAP titration scheduled for today 12/16/19  Tolerating CPAP from [facility]        Review of Systems   Constitutional:  Negative for chills, fatigue, fever and unexpected weight change.   HENT:  Negative for ear pain, hearing loss, postnasal drip, tinnitus, trouble swallowing and voice change.    Respiratory:  Negative for cough, chest tightness, shortness of breath and wheezing.    Cardiovascular:  Negative for chest pain, palpitations and leg swelling.   Gastrointestinal:  Negative for abdominal pain, blood in stool, diarrhea, nausea and vomiting.   Endocrine: Negative for  "polydipsia, polyphagia and polyuria.   Genitourinary:  Negative for difficulty urinating, dysuria, hematuria and vaginal bleeding.   Skin:  Negative for rash.   Allergic/Immunologic: Negative for food allergies.   Neurological:  Positive for dizziness. Negative for numbness and headaches.   Hematological:  Does not bruise/bleed easily.   Psychiatric/Behavioral:  The patient is not nervous/anxious.      Objective:      Vitals:    06/21/23 1344   BP: 128/70   Pulse: 75   SpO2: 97%   Weight: 95.7 kg (210 lb 15.7 oz)   Height: 5' 2" (1.575 m)      Physical Exam  Vitals and nursing note reviewed.   Constitutional:       General: She is not in acute distress.     Appearance: Normal appearance. She is well-developed.   HENT:      Head: Normocephalic and atraumatic.      Mouth/Throat:      Pharynx: No oropharyngeal exudate.   Eyes:      General: No scleral icterus.     Conjunctiva/sclera: Conjunctivae normal.      Pupils: Pupils are equal, round, and reactive to light.   Neck:      Thyroid: No thyromegaly.   Cardiovascular:      Rate and Rhythm: Normal rate and regular rhythm.      Heart sounds: Normal heart sounds. No murmur heard.  Pulmonary:      Effort: Pulmonary effort is normal.      Breath sounds: Normal breath sounds. No wheezing or rales.   Abdominal:      General: There is no distension.   Musculoskeletal:         General: No tenderness.      Right lower leg: Edema present.      Left lower leg: Edema present.   Lymphadenopathy:      Cervical: No cervical adenopathy.   Skin:     General: Skin is warm and dry.   Neurological:      Mental Status: She is alert and oriented to person, place, and time.   Psychiatric:         Behavior: Behavior normal.       Assessment:       1. Numerous moles    2. Depression, unspecified depression type    3. Benign hypertension    4. Pure hypercholesterolemia    5. Obesity (BMI 35.0-39.9 without comorbidity)    6. Fatty liver    7. Leg swelling        Plan:       Lucas was seen today " for annual exam.    Diagnoses and all orders for this visit:    Numerous moles  -     Ambulatory referral/consult to Dermatology; Future    Depression, unspecified depression type   Controlled and asymptomatic.  Continue current Rx regimen.    Benign hypertension   Controlled and asymptomatic.  Continue current Rx regimen.    Pure hypercholesterolemia   Tolerating statin. Continue this.     Obesity (BMI 35.0-39.9 without comorbidity)   Patient should eat food, not too much, and most should be vegetables and lean protein. Discussed goal of weight loss to be accomplished by calorie restriction to approx 0317-3792 calories/day. Cumulative psychical activity throughout your day does increase weight loss.  Vary sources in diet (ie different colored vegetables) along with adequate fiber discussed. Consistent and sustainable practices are key. Will review diet periodically to scan for potential for vitamin deficiencies.   Discussed a minimum exercise goal of moderate paced walking or similar level of activity for 30-45 consecutive minutes 4-5 times a week for cardiovascular benefit and overall reduction in morbidity and mortality.    Fatty liver  -     US Abdomen Limited; Future    Leg swelling  -     Echo           Jean Carlos Strickland MD  Internal Medicine-[facility]        Side effects of medication(s) were discussed in detail and patient voiced understanding.  Patient will call back for any issues or complications.

## 2023-06-21 NOTE — LETTER
June 21, 2023    Lucas Mayer  [address] [place] [address]             [facility]  [address]  [place] [address]  Phone: [phone]  Fax: [phone] Hello,  Please call us to schedule your US Abdomen Limited (ultrasound of the liver) and your echo appointments at your preferred facilities.    Thanks for choosing [facility]!    CARINE Holder

## 2023-06-21 NOTE — TELEPHONE ENCOUNTER
Sent reminder letter below:    Letter by Jean Carlos Samuels MD on 6/21/2023 June 21, 2023     Lucas Mayer  [address] [place] [address] [address] [place] [address]                  [facility]  [facility]  [place] [address]  Phone: [phone]  Fax: [phone] Hello,  Please call us to schedule your US Abdomen Limited (ultrasound of the liver) and your echo appointments at your preferred facilities.     Thanks for choosing [facility]!     CARINE Holder         RE: Lucas Mayer -- MR#: [id number]  Page 1  of 1

## 2023-06-22 DIAGNOSIS — F32.A DEPRESSION, UNSPECIFIED DEPRESSION TYPE: ICD-10-CM

## 2023-06-22 NOTE — TELEPHONE ENCOUNTER
No care due was identified.  [facility] Embedded Care Due Messages. Reference number: [id number].   6/22/2023 6:55:39 PM CDT

## 2023-06-23 RX ORDER — DIAZEPAM 2 MG/1
2 TABLET ORAL EVERY 8 HOURS PRN
Qty: 60 TABLET | Refills: 2 | Status: SHIPPED | OUTPATIENT
Start: 2023-06-23 | End: 2023-09-08

## 2023-06-30 ENCOUNTER — HOSPITAL ENCOUNTER (OUTPATIENT)
Dept: RADIOLOGY | Facility: HOSPITAL | Age: 68
Discharge: HOME | End: 2023-06-30
Attending: INTERNAL MEDICINE
Payer: MEDICARE

## 2023-06-30 DIAGNOSIS — K76.0 FATTY LIVER: ICD-10-CM

## 2023-06-30 PROCEDURE — 76705 ECHO EXAM OF ABDOMEN: CPT | Mod: TC

## 2023-06-30 PROCEDURE — 76705 ECHO EXAM OF ABDOMEN: CPT | Mod: 26,,, | Performed by: RADIOLOGY

## 2023-06-30 PROCEDURE — 76705 US ABDOMEN LIMITED: ICD-10-PCS | Mod: 26,,, | Performed by: RADIOLOGY

## 2023-07-05 ENCOUNTER — TELEPHONE (OUTPATIENT)
Dept: INTERNAL MEDICINE | Facility: CLINIC | Age: 68
End: 2023-07-05
Payer: MEDICARE

## 2023-07-05 NOTE — TELEPHONE ENCOUNTER
Faxed THE FOLLOWING BELOW:  -LOV WITH DR. HYDE 6/21/2023  -XRAYS 5/19/2023, 9/28/2022  -PT NOTES 5/24/2021, 5/20/2021(MED LIST DOSAGE/DURATION INCLUDED IN THESE NOTES)  fax# [phone]/office# [phone]. I have attached a confirmation sheet to this fax located in my file cabinet. Fax will be sent to scan after 3 months!    Kept in file cabinet just in case needed to be sent again

## 2023-07-13 ENCOUNTER — HOSPITAL ENCOUNTER (OUTPATIENT)
Dept: CARDIOLOGY | Facility: HOSPITAL | Age: 68
Discharge: HOME | End: 2023-07-13
Attending: INTERNAL MEDICINE
Payer: MEDICARE

## 2023-07-13 VITALS — HEIGHT: 62 IN | BODY MASS INDEX: 38.64 KG/M2 | WEIGHT: 210 LBS

## 2023-07-13 LAB
AORTIC ROOT ANNULUS: 3.54 CM
AORTIC VALVE CUSP SEPERATION: 2.04 CM
AV INDEX (PROSTH): 0.93
AV MEAN GRADIENT: 4 MMHG
AV PEAK GRADIENT: 8 MMHG
AV VALVE AREA: 2.64 CM2
AV VELOCITY RATIO: 0.8
BSA FOR ECHO PROCEDURE: 2.04 M2
CV ECHO LV RWT: 0.55 CM
DOP CALC AO PEAK VEL: 1.4 M/S
DOP CALC AO VTI: 28.7 CM
DOP CALC LVOT AREA: 2.8 CM2
DOP CALC LVOT DIAMETER: 1.9 CM
DOP CALC LVOT PEAK VEL: 1.12 M/S
DOP CALC LVOT STROKE VOLUME: 75.66 CM3
DOP CALC MV VTI: 10.9 CM
DOP CALCLVOT PEAK VEL VTI: 26.7 CM
E WAVE DECELERATION TIME: 168.78 MSEC
E/A RATIO: 0.67
ECHO LV POSTERIOR WALL: 1.24 CM (ref 0.6–1.1)
EJECTION FRACTION: 68 %
FRACTIONAL SHORTENING: 28 % (ref 28–44)
INTERVENTRICULAR SEPTUM: 1.1 CM (ref 0.6–1.1)
LEFT ATRIUM SIZE: 4.33 CM
LEFT INTERNAL DIMENSION IN SYSTOLE: 3.24 CM (ref 2.1–4)
LEFT VENTRICLE DIASTOLIC VOLUME INDEX: 48.08 ML/M2
LEFT VENTRICLE DIASTOLIC VOLUME: 93.75 ML
LEFT VENTRICLE MASS INDEX: 99 G/M2
LEFT VENTRICLE SYSTOLIC VOLUME INDEX: 21.6 ML/M2
LEFT VENTRICLE SYSTOLIC VOLUME: 42.1 ML
LEFT VENTRICULAR INTERNAL DIMENSION IN DIASTOLE: 4.53 CM (ref 3.5–6)
LEFT VENTRICULAR MASS: 193.03 G
LVOT MG: 2.63 MMHG
LVOT MV: 0.76 CM/S
MV MEAN GRADIENT: 1 MMHG
MV PEAK A VEL: 0.69 M/S
MV PEAK E VEL: 0.46 M/S
MV PEAK GRADIENT: 2 MMHG
MV STENOSIS PRESSURE HALF TIME: 55.07 MS
MV VALVE AREA BY CONTINUITY EQUATION: 6.94 CM2
MV VALVE AREA P 1/2 METHOD: 3.99 CM2
PISA TR MAX VEL: 1.33 M/S
PV MV: 0.72 M/S
PV PEAK VELOCITY: 0.96 CM/S
RA PRESSURE: 3 MMHG
RIGHT VENTRICULAR END-DIASTOLIC DIMENSION: 2.66 CM
TR MAX PG: 7 MMHG
TV REST PULMONARY ARTERY PRESSURE: 10 MMHG

## 2023-07-13 PROCEDURE — C8929 TTE W OR WO FOL WCON,DOPPLER: HCPCS

## 2023-07-13 PROCEDURE — 93306 TTE W/DOPPLER COMPLETE: CPT | Mod: 26,,, | Performed by: INTERNAL MEDICINE

## 2023-07-13 PROCEDURE — 93306 ECHO (CUPID ONLY): ICD-10-PCS | Mod: 26,,, | Performed by: INTERNAL MEDICINE

## 2023-07-13 PROCEDURE — 25500020 PHARM REV CODE 255: Performed by: INTERNAL MEDICINE

## 2023-07-13 RX ADMIN — SULFUR HEXAFLUORIDE 5 ML: KIT at 10:07

## 2023-07-24 NOTE — PROGRESS NOTES
The ultrasound of your liver did not reveal and evidence of cirrhosis or masses.  The findings are consistent with steatosis, or fatty infiltration of the liver.  I recommend a low cholesterol diet and weight loss to help halt progression.  We will continue to monitor your liver enzymes with blood work at least once a year. If left untreated NAFLD- or Non alcoholic fatty liver disease can progress to liver cirrhosis, failure, and even cancer.    If you get frequent pains over there that lone gallstone might be giving your trouble.    Respectfully,  Jean Carlos Strickland

## 2023-08-11 DIAGNOSIS — E78.5 HYPERLIPIDEMIA, UNSPECIFIED HYPERLIPIDEMIA TYPE: ICD-10-CM

## 2023-08-11 RX ORDER — ATORVASTATIN CALCIUM 40 MG/1
TABLET, FILM COATED ORAL
Qty: 90 TABLET | Refills: 3 | Status: SHIPPED | OUTPATIENT
Start: 2023-08-11 | End: 2023-10-22 | Stop reason: SDUPTHER

## 2023-08-11 NOTE — TELEPHONE ENCOUNTER
No care due was identified.  [facility] Embedded Care Due Messages. Reference number: [id number].   8/11/2023 4:12:49 AM CDT

## 2023-08-11 NOTE — TELEPHONE ENCOUNTER
Refill Decision Note   Lucas Moreno  is requesting a refill authorization.  Brief Assessment and Rationale for Refill:  Approve     Medication Therapy Plan:         Comments:     Note composed:10:45 AM 08/11/2023

## 2023-08-15 ENCOUNTER — TELEPHONE (OUTPATIENT)
Dept: DERMATOLOGY | Facility: CLINIC | Age: 68
End: 2023-08-15
Payer: MEDICARE

## 2023-08-15 NOTE — TELEPHONE ENCOUNTER
Pt contacted and notified that there were no sooner NP appts and verified that she was on the wait list.    Statement Selected

## 2023-08-20 ENCOUNTER — PATIENT MESSAGE (OUTPATIENT)
Dept: OPHTHALMOLOGY | Facility: CLINIC | Age: 68
End: 2023-08-20
Payer: MEDICARE

## 2023-09-06 DIAGNOSIS — F32.A DEPRESSION, UNSPECIFIED DEPRESSION TYPE: ICD-10-CM

## 2023-09-06 NOTE — TELEPHONE ENCOUNTER
No care due was identified.  [facility] Embedded Care Due Messages. Reference number: [id number].   9/06/2023 1:44:30 PM CDT

## 2023-09-07 NOTE — TELEPHONE ENCOUNTER
Patient has been contacted in regards to medication refill request. Patient informed 3 month follow up is needed to process medication request. Appointment has been offered and confirmed for 11/2/2023 at 1040 AM.

## 2023-09-08 RX ORDER — DIAZEPAM 2 MG/1
2 TABLET ORAL EVERY 8 HOURS PRN
Qty: 60 TABLET | Refills: 2 | Status: SHIPPED | OUTPATIENT
Start: 2023-09-08 | End: 2023-12-04 | Stop reason: SDUPTHER

## 2023-09-25 DIAGNOSIS — F32.A DEPRESSION, UNSPECIFIED DEPRESSION TYPE: ICD-10-CM

## 2023-09-25 RX ORDER — CITALOPRAM 20 MG/1
20 TABLET, FILM COATED ORAL DAILY
Qty: 90 TABLET | Refills: 3 | Status: SHIPPED | OUTPATIENT
Start: 2023-09-25

## 2023-09-25 NOTE — TELEPHONE ENCOUNTER
No care due was identified.  [facility] Embedded Care Due Messages. Reference number: [id number].   9/25/2023 10:21:40 AM CDT

## 2023-09-27 ENCOUNTER — OFFICE VISIT (OUTPATIENT)
Dept: ORTHOPEDICS | Facility: CLINIC | Age: 68
End: 2023-09-27
Payer: MEDICARE

## 2023-09-27 VITALS
WEIGHT: 210 LBS | SYSTOLIC BLOOD PRESSURE: 125 MMHG | HEIGHT: 62 IN | HEART RATE: 68 BPM | OXYGEN SATURATION: 96 % | DIASTOLIC BLOOD PRESSURE: 80 MMHG | BODY MASS INDEX: 38.64 KG/M2

## 2023-09-27 DIAGNOSIS — M23.51 CHRONIC INSTABILITY OF RIGHT KNEE: ICD-10-CM

## 2023-09-27 DIAGNOSIS — M23.52 CHRONIC INSTABILITY OF LEFT KNEE: ICD-10-CM

## 2023-09-27 DIAGNOSIS — M70.52 PES ANSERINUS BURSITIS OF BOTH KNEES: Primary | ICD-10-CM

## 2023-09-27 DIAGNOSIS — M17.0 PRIMARY OSTEOARTHRITIS OF BOTH KNEES: ICD-10-CM

## 2023-09-27 DIAGNOSIS — M70.51 PES ANSERINUS BURSITIS OF BOTH KNEES: Primary | ICD-10-CM

## 2023-09-27 DIAGNOSIS — M25.562 BILATERAL CHRONIC KNEE PAIN: ICD-10-CM

## 2023-09-27 DIAGNOSIS — M25.561 BILATERAL CHRONIC KNEE PAIN: ICD-10-CM

## 2023-09-27 DIAGNOSIS — M71.20 BAKER CYST, UNSPECIFIED LATERALITY: ICD-10-CM

## 2023-09-27 DIAGNOSIS — G89.29 BILATERAL CHRONIC KNEE PAIN: ICD-10-CM

## 2023-09-27 PROCEDURE — 99213 PR OFFICE/OUTPT VISIT, EST, LEVL III, 20-29 MIN: ICD-10-PCS | Mod: 25,S$GLB,, | Performed by: ORTHOPAEDIC SURGERY

## 2023-09-27 PROCEDURE — 1125F AMNT PAIN NOTED PAIN PRSNT: CPT | Mod: CPTII,S$GLB,, | Performed by: ORTHOPAEDIC SURGERY

## 2023-09-27 PROCEDURE — 3008F BODY MASS INDEX DOCD: CPT | Mod: CPTII,S$GLB,, | Performed by: ORTHOPAEDIC SURGERY

## 2023-09-27 PROCEDURE — 3288F FALL RISK ASSESSMENT DOCD: CPT | Mod: CPTII,S$GLB,, | Performed by: ORTHOPAEDIC SURGERY

## 2023-09-27 PROCEDURE — 1125F PR PAIN SEVERITY QUANTIFIED, PAIN PRESENT: ICD-10-PCS | Mod: CPTII,S$GLB,, | Performed by: ORTHOPAEDIC SURGERY

## 2023-09-27 PROCEDURE — 99999 PR PBB SHADOW E&M-EST. PATIENT-LVL III: ICD-10-PCS | Mod: PBBFAC,,, | Performed by: ORTHOPAEDIC SURGERY

## 2023-09-27 PROCEDURE — 3008F PR BODY MASS INDEX (BMI) DOCUMENTED: ICD-10-PCS | Mod: CPTII,S$GLB,, | Performed by: ORTHOPAEDIC SURGERY

## 2023-09-27 PROCEDURE — 1159F PR MEDICATION LIST DOCUMENTED IN MEDICAL RECORD: ICD-10-PCS | Mod: CPTII,S$GLB,, | Performed by: ORTHOPAEDIC SURGERY

## 2023-09-27 PROCEDURE — 20610 DRAIN/INJ JOINT/BURSA W/O US: CPT | Mod: 50,S$GLB,, | Performed by: ORTHOPAEDIC SURGERY

## 2023-09-27 PROCEDURE — 3044F PR MOST RECENT HEMOGLOBIN A1C LEVEL <7.0%: ICD-10-PCS | Mod: CPTII,S$GLB,, | Performed by: ORTHOPAEDIC SURGERY

## 2023-09-27 PROCEDURE — 3288F PR FALLS RISK ASSESSMENT DOCUMENTED: ICD-10-PCS | Mod: CPTII,S$GLB,, | Performed by: ORTHOPAEDIC SURGERY

## 2023-09-27 PROCEDURE — 99213 OFFICE O/P EST LOW 20 MIN: CPT | Mod: 25,S$GLB,, | Performed by: ORTHOPAEDIC SURGERY

## 2023-09-27 PROCEDURE — 99999 PR PBB SHADOW E&M-EST. PATIENT-LVL III: CPT | Mod: PBBFAC,,, | Performed by: ORTHOPAEDIC SURGERY

## 2023-09-27 PROCEDURE — 1159F MED LIST DOCD IN RCRD: CPT | Mod: CPTII,S$GLB,, | Performed by: ORTHOPAEDIC SURGERY

## 2023-09-27 PROCEDURE — 3044F HG A1C LEVEL LT 7.0%: CPT | Mod: CPTII,S$GLB,, | Performed by: ORTHOPAEDIC SURGERY

## 2023-09-27 PROCEDURE — 1101F PR PT FALLS ASSESS DOC 0-1 FALLS W/OUT INJ PAST YR: ICD-10-PCS | Mod: CPTII,S$GLB,, | Performed by: ORTHOPAEDIC SURGERY

## 2023-09-27 PROCEDURE — 20610 LARGE JOINT ASPIRATION/INJECTION: BILATERAL ANSERINE BURSA: ICD-10-PCS | Mod: 50,S$GLB,, | Performed by: ORTHOPAEDIC SURGERY

## 2023-09-27 PROCEDURE — 1101F PT FALLS ASSESS-DOCD LE1/YR: CPT | Mod: CPTII,S$GLB,, | Performed by: ORTHOPAEDIC SURGERY

## 2023-09-27 RX ORDER — TRIAMCINOLONE ACETONIDE 40 MG/ML
40 INJECTION, SUSPENSION INTRA-ARTICULAR; INTRAMUSCULAR
Status: DISCONTINUED | OUTPATIENT
Start: 2023-09-27 | End: 2023-09-27 | Stop reason: HOSPADM

## 2023-09-27 RX ADMIN — TRIAMCINOLONE ACETONIDE 40 MG: 40 INJECTION, SUSPENSION INTRA-ARTICULAR; INTRAMUSCULAR at 02:09

## 2023-09-27 NOTE — PROGRESS NOTES
Patient ID: Lucas Mayer is a 68 y.o. female.     Chief Complaint: Pain of the Left Knee and Pain of the Right Knee        HPI:  Ms. Mayer returned today evaluation of both of her knees.  Her right knee is worse than her left.  Pain in both of her knees.  At her last visit on 05/24/2023 she was given steroid injections which did not resolve her symptoms..  Her knee pain began to worsen about 2-3 weeks ago to the point she wanted to be seen again.  She has lost about 13 lb which has helped.  She has braces but they no longer fit her and slide down her legs so she does not wear them.  Walking increases her symptoms while ice and elevation decreases them.  She purchase arch supports which helped.. She gets swelling and giving way but denied locking.  She has taken NSAIDs without help.  She has not done physical therapy.     ROS:  No new diagnosis/surgery/prescriptions since last office visit on 05/24/2023  Constitutional: Negative for chills and fever.   HENT:  Negative for congestion.    Eyes:  Negative for blurred vision and pain.   Cardiovascular:  Negative for chest pain and cyanosis.   Respiratory:  Negative for cough and shortness of breath.    Endocrine: Negative for cold intolerance and polydipsia.   Hematologic/Lymphatic: Negative for adenopathy.   Skin:  Positive for rash. Negative for flushing, itching and skin cancer.   Musculoskeletal:  Positive for falls, joint pain and joint swelling. Negative for gout.   Gastrointestinal:  Positive for heartburn. Negative for constipation and diarrhea.   Genitourinary:  Negative for nocturia.   Neurological:  Negative for headaches and seizures.   Psychiatric/Behavioral:  Positive for depression. Negative for substance abuse. The patient is nervous/anxious.    Allergic/Immunologic: Positive for environmental allergies.         Objective:      Physical Exam:   General: AAOx3.  No acute distress  Vascular:  Pulses intact and equal bilaterally.  Capillary  refill less than 3 seconds and equal bilaterally  Neurologic:  Pinprick and soft touch intact and equal bilaterally  Integment:  No ecchymosis, no errythema  Extremity:  Knee:  Extension/flexion equal bilaterally 0/118°.  Crepitus with motion both knees. Baker cyst, both knees.  Drop home negative both knees.  Negative patellar load/compression bilaterally.  Negative patella apprehension/relocation bilaterally.  Increased excursion with valgus stressing both knees.  Varus stressing equal bilaterally with endpoint.  Mild increased excursion with Lachman's/drawer both knees.  Mild joint line tenderness both knees.  Juvencio negative bilaterally.  Inge relatively negative both knees.  Tender at the anserine insertion both knees.  Swelling at the anserine insertion both knees.  Radiography:  No new x-rays done today.      Assessment:      Impression:      1. Pes bursitis both knees   2. Bilateral knee DJD   3.   4.  Baker cyst both knees.    Chronic pain, both knees.       Plan:       1.  Discussed physical examination  with the patient. Lucas understands that she has pes anserine bursitis of both of her knees with arthritic changes..  Treatment alternatives and outcomes were discussed with the patient she understands she could continue to be treated conservatively with observation, activity modification, NSAIDs, bracing, physical therapy, injections, or she could consider surgical intervention such as arthroscopy versus total knee arthroplasty.  Discussed with the patient that she is beginning to failed conservative management and she might want to consider surgical intervention such as arthroscopy versus total knee arthroplasty.  Since she has advanced tricompartmental arthritis on both of her knees her probable best surgical option would be total knee arthroplasty but she could consider arthroscopy to buy her time if she desired.  She stated she did not want any surgery.  Discussed with the patient that  currently this office is basically doing pain management and she could be managed better through pain management physician until she is ready for surgery so she will be referred to pain management until she is ready for surgery.  2. Offered a steroid injection to the anserine insertion of both knees, she elected to proceed.  3. Continue with over-the-counter Voltaren gel and applied to the pes anserine insertion of both knees twice daily and massage in.  4. Continue using over-the-counter arch supports.    5. Continue wearing braces she also understands she could get knee sleeve braces to help with compression of the pes anserinus which can help with the pain.    6. Take NSAIDs as tolerated allowed by PCM.  7. Continue going to the gym and doing quadriceps and hamstring strengthening.  8. Refer to pain management to evaluate the patient for possible coolief and manage her knee pain until she is ready for surgery.    9. Follow up p.r.n.

## 2023-09-27 NOTE — PROGRESS NOTES
"  Pt presents "syncope"- faint sensation. Offered Trendelenburg bedside position, vital signs monitoring, companionship and water. Pt verbalizes to feel better after interventions and goes home walking without assistance.   "

## 2023-09-27 NOTE — PROCEDURES
Large Joint Aspiration/Injection: bilateral anserine bursa    Date/Time: 9/27/2023 2:00 PM    Performed by: Moreno Rodrigues DO  Authorized by: Moreno Rodrigues, DO    Consent Done?:  Yes (Verbal)  Indications:  Diagnostic evaluation and pain  Site marked: the procedure site was marked    Timeout: prior to procedure the correct patient, procedure, and site was verified    Prep: patient was prepped and draped in usual sterile fashion      Details:  Needle Size:  22 G  Ultrasonic Guidance for needle placement?: No    Approach:  Anteromedial  Location:  Knee  Laterality:  Bilateral  Site:  Bilateral anserine bursa  Medications (Right):  40 mg triamcinolone acetonide 40 mg/mL  Medications (Left):  40 mg triamcinolone acetonide 40 mg/mL  Patient tolerance:  Patient tolerated the procedure well with no immediate complications

## 2023-10-11 ENCOUNTER — OFFICE VISIT (OUTPATIENT)
Dept: DERMATOLOGY | Facility: CLINIC | Age: 68
End: 2023-10-11
Payer: MEDICARE

## 2023-10-11 DIAGNOSIS — L82.1 SEBORRHEIC KERATOSES: ICD-10-CM

## 2023-10-11 DIAGNOSIS — D22.9 MULTIPLE BENIGN NEVI: ICD-10-CM

## 2023-10-11 DIAGNOSIS — Z85.828 HISTORY OF NONMELANOMA SKIN CANCER: ICD-10-CM

## 2023-10-11 DIAGNOSIS — L91.8 SKIN TAG: ICD-10-CM

## 2023-10-11 DIAGNOSIS — L90.5 SCAR: ICD-10-CM

## 2023-10-11 DIAGNOSIS — B07.9 VERRUCA VULGARIS: Primary | ICD-10-CM

## 2023-10-11 DIAGNOSIS — D22.39 FIBROUS PAPULE OF NOSE: ICD-10-CM

## 2023-10-11 DIAGNOSIS — R21 RASH: ICD-10-CM

## 2023-10-11 DIAGNOSIS — D18.01 CHERRY ANGIOMA: ICD-10-CM

## 2023-10-11 DIAGNOSIS — D23.9 DERMATOFIBROMA: ICD-10-CM

## 2023-10-11 DIAGNOSIS — R23.8 VENOUS LAKE: ICD-10-CM

## 2023-10-11 PROCEDURE — 1101F PR PT FALLS ASSESS DOC 0-1 FALLS W/OUT INJ PAST YR: ICD-10-PCS | Mod: CPTII,S$GLB,, | Performed by: STUDENT IN AN ORGANIZED HEALTH CARE EDUCATION/TRAINING PROGRAM

## 2023-10-11 PROCEDURE — 3288F PR FALLS RISK ASSESSMENT DOCUMENTED: ICD-10-PCS | Mod: CPTII,S$GLB,, | Performed by: STUDENT IN AN ORGANIZED HEALTH CARE EDUCATION/TRAINING PROGRAM

## 2023-10-11 PROCEDURE — 99213 PR OFFICE/OUTPT VISIT, EST, LEVL III, 20-29 MIN: ICD-10-PCS | Mod: 25,S$GLB,, | Performed by: STUDENT IN AN ORGANIZED HEALTH CARE EDUCATION/TRAINING PROGRAM

## 2023-10-11 PROCEDURE — 1159F PR MEDICATION LIST DOCUMENTED IN MEDICAL RECORD: ICD-10-PCS | Mod: CPTII,S$GLB,, | Performed by: STUDENT IN AN ORGANIZED HEALTH CARE EDUCATION/TRAINING PROGRAM

## 2023-10-11 PROCEDURE — 17110 PR DESTRUCTION BENIGN LESIONS UP TO 14: ICD-10-PCS | Mod: S$GLB,,, | Performed by: STUDENT IN AN ORGANIZED HEALTH CARE EDUCATION/TRAINING PROGRAM

## 2023-10-11 PROCEDURE — 1160F RVW MEDS BY RX/DR IN RCRD: CPT | Mod: CPTII,S$GLB,, | Performed by: STUDENT IN AN ORGANIZED HEALTH CARE EDUCATION/TRAINING PROGRAM

## 2023-10-11 PROCEDURE — 1126F PR PAIN SEVERITY QUANTIFIED, NO PAIN PRESENT: ICD-10-PCS | Mod: CPTII,S$GLB,, | Performed by: STUDENT IN AN ORGANIZED HEALTH CARE EDUCATION/TRAINING PROGRAM

## 2023-10-11 PROCEDURE — 1159F MED LIST DOCD IN RCRD: CPT | Mod: CPTII,S$GLB,, | Performed by: STUDENT IN AN ORGANIZED HEALTH CARE EDUCATION/TRAINING PROGRAM

## 2023-10-11 PROCEDURE — 99213 OFFICE O/P EST LOW 20 MIN: CPT | Mod: 25,S$GLB,, | Performed by: STUDENT IN AN ORGANIZED HEALTH CARE EDUCATION/TRAINING PROGRAM

## 2023-10-11 PROCEDURE — 17110 DESTRUCTION B9 LES UP TO 14: CPT | Mod: S$GLB,,, | Performed by: STUDENT IN AN ORGANIZED HEALTH CARE EDUCATION/TRAINING PROGRAM

## 2023-10-11 PROCEDURE — 1160F PR REVIEW ALL MEDS BY PRESCRIBER/CLIN PHARMACIST DOCUMENTED: ICD-10-PCS | Mod: CPTII,S$GLB,, | Performed by: STUDENT IN AN ORGANIZED HEALTH CARE EDUCATION/TRAINING PROGRAM

## 2023-10-11 PROCEDURE — 3044F HG A1C LEVEL LT 7.0%: CPT | Mod: CPTII,S$GLB,, | Performed by: STUDENT IN AN ORGANIZED HEALTH CARE EDUCATION/TRAINING PROGRAM

## 2023-10-11 PROCEDURE — 1101F PT FALLS ASSESS-DOCD LE1/YR: CPT | Mod: CPTII,S$GLB,, | Performed by: STUDENT IN AN ORGANIZED HEALTH CARE EDUCATION/TRAINING PROGRAM

## 2023-10-11 PROCEDURE — 1126F AMNT PAIN NOTED NONE PRSNT: CPT | Mod: CPTII,S$GLB,, | Performed by: STUDENT IN AN ORGANIZED HEALTH CARE EDUCATION/TRAINING PROGRAM

## 2023-10-11 PROCEDURE — 3044F PR MOST RECENT HEMOGLOBIN A1C LEVEL <7.0%: ICD-10-PCS | Mod: CPTII,S$GLB,, | Performed by: STUDENT IN AN ORGANIZED HEALTH CARE EDUCATION/TRAINING PROGRAM

## 2023-10-11 PROCEDURE — 3288F FALL RISK ASSESSMENT DOCD: CPT | Mod: CPTII,S$GLB,, | Performed by: STUDENT IN AN ORGANIZED HEALTH CARE EDUCATION/TRAINING PROGRAM

## 2023-10-11 RX ORDER — PREDNISONE 20 MG/1
20 TABLET ORAL
COMMUNITY
Start: 2023-09-19 | End: 2023-11-06

## 2023-10-11 RX ORDER — LEVOFLOXACIN 750 MG/1
750 TABLET ORAL
COMMUNITY
Start: 2023-09-19 | End: 2023-11-06

## 2023-10-11 NOTE — PATIENT INSTRUCTIONS

## 2023-10-11 NOTE — PROGRESS NOTES
Subjective:      Patient ID:  Lucas Mayer is a 68 y.o. female who presents for   Chief Complaint   Patient presents with    Spot     back     LOV 5/5/22 [place]     Patient here today for skin check TBSE  Patient states she has some spots on back  States she had a rash on legs that started in June. No pain or itching. Cleared recently after starting Prednisone and Levaquin for head/chest cold.     Derm Hx:   Patient reports skin cancer on right eyelid over 20 years ago      Review of Systems   Constitutional: Negative.  Negative for fever, chills and fatigue.   Respiratory:  Negative for cough and shortness of breath.    Gastrointestinal:  Negative for nausea and vomiting.   Skin:  Positive for activity-related sunscreen use. Negative for daily sunscreen use and recent sunburn.   Hematologic/Lymphatic: Does not bruise/bleed easily.       Objective:   Physical Exam   Constitutional: She appears well-developed and well-nourished. No distress.   Neurological: She is alert and oriented to person, place, and time. She is not disoriented.   Psychiatric: She has a normal mood and affect.   Skin:   Areas Examined (abnormalities noted in diagram):   Scalp / Hair Palpated and Inspected  Head / Face Inspection Performed  Neck Inspection Performed  Chest / Axilla Inspection Performed  Abdomen Inspection Performed  Genitals / Buttocks / Groin Inspection Performed  Back Inspection Performed  RUE Inspected  LUE Inspection Performed  RLE Inspected  LLE Inspection Performed  Nails and Digits Inspection Performed                         Diagram Legend     Erythematous scaling macule/papule c/w actinic keratosis       Vascular papule c/w angioma      Pigmented verrucoid papule/plaque c/w seborrheic keratosis      Yellow umbilicated papule c/w sebaceous hyperplasia      Irregularly shaped tan macule c/w lentigo     1-2 mm smooth white papules consistent with Milia      Movable subcutaneous cyst with punctum c/w epidermal  inclusion cyst      Subcutaneous movable cyst c/w pilar cyst      Firm pink to brown papule c/w dermatofibroma      Pedunculated fleshy papule(s) c/w skin tag(s)      Evenly pigmented macule c/w junctional nevus     Mildly variegated pigmented, slightly irregular-bordered macule c/w mildly atypical nevus      Flesh colored to evenly pigmented papule c/w intradermal nevus       Pink pearly papule/plaque c/w basal cell carcinoma      Erythematous hyperkeratotic cursted plaque c/w SCC      Surgical scar with no sign of skin cancer recurrence      Open and closed comedones      Inflammatory papules and pustules      Verrucoid papule consistent consistent with wart     Erythematous eczematous patches and plaques     Dystrophic onycholytic nail with subungual debris c/w onychomycosis     Umbilicated papule    Erythematous-base heme-crusted tan verrucoid plaque consistent with inflamed seborrheic keratosis     Erythematous Silvery Scaling Plaque c/w Psoriasis     See annotation      Assessment / Plan:        Verruca vulgaris right 4th digit  Cryosurgery procedure note:    Verbal consent from the patient is obtained. Liquid nitrogen cryosurgery is applied to 1 verruca with prior paring, as detailed in the physical exam, to produce a freeze injury. 2 consecutive freeze thaw cycles are applied to each verruca. The patient is aware that blisters (possibly blood blisters) may form.  [place] in 6 weeks    Seborrheic keratoses  -     Ambulatory referral/consult to Dermatology  These are benign inherited growths without a malignant potential. Reassurance given to patient. No treatment is necessary.     Cherry angioma  This is a benign vascular lesion. Reassurance given. No treatment required.     [place]  Reassurance    Multiple benign nevi  Careful dermoscopy evaluation of nevi performed with none identified as needing biopsy today  Monitor for new mole or moles that are becoming bigger, darker, irritated, or developing  irregular borders.     Dermatofibroma  This is a benign scar-like lesion secondary to minor trauma. No treatment required.     Skin tag  - discussed that lesions are benign, non-cancerous. Removal is cosmetic. Price sheet provided to patient. Discussed risks of scarring and dyspigmentation after removal.     Fibrous papule of nose  Reassurance, monitor, biopsy if changing    History of nonmelanoma skin cancer  Scar  Area of previous NMSC examined. Site well healed with no signs of recurrence.  Total body skin examination performed today including at least 12 points as noted in physical examination. No lesions suspicious for malignancy noted.  Patient instructed in importance in daily broad spectrum sun protection of at least spf 30. Mineral sunscreen ingredients preferred (Zinc +/- Titanium) and can be found OTC.   Patient encouraged to wear hat for all outdoor exposure.   Also discussed sun avoidance and use of protective clothing.    Rash  Resolved after she took antibiotic and prednisone  Call if it recurs         6 weeks for wart  No follow-ups on file.

## 2023-10-13 ENCOUNTER — TELEPHONE (OUTPATIENT)
Dept: OPHTHALMOLOGY | Facility: CLINIC | Age: 68
End: 2023-10-13
Payer: MEDICARE

## 2023-10-21 NOTE — PROGRESS NOTES
Assessment /Plan     For exam results, see Encounter Report.    Acquired mechanical ptosis of eyelid, bilateral  -     traMADoL (ULTRAM) 50 mg tablet; Take 1 tablet (50 mg total) by mouth every 6 (six) hours as needed for Pain.  Dispense: 16 tablet; Refill: 0  -     neomycin-polymyxin-dexamethasone (MAXITROL) 3.5 mg/g-10,000 unit/g-0.1 % Oint; Place ointment over suture lines 3 times daily  Dispense: 3.5 g; Refill: 3    Dermatochalasis of both lower eyelids  -     traMADoL (ULTRAM) 50 mg tablet; Take 1 tablet (50 mg total) by mouth every 6 (six) hours as needed for Pain.  Dispense: 16 tablet; Refill: 0  -     neomycin-polymyxin-dexamethasone (MAXITROL) 3.5 mg/g-10,000 unit/g-0.1 % Oint; Place ointment over suture lines 3 times daily  Dispense: 3.5 g; Refill: 3      Operative Note  Ophthalmology Service        Date of Procedure: 10/23/2023      Attending Physician: Toya Jaimes MD     Assistant: Franco Armendariz MD     Pre-Operative Diagnosis: Bilateral mechanical upper eyelid ptosis, bilateral upper eyelid dermatochalasis     Post-Operative Diagnosis: Same as pre-operative diagnosis     Treatments/Procedures: Bilateral upper eyelid blepharoplasty     Intraoperative Findings: Bilateral mechanical upper eyelid ptosis, bilateral upper eyelid dermatochalasis     Anesthesia: Local (mixture of 2% lidocaine with 1:200,000 epinephrine, 0.5% bupivacaine, Vitrase)     Complications: None     Estimated Blood Loss: < 20 cc    Specimen: None     Indications for surgery: 68 y.o.-year-old female with a history of bilateral upper eyelid ptosis/dermatochalasis interfering with activities of daily living. Patient understands the risk of pain, bleeding, infection, ocular injury, loss of the eye, asymmetry, need for revision in future, scarring.       Procedure in detail:   With patient sitting in an upright position, both upper eyelids were marked at the medial pupil border. The patient was brought to the procedure room and  placed in supine position. The skin incisions were marked. Calipers were used to measure and eduardo the natural eyelid creases of both upper eyelids. Several cc of 2% lidocaine with epinephrine, 0.75% bupivacaine, and Vitrase was injected subcutaneously into both upper eyelids. The full face was then prepped using topical Betadine, and the patient was draped in sterile fashion.       Attention was turned to the right upper eyelid. A corneal shield was placed in the right palpebral fissure. The skin was incised along the markings using a #15 scalpel. A skin-only flap was raised using monopolar cautery and Burgess forceps. The orbicularis and the septum were incised on the medial aspect using a Melissa scissors to expose the medial fat pad. The medial fat pad and a small prolapsing portion of the central fat pad were resected using a monopolar cautery with good hemostasis. The procedure was repeated for the left upper eyelid. The skin incisions were closed with a running 6-0 Prolene on a P-1 needle. The traction suture and corneal shield were removed.    Tobradex ointment was applied to both upper eyelids. The patient tolerated the procedure well without any complications.

## 2023-10-22 DIAGNOSIS — E78.5 HYPERLIPIDEMIA, UNSPECIFIED HYPERLIPIDEMIA TYPE: ICD-10-CM

## 2023-10-22 NOTE — TELEPHONE ENCOUNTER
No care due was identified.  [facility] Embedded Care Due Messages. Reference number: [id number].   10/22/2023 11:50:19 AM CDT

## 2023-10-23 ENCOUNTER — PROCEDURE VISIT (OUTPATIENT)
Dept: OPHTHALMOLOGY | Facility: CLINIC | Age: 68
End: 2023-10-23
Payer: MEDICARE

## 2023-10-23 VITALS — DIASTOLIC BLOOD PRESSURE: 74 MMHG | HEART RATE: 75 BPM | SYSTOLIC BLOOD PRESSURE: 118 MMHG

## 2023-10-23 DIAGNOSIS — H02.832 DERMATOCHALASIS OF BOTH LOWER EYELIDS: ICD-10-CM

## 2023-10-23 DIAGNOSIS — H02.835 DERMATOCHALASIS OF BOTH LOWER EYELIDS: ICD-10-CM

## 2023-10-23 DIAGNOSIS — H02.413 ACQUIRED MECHANICAL PTOSIS OF EYELID, BILATERAL: Primary | ICD-10-CM

## 2023-10-23 PROCEDURE — 15823 BLEPHARP UPR EYELID XCSV SKN: CPT | Mod: 50,S$GLB,, | Performed by: OPHTHALMOLOGY

## 2023-10-23 PROCEDURE — 15823 PR REV UPPER EYELID W EXCESS SKIN: ICD-10-PCS | Mod: 50,S$GLB,, | Performed by: OPHTHALMOLOGY

## 2023-10-23 PROCEDURE — 99499 UNLISTED E&M SERVICE: CPT | Mod: S$GLB,,, | Performed by: OPHTHALMOLOGY

## 2023-10-23 PROCEDURE — 99499 NO LOS: ICD-10-PCS | Mod: S$GLB,,, | Performed by: OPHTHALMOLOGY

## 2023-10-23 RX ORDER — TRAMADOL HYDROCHLORIDE 50 MG/1
50 TABLET ORAL EVERY 6 HOURS PRN
Qty: 16 TABLET | Refills: 0 | Status: SHIPPED | OUTPATIENT
Start: 2023-10-23 | End: 2023-11-06

## 2023-10-23 RX ORDER — NEOMYCIN SULFATE, POLYMYXIN B SULFATE, AND DEXAMETHASONE 3.5; 10000; 1 MG/G; [USP'U]/G; MG/G
OINTMENT OPHTHALMIC
Qty: 3.5 G | Refills: 3 | Status: SHIPPED | OUTPATIENT
Start: 2023-10-23 | End: 2023-10-31

## 2023-10-23 RX ORDER — ATORVASTATIN CALCIUM 40 MG/1
40 TABLET, FILM COATED ORAL DAILY
Qty: 90 TABLET | Refills: 3 | Status: SHIPPED | OUTPATIENT
Start: 2023-10-23

## 2023-10-23 NOTE — TELEPHONE ENCOUNTER
LOV: 6/21/2023  Upcoming appointment set for 11/6/2023    Allergies confirmed, medication pended, routed to MD.

## 2023-10-31 ENCOUNTER — OFFICE VISIT (OUTPATIENT)
Dept: OPHTHALMOLOGY | Facility: CLINIC | Age: 68
End: 2023-10-31
Payer: MEDICARE

## 2023-10-31 DIAGNOSIS — Z98.890 POST-OPERATIVE STATE: Primary | ICD-10-CM

## 2023-10-31 DIAGNOSIS — H02.413 ACQUIRED MECHANICAL PTOSIS OF EYELID, BILATERAL: ICD-10-CM

## 2023-10-31 PROCEDURE — 1160F RVW MEDS BY RX/DR IN RCRD: CPT | Mod: CPTII,S$GLB,, | Performed by: OPHTHALMOLOGY

## 2023-10-31 PROCEDURE — 3044F HG A1C LEVEL LT 7.0%: CPT | Mod: CPTII,S$GLB,, | Performed by: OPHTHALMOLOGY

## 2023-10-31 PROCEDURE — 1101F PR PT FALLS ASSESS DOC 0-1 FALLS W/OUT INJ PAST YR: ICD-10-PCS | Mod: CPTII,S$GLB,, | Performed by: OPHTHALMOLOGY

## 2023-10-31 PROCEDURE — 99999 PR PBB SHADOW E&M-EST. PATIENT-LVL III: CPT | Mod: PBBFAC,,, | Performed by: OPHTHALMOLOGY

## 2023-10-31 PROCEDURE — 92285 EXTERNAL OCULAR PHOTOGRAPHY: CPT | Mod: S$GLB,,, | Performed by: OPHTHALMOLOGY

## 2023-10-31 PROCEDURE — 1159F PR MEDICATION LIST DOCUMENTED IN MEDICAL RECORD: ICD-10-PCS | Mod: CPTII,S$GLB,, | Performed by: OPHTHALMOLOGY

## 2023-10-31 PROCEDURE — 1159F MED LIST DOCD IN RCRD: CPT | Mod: CPTII,S$GLB,, | Performed by: OPHTHALMOLOGY

## 2023-10-31 PROCEDURE — 99024 POSTOP FOLLOW-UP VISIT: CPT | Mod: S$GLB,,, | Performed by: OPHTHALMOLOGY

## 2023-10-31 PROCEDURE — 1160F PR REVIEW ALL MEDS BY PRESCRIBER/CLIN PHARMACIST DOCUMENTED: ICD-10-PCS | Mod: CPTII,S$GLB,, | Performed by: OPHTHALMOLOGY

## 2023-10-31 PROCEDURE — 99024 SUTURE REMOVAL: ICD-10-PCS | Mod: S$GLB,,, | Performed by: OPHTHALMOLOGY

## 2023-10-31 PROCEDURE — 92285 EXTERNAL PHOTOGRAPHY - OU - BOTH EYES: ICD-10-PCS | Mod: S$GLB,,, | Performed by: OPHTHALMOLOGY

## 2023-10-31 PROCEDURE — 1101F PT FALLS ASSESS-DOCD LE1/YR: CPT | Mod: CPTII,S$GLB,, | Performed by: OPHTHALMOLOGY

## 2023-10-31 PROCEDURE — 3044F PR MOST RECENT HEMOGLOBIN A1C LEVEL <7.0%: ICD-10-PCS | Mod: CPTII,S$GLB,, | Performed by: OPHTHALMOLOGY

## 2023-10-31 PROCEDURE — 3288F FALL RISK ASSESSMENT DOCD: CPT | Mod: CPTII,S$GLB,, | Performed by: OPHTHALMOLOGY

## 2023-10-31 PROCEDURE — 3288F PR FALLS RISK ASSESSMENT DOCUMENTED: ICD-10-PCS | Mod: CPTII,S$GLB,, | Performed by: OPHTHALMOLOGY

## 2023-10-31 PROCEDURE — 99999 PR PBB SHADOW E&M-EST. PATIENT-LVL III: ICD-10-PCS | Mod: PBBFAC,,, | Performed by: OPHTHALMOLOGY

## 2023-10-31 RX ORDER — NEOMYCIN SULFATE, POLYMYXIN B SULFATE, AND DEXAMETHASONE 3.5; 10000; 1 MG/G; [USP'U]/G; MG/G
OINTMENT OPHTHALMIC
Qty: 1 EACH | Refills: 1 | Status: SHIPPED | OUTPATIENT
Start: 2023-10-31 | End: 2023-11-06

## 2023-10-31 NOTE — PROGRESS NOTES
ROSANGELA    Lucas Mayer is a/an 68 y.o. female here for 1 week post op.  Date of procedure: 10/23/23  Procedure: BILATERAL BLEPH  Oral antibiotics:   one   Eye meds:   MAXITROL RODRIGUEZ  MAXITROL GTTS    Patient doing well following procedure. No pain or discomfort.     Last edited by Bro Anderson on 10/31/2023  1:12 PM.            Assessment /Plan     For exam results, see Encounter Report.    Post-operative state  -     External Photography - OU - Both Eyes  -     SUTURE REMOVAL  -     neomycin-polymyxin-dexamethasone (DEXACINE) 3.5 mg/g-10,000 unit/g-0.1 % Oint; Apply to suture line twice daily for one week, then once daily for one week, then stop.  Dispense: 1 each; Refill: 1    Acquired mechanical ptosis of eyelid, bilateral      Patient doing well! Post-operative instructions reviewed. Sutures removed. All questions answered.  Return in 5 weeks prn sooner any worsening of vision/symptoms or any concerns.

## 2023-11-06 ENCOUNTER — OFFICE VISIT (OUTPATIENT)
Dept: INTERNAL MEDICINE | Facility: CLINIC | Age: 68
End: 2023-11-06
Attending: INTERNAL MEDICINE
Payer: MEDICARE

## 2023-11-06 VITALS
HEART RATE: 68 BPM | DIASTOLIC BLOOD PRESSURE: 72 MMHG | SYSTOLIC BLOOD PRESSURE: 108 MMHG | HEIGHT: 62 IN | BODY MASS INDEX: 34.77 KG/M2 | OXYGEN SATURATION: 96 % | WEIGHT: 188.94 LBS

## 2023-11-06 DIAGNOSIS — R42 VERTIGO: ICD-10-CM

## 2023-11-06 DIAGNOSIS — E78.00 PURE HYPERCHOLESTEROLEMIA: Primary | ICD-10-CM

## 2023-11-06 DIAGNOSIS — I10 BENIGN HYPERTENSION: ICD-10-CM

## 2023-11-06 DIAGNOSIS — K76.0 FATTY LIVER: ICD-10-CM

## 2023-11-06 PROCEDURE — 3288F FALL RISK ASSESSMENT DOCD: CPT | Mod: CPTII,S$GLB,, | Performed by: INTERNAL MEDICINE

## 2023-11-06 PROCEDURE — 99214 PR OFFICE/OUTPT VISIT, EST, LEVL IV, 30-39 MIN: ICD-10-PCS | Mod: S$GLB,,, | Performed by: INTERNAL MEDICINE

## 2023-11-06 PROCEDURE — 3008F BODY MASS INDEX DOCD: CPT | Mod: CPTII,S$GLB,, | Performed by: INTERNAL MEDICINE

## 2023-11-06 PROCEDURE — 99999 PR PBB SHADOW E&M-EST. PATIENT-LVL III: CPT | Mod: PBBFAC,,, | Performed by: INTERNAL MEDICINE

## 2023-11-06 PROCEDURE — 1101F PT FALLS ASSESS-DOCD LE1/YR: CPT | Mod: CPTII,S$GLB,, | Performed by: INTERNAL MEDICINE

## 2023-11-06 PROCEDURE — 3078F PR MOST RECENT DIASTOLIC BLOOD PRESSURE < 80 MM HG: ICD-10-PCS | Mod: CPTII,S$GLB,, | Performed by: INTERNAL MEDICINE

## 2023-11-06 PROCEDURE — 3288F PR FALLS RISK ASSESSMENT DOCUMENTED: ICD-10-PCS | Mod: CPTII,S$GLB,, | Performed by: INTERNAL MEDICINE

## 2023-11-06 PROCEDURE — 3074F PR MOST RECENT SYSTOLIC BLOOD PRESSURE < 130 MM HG: ICD-10-PCS | Mod: CPTII,S$GLB,, | Performed by: INTERNAL MEDICINE

## 2023-11-06 PROCEDURE — 3078F DIAST BP <80 MM HG: CPT | Mod: CPTII,S$GLB,, | Performed by: INTERNAL MEDICINE

## 2023-11-06 PROCEDURE — 99999 PR PBB SHADOW E&M-EST. PATIENT-LVL III: ICD-10-PCS | Mod: PBBFAC,,, | Performed by: INTERNAL MEDICINE

## 2023-11-06 PROCEDURE — 1101F PR PT FALLS ASSESS DOC 0-1 FALLS W/OUT INJ PAST YR: ICD-10-PCS | Mod: CPTII,S$GLB,, | Performed by: INTERNAL MEDICINE

## 2023-11-06 PROCEDURE — 1126F AMNT PAIN NOTED NONE PRSNT: CPT | Mod: CPTII,S$GLB,, | Performed by: INTERNAL MEDICINE

## 2023-11-06 PROCEDURE — 1159F PR MEDICATION LIST DOCUMENTED IN MEDICAL RECORD: ICD-10-PCS | Mod: CPTII,S$GLB,, | Performed by: INTERNAL MEDICINE

## 2023-11-06 PROCEDURE — 3044F PR MOST RECENT HEMOGLOBIN A1C LEVEL <7.0%: ICD-10-PCS | Mod: CPTII,S$GLB,, | Performed by: INTERNAL MEDICINE

## 2023-11-06 PROCEDURE — 1160F PR REVIEW ALL MEDS BY PRESCRIBER/CLIN PHARMACIST DOCUMENTED: ICD-10-PCS | Mod: CPTII,S$GLB,, | Performed by: INTERNAL MEDICINE

## 2023-11-06 PROCEDURE — 1159F MED LIST DOCD IN RCRD: CPT | Mod: CPTII,S$GLB,, | Performed by: INTERNAL MEDICINE

## 2023-11-06 PROCEDURE — 1126F PR PAIN SEVERITY QUANTIFIED, NO PAIN PRESENT: ICD-10-PCS | Mod: CPTII,S$GLB,, | Performed by: INTERNAL MEDICINE

## 2023-11-06 PROCEDURE — 3044F HG A1C LEVEL LT 7.0%: CPT | Mod: CPTII,S$GLB,, | Performed by: INTERNAL MEDICINE

## 2023-11-06 PROCEDURE — 1160F RVW MEDS BY RX/DR IN RCRD: CPT | Mod: CPTII,S$GLB,, | Performed by: INTERNAL MEDICINE

## 2023-11-06 PROCEDURE — 3008F PR BODY MASS INDEX (BMI) DOCUMENTED: ICD-10-PCS | Mod: CPTII,S$GLB,, | Performed by: INTERNAL MEDICINE

## 2023-11-06 PROCEDURE — 99214 OFFICE O/P EST MOD 30 MIN: CPT | Mod: S$GLB,,, | Performed by: INTERNAL MEDICINE

## 2023-11-06 PROCEDURE — 3074F SYST BP LT 130 MM HG: CPT | Mod: CPTII,S$GLB,, | Performed by: INTERNAL MEDICINE

## 2023-11-06 RX ORDER — ALBUTEROL SULFATE 90 UG/1
AEROSOL, METERED RESPIRATORY (INHALATION)
COMMUNITY
Start: 2023-09-19

## 2023-11-06 NOTE — PROGRESS NOTES
Subjective:       Patient ID: Lucas Mayer is a 68 y.o. female.    Chief Complaint: Annual Exam    Here for routine f/u    66 yo F with PMHx of HTN, chronic vertigo, Severe REM related MORALES, NAFLD, severe snoring (rec ENT eval) and PLMS without arousals      Has lost 25 lbs.  Had a fall while walking out of [employer]   Wart on palmar finger.     Has numerous moles that need investigation and routine skin check. Saw dermatology.        Some bilateral leg swelling. Denies CP at rest or with exertion, dizziness with exertion, shortness of breath out of proportion to level of activity, frequent or sustained palpitations, orthopnea, PND.         ### NAFLD ###  FIB-4 Calculation: 1.13 at 11/6/2023 10:33 AM  Last RUQ U/S: 6/30/23      ### Vertigo ###  She was placed on low dose valium 2mg BID with an 80% reduction in her symptoms. Symptoms remain well controlled. She will still get dizzy when lying flat or driving over bridges. She must walk on edge of hallways to minimize trigger     ### Bilateral knee pain ###  -frequent cracking and occasional pain. Not debilitating. Overall stable. 75lb dog hit her right knee and it has been improved since. Now her left leg is more bothersome.     5/25/22 worsened.        ### HTN ###  Amlodipine 5mg  Denies frequent or current HA, intermittent blurry vision, dizziness, CP, or SOB.  [facility]      ### MORALES ###  Moderate MORALES, Severe REM related MORALES, severe snoring (rec ENT eval) and PLMS without arousals  CPAP titration 12/16/19  Tolerating CPAP from [facility]          Review of Systems   Constitutional:  Negative for chills, fatigue, fever and unexpected weight change.   HENT:  Negative for ear pain, hearing loss, postnasal drip, tinnitus, trouble swallowing and voice change.    Respiratory:  Negative for cough, chest tightness, shortness of breath and wheezing.    Cardiovascular:  Negative for chest pain, palpitations and leg  "swelling.   Gastrointestinal:  Negative for abdominal pain, blood in stool, diarrhea, nausea and vomiting.   Endocrine: Negative for polydipsia, polyphagia and polyuria.   Genitourinary:  Negative for difficulty urinating, dysuria, hematuria and vaginal bleeding.   Skin:  Negative for rash.   Allergic/Immunologic: Negative for food allergies.   Neurological:  Negative for dizziness, numbness and headaches.   Hematological:  Does not bruise/bleed easily.   Psychiatric/Behavioral:  The patient is not nervous/anxious.        Objective:      Vitals:    11/06/23 1036   BP: 108/72   BP Location: Right arm   Patient Position: Sitting   BP Method: Medium (Manual)   Pulse: 68   SpO2: 96%   Weight: 85.7 kg (188 lb 15 oz)   Height: 5' 2" (1.575 m)      Physical Exam  Vitals and nursing note reviewed.   Constitutional:       General: She is not in acute distress.     Appearance: Normal appearance. She is well-developed.   HENT:      Head: Normocephalic and atraumatic.      Mouth/Throat:      Pharynx: No oropharyngeal exudate.   Eyes:      General: No scleral icterus.     Conjunctiva/sclera: Conjunctivae normal.      Pupils: Pupils are equal, round, and reactive to light.   Neck:      Thyroid: No thyromegaly.   Cardiovascular:      Rate and Rhythm: Normal rate and regular rhythm.      Heart sounds: Normal heart sounds. No murmur heard.  Pulmonary:      Effort: Pulmonary effort is normal.      Breath sounds: Normal breath sounds. No wheezing or rales.   Abdominal:      General: There is no distension.   Musculoskeletal:         General: No tenderness.   Lymphadenopathy:      Cervical: No cervical adenopathy.   Skin:     General: Skin is warm and dry.   Neurological:      Mental Status: She is alert and oriented to person, place, and time.   Psychiatric:         Behavior: Behavior normal.         Assessment:       1. Pure hypercholesterolemia    2. Benign hypertension    3. BMI 34.0-34.9,adult    4. Fatty liver    5. Vertigo      "   Plan:       Lucas was seen today for annual exam.    Diagnoses and all orders for this visit:    Pure hypercholesterolemia   Tolerating statin. Continue this.     Benign hypertension   With weight loss she will monitor and possibly d/c amlodipine     Obesity (BMI 35.0-39.9 without comorbidity)   Improving.     Fatty liver   Weight loss. Check FLP next lab draw.    Vertigo       RTC in 6 months or sooner prangie Strickland MD  Internal Medicine-[facility]        Side effects of medication(s) were discussed in detail and patient voiced understanding.  Patient will call back for any issues or complications.

## 2023-12-04 DIAGNOSIS — F32.A DEPRESSION, UNSPECIFIED DEPRESSION TYPE: ICD-10-CM

## 2023-12-04 RX ORDER — SOLIFENACIN SUCCINATE 5 MG/1
5 TABLET, FILM COATED ORAL 2 TIMES DAILY
Qty: 60 TABLET | Refills: 2 | Status: SHIPPED | OUTPATIENT
Start: 2023-12-04

## 2023-12-04 RX ORDER — DIAZEPAM 2 MG/1
2 TABLET ORAL EVERY 8 HOURS PRN
Qty: 60 TABLET | Refills: 2 | Status: SHIPPED | OUTPATIENT
Start: 2023-12-04 | End: 2024-02-27 | Stop reason: SDUPTHER

## 2023-12-04 NOTE — TELEPHONE ENCOUNTER
No care due was identified.  [facility] Embedded Care Due Messages. Reference number: [id number].   12/04/2023 8:30:06 AM CST

## 2023-12-10 NOTE — PROGRESS NOTES
HPI    Patient here for BILATERAL BLEPH  No ASA's/NSAIDS taken in the past 7 days.   2mg diazepam Valium taken at 6am, 5mg Valium taken at arrival.    Last edited by Bro Anderson on 10/23/2023  9:38 AM.            Assessment /Plan     For exam results, see Encounter Report.    Acquired mechanical ptosis of eyelid, bilateral  -     Discontinue: traMADoL (ULTRAM) 50 mg tablet; Take 1 tablet (50 mg total) by mouth every 6 (six) hours as needed for Pain.  Dispense: 16 tablet; Refill: 0  -     Discontinue: neomycin-polymyxin-dexamethasone (MAXITROL) 3.5 mg/g-10,000 unit/g-0.1 % Oint; Place ointment over suture lines 3 times daily  Dispense: 3.5 g; Refill: 3    Dermatochalasis of both lower eyelids  -     Discontinue: traMADoL (ULTRAM) 50 mg tablet; Take 1 tablet (50 mg total) by mouth every 6 (six) hours as needed for Pain.  Dispense: 16 tablet; Refill: 0  -     Discontinue: neomycin-polymyxin-dexamethasone (MAXITROL) 3.5 mg/g-10,000 unit/g-0.1 % Oint; Place ointment over suture lines 3 times daily  Dispense: 3.5 g; Refill: 3      Ophthalmology Service        Date of Procedure: 10/23/2023      Attending Physician: Toya Jaimes MD     Assistant: Franco Armendariz MD     Pre-Operative Diagnosis: Bilateral mechanical upper eyelid ptosis     Post-Operative Diagnosis: Same as pre-operative diagnosis     Treatments/Procedures: Bilateral upper eyelid blepharoplasty     Intraoperative Findings: Bilateral mechanical upper eyelid ptosis     Anesthesia: Local (mixture of 2% lidocaine with 1:200,000 epinephrine, 0.5% bupivacaine, Vitrase)     Complications: None     Estimated Blood Loss: < 20 cc     Specimen: None     Indications for surgery: 68 y.o.-year-old female with a history of bilateral upper eyelid mechanical ptosis impairing superior visual fields interfering with activities of daily living. Patient understands the risk of pain, bleeding, infection, ocular injury, loss of the eye, asymmetry, need for revision in future,  scarring.       Procedure in detail:   With patient sitting in an upright position, both upper eyelids were marked at the medial pupil border. The patient was brought to the procedure room and placed in supine position. The skin incisions were marked. Calipers were used to measure and eduardo the natural eyelid creases of both upper eyelids. Several cc of 2% lidocaine with epinephrine, 0.75% bupivacaine, and Vitrase was injected subcutaneously into both upper eyelids. The full face was then prepped using topical Betadine, and the patient was draped in sterile fashion.       Attention was turned to the right upper eyelid. A corneal shield was placed in the right palpebral fissure. The skin was incised along the markings using a #15 scalpel. A skin-only flap was raised using monopolar cautery and Burgess forceps. The orbicularis and the septum were incised on the medial aspect using a Melissa scissors to expose the medial fat pad. The medial fat pad and a small prolapsing portion of the central fat pad were resected using a monopolar cautery with good hemostasis. The procedure was repeated for the left upper eyelid. The skin incisions were closed with a running 6-0 Prolene on a P-1 needle. The traction suture and corneal shield were removed.     Tobradex ointment was applied to both upper eyelids. The patient tolerated the procedure well without any complications.     Post-operative instructions were given to the patient.

## 2023-12-21 ENCOUNTER — OFFICE VISIT (OUTPATIENT)
Dept: OPHTHALMOLOGY | Facility: CLINIC | Age: 68
End: 2023-12-21
Payer: MEDICARE

## 2023-12-21 DIAGNOSIS — H02.413 ACQUIRED MECHANICAL PTOSIS OF EYELID, BILATERAL: ICD-10-CM

## 2023-12-21 DIAGNOSIS — H02.832 DERMATOCHALASIS OF BOTH LOWER EYELIDS: ICD-10-CM

## 2023-12-21 DIAGNOSIS — H02.835 DERMATOCHALASIS OF BOTH LOWER EYELIDS: ICD-10-CM

## 2023-12-21 DIAGNOSIS — H00.15 CHALAZION OF LEFT LOWER EYELID: ICD-10-CM

## 2023-12-21 DIAGNOSIS — Z98.890 POST-OPERATIVE STATE: Primary | ICD-10-CM

## 2023-12-21 PROCEDURE — 99999 PR PBB SHADOW E&M-EST. PATIENT-LVL III: CPT | Mod: PBBFAC,,, | Performed by: OPHTHALMOLOGY

## 2023-12-21 PROCEDURE — 92285 EXTERNAL OCULAR PHOTOGRAPHY: CPT | Mod: S$GLB,,, | Performed by: OPHTHALMOLOGY

## 2023-12-21 PROCEDURE — 1125F PR PAIN SEVERITY QUANTIFIED, PAIN PRESENT: ICD-10-PCS | Mod: CPTII,S$GLB,, | Performed by: OPHTHALMOLOGY

## 2023-12-21 PROCEDURE — 1101F PT FALLS ASSESS-DOCD LE1/YR: CPT | Mod: CPTII,S$GLB,, | Performed by: OPHTHALMOLOGY

## 2023-12-21 PROCEDURE — 1101F PR PT FALLS ASSESS DOC 0-1 FALLS W/OUT INJ PAST YR: ICD-10-PCS | Mod: CPTII,S$GLB,, | Performed by: OPHTHALMOLOGY

## 2023-12-21 PROCEDURE — 3044F PR MOST RECENT HEMOGLOBIN A1C LEVEL <7.0%: ICD-10-PCS | Mod: CPTII,S$GLB,, | Performed by: OPHTHALMOLOGY

## 2023-12-21 PROCEDURE — 99024 POSTOP FOLLOW-UP VISIT: CPT | Mod: S$GLB,,, | Performed by: OPHTHALMOLOGY

## 2023-12-21 PROCEDURE — 3044F HG A1C LEVEL LT 7.0%: CPT | Mod: CPTII,S$GLB,, | Performed by: OPHTHALMOLOGY

## 2023-12-21 PROCEDURE — 1160F PR REVIEW ALL MEDS BY PRESCRIBER/CLIN PHARMACIST DOCUMENTED: ICD-10-PCS | Mod: CPTII,S$GLB,, | Performed by: OPHTHALMOLOGY

## 2023-12-21 PROCEDURE — 99024 PR POST-OP FOLLOW-UP VISIT: ICD-10-PCS | Mod: S$GLB,,, | Performed by: OPHTHALMOLOGY

## 2023-12-21 PROCEDURE — 1159F PR MEDICATION LIST DOCUMENTED IN MEDICAL RECORD: ICD-10-PCS | Mod: CPTII,S$GLB,, | Performed by: OPHTHALMOLOGY

## 2023-12-21 PROCEDURE — 1125F AMNT PAIN NOTED PAIN PRSNT: CPT | Mod: CPTII,S$GLB,, | Performed by: OPHTHALMOLOGY

## 2023-12-21 PROCEDURE — 3288F FALL RISK ASSESSMENT DOCD: CPT | Mod: CPTII,S$GLB,, | Performed by: OPHTHALMOLOGY

## 2023-12-21 PROCEDURE — 3288F PR FALLS RISK ASSESSMENT DOCUMENTED: ICD-10-PCS | Mod: CPTII,S$GLB,, | Performed by: OPHTHALMOLOGY

## 2023-12-21 PROCEDURE — 92285 EXTERNAL PHOTOGRAPHY - OU - BOTH EYES: ICD-10-PCS | Mod: S$GLB,,, | Performed by: OPHTHALMOLOGY

## 2023-12-21 PROCEDURE — 1159F MED LIST DOCD IN RCRD: CPT | Mod: CPTII,S$GLB,, | Performed by: OPHTHALMOLOGY

## 2023-12-21 PROCEDURE — 1160F RVW MEDS BY RX/DR IN RCRD: CPT | Mod: CPTII,S$GLB,, | Performed by: OPHTHALMOLOGY

## 2023-12-21 PROCEDURE — 99999 PR PBB SHADOW E&M-EST. PATIENT-LVL III: ICD-10-PCS | Mod: PBBFAC,,, | Performed by: OPHTHALMOLOGY

## 2023-12-21 NOTE — PROGRESS NOTES
ROSANGELA    Lucas Mayer is a/an 68 y.o. female here for 6 week post op.  Date of procedure: 10/23/23  Procedure: BILATERAL BLEPH  Oral antibiotics: tylenol   Eye meds:   Using Refresh GTTS PRN  Pt states constant pain behind OS (about a 5 on the pain scale)  States OS feels bruised and heavy   Using tylenol, refresh gtts and hot and cold compresses to mitigate pain       Last edited by Bro Anderson on 12/21/2023  1:52 PM.            Assessment /Plan     For exam results, see Encounter Report.    Post-operative state  -     External Photography - OU - Both Eyes    Acquired mechanical ptosis of eyelid, bilateral    Dermatochalasis of both lower eyelids    Chalazion of left lower eyelid      Patient doing well! Post-operative instructions reviewed. All questions answered. Return prn sooner any worsening of vision/symptoms or any concerns.     Recommend warm compresses bid to left lower eyelid to address chalazion.

## 2024-02-16 DIAGNOSIS — I10 BENIGN ESSENTIAL HYPERTENSION: ICD-10-CM

## 2024-02-17 RX ORDER — AMLODIPINE BESYLATE 5 MG/1
TABLET ORAL
Qty: 100 TABLET | Refills: 2 | Status: SHIPPED | OUTPATIENT
Start: 2024-02-17

## 2024-02-17 NOTE — TELEPHONE ENCOUNTER
Refill Decision Note   Lucas Moreno  is requesting a refill authorization.  Brief Assessment and Rationale for Refill:  Approve     Medication Therapy Plan:        Comments:     Note composed:2:14 AM 02/17/2024

## 2024-02-17 NOTE — TELEPHONE ENCOUNTER
No care due was identified.  [facility] Embedded Care Due Messages. Reference number: [id number].   2/16/2024 9:30:32 PM CST

## 2024-02-27 DIAGNOSIS — F32.A DEPRESSION, UNSPECIFIED DEPRESSION TYPE: ICD-10-CM

## 2024-02-27 RX ORDER — DIAZEPAM 2 MG/1
2 TABLET ORAL EVERY 8 HOURS PRN
Qty: 60 TABLET | Refills: 2 | Status: SHIPPED | OUTPATIENT
Start: 2024-02-27 | End: 2024-05-15 | Stop reason: SDUPTHER

## 2024-04-10 RX ORDER — SOLIFENACIN SUCCINATE 5 MG/1
5 TABLET, FILM COATED ORAL 2 TIMES DAILY
Qty: 60 TABLET | Refills: 0 | Status: SHIPPED | OUTPATIENT
Start: 2024-04-10 | End: 2024-06-10 | Stop reason: SDUPTHER

## 2024-04-10 NOTE — PROGRESS NOTES
Ptosis GVF /external photos done ou./ rel/fix/coop. Good ou./ chart checked for latex allergy.-[facility]

## 2024-04-17 ENCOUNTER — PATIENT MESSAGE (OUTPATIENT)
Dept: INTERNAL MEDICINE | Facility: CLINIC | Age: 69
End: 2024-04-17
Payer: MEDICARE

## 2024-04-17 DIAGNOSIS — G89.29 BILATERAL CHRONIC KNEE PAIN: Primary | ICD-10-CM

## 2024-04-17 DIAGNOSIS — M25.562 BILATERAL CHRONIC KNEE PAIN: Primary | ICD-10-CM

## 2024-04-17 DIAGNOSIS — M25.561 BILATERAL CHRONIC KNEE PAIN: Primary | ICD-10-CM

## 2024-04-18 NOTE — TELEPHONE ENCOUNTER
Waiting on Dr. Samuels response or whether or not to schedule her with Dr. Saini and to see if Dr. Saini has any availability/ can do a VV in [place].

## 2024-04-18 NOTE — TELEPHONE ENCOUNTER
I have scheduled a new visit April 25th with Dr Homer Steel, orthopedics, who took the place of Dr Shi.  Should I have asked you to refer me first in order for Medicare to approve?     Also, I have now lost between 37-40 lbs total. I average about 175.  I'm going to keep trying to get down to 150 but I'm at a stalemate right now...     Is it time for me to do blood work or a visit with you?             LOV 11/06/23

## 2024-04-25 ENCOUNTER — OFFICE VISIT (OUTPATIENT)
Dept: ORTHOPEDICS | Facility: CLINIC | Age: 69
End: 2024-04-25
Payer: MEDICARE

## 2024-04-25 VITALS — OXYGEN SATURATION: 99 % | HEIGHT: 62 IN | HEART RATE: 54 BPM | WEIGHT: 188.94 LBS | BODY MASS INDEX: 34.77 KG/M2

## 2024-04-25 DIAGNOSIS — M25.561 BILATERAL CHRONIC KNEE PAIN: ICD-10-CM

## 2024-04-25 DIAGNOSIS — G89.29 BILATERAL CHRONIC KNEE PAIN: ICD-10-CM

## 2024-04-25 DIAGNOSIS — M25.562 BILATERAL CHRONIC KNEE PAIN: ICD-10-CM

## 2024-04-25 PROCEDURE — 1125F AMNT PAIN NOTED PAIN PRSNT: CPT | Mod: CPTII,S$GLB,, | Performed by: ORTHOPAEDIC SURGERY

## 2024-04-25 PROCEDURE — 1160F RVW MEDS BY RX/DR IN RCRD: CPT | Mod: CPTII,S$GLB,, | Performed by: ORTHOPAEDIC SURGERY

## 2024-04-25 PROCEDURE — 3288F FALL RISK ASSESSMENT DOCD: CPT | Mod: CPTII,S$GLB,, | Performed by: ORTHOPAEDIC SURGERY

## 2024-04-25 PROCEDURE — 1100F PTFALLS ASSESS-DOCD GE2>/YR: CPT | Mod: CPTII,S$GLB,, | Performed by: ORTHOPAEDIC SURGERY

## 2024-04-25 PROCEDURE — 99214 OFFICE O/P EST MOD 30 MIN: CPT | Mod: 25,S$GLB,, | Performed by: ORTHOPAEDIC SURGERY

## 2024-04-25 PROCEDURE — 1159F MED LIST DOCD IN RCRD: CPT | Mod: CPTII,S$GLB,, | Performed by: ORTHOPAEDIC SURGERY

## 2024-04-25 PROCEDURE — 3008F BODY MASS INDEX DOCD: CPT | Mod: CPTII,S$GLB,, | Performed by: ORTHOPAEDIC SURGERY

## 2024-04-25 PROCEDURE — 99999 PR PBB SHADOW E&M-EST. PATIENT-LVL III: CPT | Mod: PBBFAC,,, | Performed by: ORTHOPAEDIC SURGERY

## 2024-04-25 PROCEDURE — 20610 DRAIN/INJ JOINT/BURSA W/O US: CPT | Mod: LT,S$GLB,, | Performed by: ORTHOPAEDIC SURGERY

## 2024-04-25 RX ORDER — TRIAMCINOLONE ACETONIDE 40 MG/ML
40 INJECTION, SUSPENSION INTRA-ARTICULAR; INTRAMUSCULAR
Status: DISCONTINUED | OUTPATIENT
Start: 2024-04-25 | End: 2024-04-25 | Stop reason: HOSPADM

## 2024-04-25 RX ADMIN — TRIAMCINOLONE ACETONIDE 40 MG: 40 INJECTION, SUSPENSION INTRA-ARTICULAR; INTRAMUSCULAR at 09:04

## 2024-04-25 NOTE — PROGRESS NOTES
Subjective:      Patient ID: Lucas Mayer is a 68 y.o. female.    Chief Complaint: Pain of the Left Knee and Pain of the Right Knee    HPI  68-year-old female long history of left greater than right bilateral knee pain.  She was told that she had some bursitis along with the ostial arthritis.  She has had injections in the past which have given him temporary relief.  There has been no recent trauma.  She is complaining of pain with prolonged standing and walking.  ROS      Objective:    Ortho Exam     Constitutional:   Patient is alert  and oriented in no acute distress  HEENT:  normocephalic atraumatic; PERRL EOMI  Neck:  Supple without adenopathy  Cardiovascular:  Normal rate and rhythm  Pulmonary:  Normal respiratory effort normal chest wall expansion  Abdominal:  Nonprotuberant nondistended  Musculoskeletal:  Patient has a steady very minimally antalgic gait  She has good bilateral knee range of motion no significant effusion or instability  She has diffuse joint line tenderness and crepitus with range of motion  Neurological:  No focal defect; cranial nerves 2-12 grossly intact  Psychiatric/behavioral:  Mood and behavior normal           My Radiographs Findings:    No new radiographs previous radiographs were reviewed consistent with severe degenerative change of the medial compartment  Assessment:       Encounter Diagnosis   Name Primary?    Bilateral chronic knee pain          Plan:       I have discussed medical condition treatment options with her at length.  After a verbal consent and sterile prep I injected her most symptomatic left knee today without complication with a combination of cc of Kenalog several cc of lidocaine.  If symptoms fail to improve we can consider viscosupplementation or knee replacements in the future if he so chooses.  Follow up in 6-8 weeks sooner if any questions or problems.        Past Medical History:   Diagnosis Date    Cataract     Depression     Hypertension      Other and unspecified hyperlipidemia     Trichiasis     Vertigo      Past Surgical History:   Procedure Laterality Date    CATARACT EXTRACTION W/  INTRAOCULAR LENS IMPLANT Left 2018    Dr Foreman     CATARACT EXTRACTION W/  INTRAOCULAR LENS IMPLANT Left 12/3/2018    Procedure: EXTRACTION, CATARACT, WITH IOL INSERTION;  Surgeon: Emilie Foreman MD;  Location: [facility] OR;  Service: Ophthalmology;  Laterality: Left;    CATARACT EXTRACTION W/  INTRAOCULAR LENS IMPLANT Right 2019    Procedure: EXTRACTION, CATARACT, WITH IOL INSERTION;  Surgeon: Emilie Froeman MD;  Location: [facility] OR;  Service: Ophthalmology;  Laterality: Right;    HYSTERECTOMY      fibroids- BK/BSO    OOPHORECTOMY      TONSILLECTOMY      WISDOM TOOTH EXTRACTION           Current Outpatient Medications:     amLODIPine (NORVASC) 5 MG tablet, TAKE 1 TABLET BY MOUTH ONCE  DAILY, Disp: 100 tablet, Rfl: 2    atorvastatin (LIPITOR) 40 MG tablet, Take 1 tablet (40 mg total) by mouth once daily., Disp: 90 tablet, Rfl: 3    citalopram (CELEXA) 20 MG tablet, Take 1 tablet (20 mg total) by mouth once daily., Disp: 90 tablet, Rfl: 3    diazePAM (VALIUM) 2 MG tablet, Take 1 tablet (2 mg total) by mouth every 8 (eight) hours as needed for Anxiety., Disp: 60 tablet, Rfl: 2    solifenacin (VESICARE) 5 MG tablet, Take 1 tablet (5 mg total) by mouth 2 (two) times a day. Patient has not been seen by this clinic in over 1 year. Needs appointment for refills., Disp: 60 tablet, Rfl: 0    UNABLE TO FIND, Take 5 g by mouth 2 (two) times daily. medication name: Vitafusion Fiber Well sugar free gummies, Disp: , Rfl:     albuterol (PROVENTIL/VENTOLIN HFA) 90 mcg/actuation inhaler, SMARTSI Puff(s) Via Inhaler Every 4-6 Hours PRN (Patient not taking: Reported on 2024), Disp: , Rfl:     calcium citrate-vitamin D3 315-200 mg (CITRACAL+D) 315 mg-5 mcg (200 unit) per tablet, Take 1 tablet by mouth 2 (two) times daily. (Patient not taking: Reported on 2024), Disp: ,  Rfl:     diazePAM (VALIUM) 5 MG tablet, Take 1 tablet by mouth at time of arrival to appointment (Patient not taking: Reported on 4/25/2024), Disp: 1 tablet, Rfl: 0    Review of patient's allergies indicates:   Allergen Reactions    Ancef [cefazolin] Anaphylaxis    Codeine Nausea And Vomiting    Pcn [penicillins] Rash       Family History   Problem Relation Name Age of Onset    Diabetes Mother      Stroke Mother      Essential Tremor Mother      Melanoma Father      Hypertension Father      Essential Tremor Sister      Essential Tremor Sister      Breast cancer Neg Hx      Cancer Neg Hx      Eclampsia Neg Hx       Social History     Occupational History    Not on file   Tobacco Use    Smoking status: Never    Smokeless tobacco: Never   Substance and Sexual Activity    Alcohol use: Yes     Alcohol/week: 0.8 standard drinks of alcohol     Types: 1 Standard drinks or equivalent per week     Comment: Social    Drug use: No    Sexual activity: Not Currently     Partners: Male

## 2024-04-25 NOTE — PROCEDURES
Large Joint Aspiration/Injection: L knee    Date/Time: 4/25/2024 9:30 AM    Performed by: Homer Steel MD  Authorized by: Homer Steel MD    Consent Done?:  Yes (Verbal)  Indications:  Pain  Site marked: the procedure site was marked    Timeout: prior to procedure the correct patient, procedure, and site was verified    Local anesthetic:  Lidocaine 1% without epinephrine and bupivacaine 0.25% without epinephrine  Anesthetic total (ml):  6      Details:  Needle Size:  20 G  Approach:  Anterolateral  Location:  Knee  Site:  L knee  Medications:  40 mg triamcinolone acetonide 40 mg/mL  Patient tolerance:  Patient tolerated the procedure well with no immediate complications

## 2024-05-15 DIAGNOSIS — F32.A DEPRESSION, UNSPECIFIED DEPRESSION TYPE: ICD-10-CM

## 2024-05-15 RX ORDER — DIAZEPAM 2 MG/1
2 TABLET ORAL EVERY 8 HOURS PRN
Qty: 60 TABLET | Refills: 2 | Status: SHIPPED | OUTPATIENT
Start: 2024-05-15

## 2024-05-15 NOTE — TELEPHONE ENCOUNTER
Care Due:                  Date            Visit Type   Department     Provider  --------------------------------------------------------------------------------                                EP -                              PRIMARY      [facility] INTERNAL  Last Visit: 11-      CARE ([facility])   MEDICINE       Jean Carlos Samuels  Next Visit: None Scheduled  None         None Found                                                            Last  Test          Frequency    Reason                     Performed    Due Date  --------------------------------------------------------------------------------    CMP.........  12 months..  atorvastatin.............  06-   06-    Lipid Panel.  12 months..  atorvastatin.............  06-   06-    [employer] Embedded Care Due Messages. Reference number: [id number].   5/15/2024 12:41:20 PM CDT

## 2024-06-10 RX ORDER — SOLIFENACIN SUCCINATE 5 MG/1
5 TABLET, FILM COATED ORAL 2 TIMES DAILY
Qty: 60 TABLET | Refills: 0 | Status: SHIPPED | OUTPATIENT
Start: 2024-06-10

## 2024-07-01 DIAGNOSIS — E78.5 HYPERLIPIDEMIA, UNSPECIFIED HYPERLIPIDEMIA TYPE: ICD-10-CM

## 2024-07-01 RX ORDER — ATORVASTATIN CALCIUM 40 MG/1
40 TABLET, FILM COATED ORAL
Qty: 90 TABLET | Refills: 3 | Status: SHIPPED | OUTPATIENT
Start: 2024-07-01

## 2024-07-01 NOTE — TELEPHONE ENCOUNTER
Refill Routing Note   Medication(s) are not appropriate for processing by [facility] for the following reason(s):        Required labs outdated    [facility] action(s):  Defer               Appointments  past 12m or future 3m with PCP    Date Provider   Last Visit   11/6/2023 Jean Carlos Samuels MD   Next Visit   Visit date not found Jean Carlos Samuels MD   ED visits in past 90 days: 0        Note composed:3:26 AM 07/01/2024

## 2024-07-01 NOTE — TELEPHONE ENCOUNTER
No care due was identified.  [facility] Embedded Care Due Messages. Reference number: [id number].   7/01/2024 3:25:55 AM CDT

## 2024-07-16 ENCOUNTER — TELEPHONE (OUTPATIENT)
Dept: UROGYNECOLOGY | Facility: CLINIC | Age: 69
End: 2024-07-16
Payer: MEDICARE

## 2024-07-16 RX ORDER — SOLIFENACIN SUCCINATE 5 MG/1
5 TABLET, FILM COATED ORAL 2 TIMES DAILY
Qty: 60 TABLET | Refills: 3 | Status: SHIPPED | OUTPATIENT
Start: 2024-07-16

## 2024-07-16 NOTE — TELEPHONE ENCOUNTER
Called to schedule appt. Wants refill & checkup.      ----- Message from Antoinette Perez sent at 7/16/2024 10:53 AM CDT -----  Regarding: appt  Name of Who is Calling:Pt         What is the request in detail: Requesting next open appt  Please advise           Can the clinic reply by [facility]: yes         What Number to Call Back if not in [facility]:Telephone Information:  Mobile          [phone]

## 2024-07-16 NOTE — TELEPHONE ENCOUNTER
Called pt and informed her that Dr. Sweet put in a refill for her medication. Pt verbalized understanding, call ended.

## 2024-08-12 ENCOUNTER — PATIENT MESSAGE (OUTPATIENT)
Dept: INTERNAL MEDICINE | Facility: CLINIC | Age: 69
End: 2024-08-12

## 2024-08-12 ENCOUNTER — TELEPHONE (OUTPATIENT)
Dept: INTERNAL MEDICINE | Facility: CLINIC | Age: 69
End: 2024-08-12
Payer: MEDICARE

## 2024-08-12 DIAGNOSIS — Z12.11 ENCOUNTER FOR SCREENING COLONOSCOPY: Primary | ICD-10-CM

## 2024-08-12 DIAGNOSIS — H57.12 LEFT EYE PAIN: ICD-10-CM

## 2024-08-15 NOTE — TELEPHONE ENCOUNTER
Pending [facility] ref.     Not sure what OTC mean besides over the counter when she ref to her memory and mixing up words.

## 2024-08-16 ENCOUNTER — TELEPHONE (OUTPATIENT)
Dept: ORTHOPEDICS | Facility: CLINIC | Age: 69
End: 2024-08-16
Payer: MEDICARE

## 2024-08-16 ENCOUNTER — PATIENT MESSAGE (OUTPATIENT)
Dept: FAMILY MEDICINE | Facility: CLINIC | Age: 69
End: 2024-08-16
Payer: MEDICARE

## 2024-08-18 DIAGNOSIS — F32.A DEPRESSION, UNSPECIFIED DEPRESSION TYPE: ICD-10-CM

## 2024-08-18 NOTE — TELEPHONE ENCOUNTER
Refill Routing Note   Medication(s) are not appropriate for processing by [facility] for the following reason(s):        Drug-drug interaction    [facility] action(s):  Defer   Requires labs : Yes      Medication Therapy Plan: FATTY LIVER      Appointments  past 12m or future 3m with PCP    Date Provider   Last Visit   11/6/2023 Jean Carlos Samuels MD   Next Visit   Visit date not found Jean Carlos Samuels MD   ED visits in past 90 days: 0        Note composed:11:51 AM 08/18/2024

## 2024-08-18 NOTE — TELEPHONE ENCOUNTER
Care Due:                  Date            Visit Type   Department     Provider  --------------------------------------------------------------------------------                                EP -                              PRIMARY      [facility] INTERNAL  Last Visit: 11-      CARE ([facility])   MEDICINE       Jean Carlos Samuels  Next Visit: None Scheduled  None         None Found                                                            Last  Test          Frequency    Reason                     Performed    Due Date  --------------------------------------------------------------------------------    CMP.........  12 months..  atorvastatin.............  06-   06-    Lipid Panel.  12 months..  atorvastatin.............  06-   06-    [employer] Embedded Care Due Messages. Reference number: [id number].   8/18/2024 3:34:32 AM CDT

## 2024-08-19 DIAGNOSIS — Z12.11 ENCOUNTER FOR SCREENING COLONOSCOPY: Primary | ICD-10-CM

## 2024-08-19 DIAGNOSIS — F32.A DEPRESSION, UNSPECIFIED DEPRESSION TYPE: ICD-10-CM

## 2024-08-19 RX ORDER — CITALOPRAM 20 MG/1
20 TABLET, FILM COATED ORAL
Qty: 90 TABLET | Refills: 2 | Status: SHIPPED | OUTPATIENT
Start: 2024-08-19

## 2024-08-19 NOTE — TELEPHONE ENCOUNTER
Refill Encounter    PCP Visits: Recent Visits  Date Type Provider Dept   11/06/23 Office Visit Jean Carlos Samuels MD [facility] Internal Medicine   Showing recent visits within past 360 days and meeting all other requirements  Future Appointments  No visits were found meeting these conditions.  Showing future appointments within next 720 days and meeting all other requirements     Last 3 Blood Pressure:   BP Readings from Last 3 Encounters:   11/06/23 108/72   10/23/23 118/74   09/27/23 125/80     Preferred Pharmacy:   [facility] - [place], [place] - [address]  [facility] [address]  [place] [address]  Phone: [phone] Fax: [phone]    Requested RX:  Requested Prescriptions     Pending Prescriptions Disp Refills    diazePAM (VALIUM) 2 MG tablet [Pharmacy Med Name: diazepam 2 mg tablet] 60 tablet 2     Sig: TAKE ONE TABLET BY MOUTH EVERY 8 HOURS AS NEEDED FOR ANXIETY      RX Route: Normal

## 2024-08-19 NOTE — TELEPHONE ENCOUNTER
No care due was identified.  [facility] Embedded Care Due Messages. Reference number: [id number].   8/19/2024 2:55:22 PM CDT

## 2024-08-21 DIAGNOSIS — E78.5 HYPERLIPIDEMIA, UNSPECIFIED HYPERLIPIDEMIA TYPE: ICD-10-CM

## 2024-08-21 DIAGNOSIS — F32.A DEPRESSION, UNSPECIFIED DEPRESSION TYPE: ICD-10-CM

## 2024-08-21 RX ORDER — DIAZEPAM 2 MG/1
2 TABLET ORAL EVERY 8 HOURS PRN
Qty: 60 TABLET | Refills: 2 | Status: SHIPPED | OUTPATIENT
Start: 2024-08-21

## 2024-08-21 RX ORDER — ATORVASTATIN CALCIUM 40 MG/1
40 TABLET, FILM COATED ORAL DAILY
Qty: 90 TABLET | Refills: 3 | Status: SHIPPED | OUTPATIENT
Start: 2024-08-21

## 2024-08-21 RX ORDER — DIAZEPAM 2 MG/1
2 TABLET ORAL EVERY 8 HOURS PRN
Qty: 60 TABLET | Refills: 2 | OUTPATIENT
Start: 2024-08-21

## 2024-08-21 RX ORDER — SOLIFENACIN SUCCINATE 5 MG/1
5 TABLET, FILM COATED ORAL 2 TIMES DAILY
Qty: 60 TABLET | Refills: 3 | Status: SHIPPED | OUTPATIENT
Start: 2024-08-21

## 2024-08-21 NOTE — TELEPHONE ENCOUNTER
No care due was identified.  [facility] Embedded Care Due Messages. Reference number: [id number].   8/21/2024 11:29:16 AM CDT

## 2024-10-27 DIAGNOSIS — F32.A DEPRESSION, UNSPECIFIED DEPRESSION TYPE: ICD-10-CM

## 2024-10-28 RX ORDER — DIAZEPAM 2 MG/1
2 TABLET ORAL EVERY 8 HOURS PRN
Qty: 60 TABLET | Refills: 2 | Status: SHIPPED | OUTPATIENT
Start: 2024-10-28

## 2024-10-29 DIAGNOSIS — I10 BENIGN ESSENTIAL HYPERTENSION: ICD-10-CM

## 2024-10-29 RX ORDER — AMLODIPINE BESYLATE 5 MG/1
TABLET ORAL
Qty: 90 TABLET | Refills: 0 | Status: SHIPPED | OUTPATIENT
Start: 2024-10-29

## 2024-11-03 ENCOUNTER — PATIENT MESSAGE (OUTPATIENT)
Dept: UROGYNECOLOGY | Facility: CLINIC | Age: 69
End: 2024-11-03
Payer: MEDICARE

## 2024-11-03 DIAGNOSIS — M94.9 DISORDER OF BONE AND CARTILAGE: Primary | ICD-10-CM

## 2024-11-03 DIAGNOSIS — Z78.0 ASYMPTOMATIC MENOPAUSAL STATE: ICD-10-CM

## 2024-11-03 DIAGNOSIS — M89.9 DISORDER OF BONE AND CARTILAGE: Primary | ICD-10-CM

## 2024-11-04 ENCOUNTER — PATIENT MESSAGE (OUTPATIENT)
Dept: ADMINISTRATIVE | Facility: HOSPITAL | Age: 69
End: 2024-11-04
Payer: MEDICARE

## 2024-11-05 ENCOUNTER — PATIENT OUTREACH (OUTPATIENT)
Dept: ADMINISTRATIVE | Facility: HOSPITAL | Age: 69
End: 2024-11-05
Payer: MEDICARE

## 2024-11-05 ENCOUNTER — TELEPHONE (OUTPATIENT)
Dept: GASTROENTEROLOGY | Facility: CLINIC | Age: 69
End: 2024-11-05
Payer: MEDICARE

## 2024-11-05 DIAGNOSIS — Z12.11 SCREENING FOR COLON CANCER: ICD-10-CM

## 2024-11-05 DIAGNOSIS — Z86.0100 HISTORY OF COLON POLYPS: Primary | ICD-10-CM

## 2024-11-05 NOTE — TELEPHONE ENCOUNTER
----- Message from Tanya sent at 11/5/2024  8:32 AM CST -----  Type:  Sooner Apoointment Request    Caller is requesting a sooner appointment.  Caller declined first available appointment listed below.  Caller will not accept being placed on the waitlist and is requesting a message be sent to doctor.  Name of Caller: Pt  When is the first available appointment?  Symptoms: Colonoscopy  Would the patient rather a call back or a response via [facility]? Call  Best Call Back Number:  [phone]  Additional Information:

## 2024-11-06 DIAGNOSIS — F32.A DEPRESSION, UNSPECIFIED DEPRESSION TYPE: ICD-10-CM

## 2024-11-06 RX ORDER — DIAZEPAM 2 MG/1
2 TABLET ORAL EVERY 8 HOURS PRN
Qty: 60 TABLET | Refills: 0 | Status: SHIPPED | OUTPATIENT
Start: 2024-11-06

## 2024-11-06 NOTE — TELEPHONE ENCOUNTER
Care Due:                  Date            Visit Type   Department     Provider  --------------------------------------------------------------------------------                                EP -                              PRIMARY      [facility] INTERNAL  Last Visit: 11-      CARE ([facility])   MEDICINE       Jean Carlos Samuels  Next Visit: None Scheduled  None         None Found                                                            Last  Test          Frequency    Reason                     Performed    Due Date  --------------------------------------------------------------------------------    Office Visit  15 months..  atorvastatin, citalopram.  11- 01-    [facility] Embedded Care Due Messages. Reference number: [id number].   11/06/2024 7:53:14 AM CST

## 2024-11-07 ENCOUNTER — HOSPITAL ENCOUNTER (OUTPATIENT)
Dept: RADIOLOGY | Facility: HOSPITAL | Age: 69
Discharge: HOME | End: 2024-11-07
Attending: NURSE PRACTITIONER
Payer: MEDICARE

## 2024-11-07 DIAGNOSIS — M89.9 DISORDER OF BONE AND CARTILAGE: ICD-10-CM

## 2024-11-07 DIAGNOSIS — M94.9 DISORDER OF BONE AND CARTILAGE: ICD-10-CM

## 2024-11-07 DIAGNOSIS — Z78.0 ASYMPTOMATIC MENOPAUSAL STATE: ICD-10-CM

## 2024-11-07 PROCEDURE — 77080 DXA BONE DENSITY AXIAL: CPT | Mod: 26,,, | Performed by: RADIOLOGY

## 2024-11-07 PROCEDURE — 77091 TBS TECHL CALCULATION ONLY: CPT

## 2024-11-07 PROCEDURE — 77092 TBS I&R FX RSK QHP: CPT | Mod: ,,, | Performed by: RADIOLOGY

## 2024-11-13 ENCOUNTER — PATIENT MESSAGE (OUTPATIENT)
Dept: ADMINISTRATIVE | Facility: HOSPITAL | Age: 69
End: 2024-11-13
Payer: MEDICARE

## 2024-11-20 ENCOUNTER — OFFICE VISIT (OUTPATIENT)
Dept: UROGYNECOLOGY | Facility: CLINIC | Age: 69
End: 2024-11-20
Payer: MEDICARE

## 2024-11-20 VITALS
HEIGHT: 62 IN | HEART RATE: 59 BPM | SYSTOLIC BLOOD PRESSURE: 116 MMHG | WEIGHT: 181.44 LBS | BODY MASS INDEX: 33.39 KG/M2 | DIASTOLIC BLOOD PRESSURE: 61 MMHG

## 2024-11-20 DIAGNOSIS — L90.0 LICHEN SCLEROSUS: ICD-10-CM

## 2024-11-20 DIAGNOSIS — R15.9 FULL INCONTINENCE OF FECES: ICD-10-CM

## 2024-11-20 DIAGNOSIS — N39.46 MIXED INCONTINENCE URGE AND STRESS: Primary | ICD-10-CM

## 2024-11-20 PROCEDURE — 3288F FALL RISK ASSESSMENT DOCD: CPT | Mod: CPTII,S$GLB,, | Performed by: OBSTETRICS & GYNECOLOGY

## 2024-11-20 PROCEDURE — 1159F MED LIST DOCD IN RCRD: CPT | Mod: CPTII,S$GLB,, | Performed by: OBSTETRICS & GYNECOLOGY

## 2024-11-20 PROCEDURE — 3074F SYST BP LT 130 MM HG: CPT | Mod: CPTII,S$GLB,, | Performed by: OBSTETRICS & GYNECOLOGY

## 2024-11-20 PROCEDURE — 99213 OFFICE O/P EST LOW 20 MIN: CPT | Mod: S$GLB,,, | Performed by: OBSTETRICS & GYNECOLOGY

## 2024-11-20 PROCEDURE — 3078F DIAST BP <80 MM HG: CPT | Mod: CPTII,S$GLB,, | Performed by: OBSTETRICS & GYNECOLOGY

## 2024-11-20 PROCEDURE — 99999 PR PBB SHADOW E&M-EST. PATIENT-LVL IV: CPT | Mod: PBBFAC,,, | Performed by: OBSTETRICS & GYNECOLOGY

## 2024-11-20 PROCEDURE — 3008F BODY MASS INDEX DOCD: CPT | Mod: CPTII,S$GLB,, | Performed by: OBSTETRICS & GYNECOLOGY

## 2024-11-20 PROCEDURE — 1101F PT FALLS ASSESS-DOCD LE1/YR: CPT | Mod: CPTII,S$GLB,, | Performed by: OBSTETRICS & GYNECOLOGY

## 2024-11-20 RX ORDER — CONJUGATED ESTROGENS 0.62 MG/G
CREAM VAGINAL
Qty: 45 G | Refills: 12 | Status: SHIPPED | OUTPATIENT
Start: 2024-11-20

## 2024-11-20 RX ORDER — CLOBETASOL PROPIONATE 0.5 MG/G
CREAM TOPICAL 2 TIMES DAILY
Qty: 60 G | Refills: 1 | Status: SHIPPED | OUTPATIENT
Start: 2024-11-20

## 2024-11-20 RX ORDER — DIPHENOXYLATE HYDROCHLORIDE AND ATROPINE SULFATE 2.5; .025 MG/1; MG/1
1 TABLET ORAL 4 TIMES DAILY PRN
Qty: 30 TABLET | Refills: 3 | Status: SHIPPED | OUTPATIENT
Start: 2024-11-20 | End: 2024-11-30

## 2024-11-20 NOTE — PROGRESS NOTES
Urogyn follow up  11/20/2024    .  [facility] - UROGYNECOLOGY  [address] [id number]    Lucas Mayer  [id number]  1955      Lucas Mayer is a 69 y.o. here for a urogyn follow up for urge incontinence.    HPI 69 y.o.Y O F  has a past medical history of Cataract, Depression, Hypertension, Other and unspecified hyperlipidemia, Trichiasis, and Vertigo.  Referred by Dr. Samuels  for evaluation of urinary incontinence. This has been an issue since 2000. She feels that the symptoms overall have worsened since her hysterectomy in 2000. She now changes pads 2/3 times per day has both stress and urge incontinence- especially at capacity.      10/20/2022  80 % improvement in urinary symptoms   No longer wears a pad during the day does wear one at night -wakes up at 5:30 in the am   Using the vaginal estrogen irregularly      11/20/2024  Using Vesicare with 25-50% improvements   Bothersome dry eyes, dry mouth          [facility]     Question 6/15/2022 11:48 AM CDT - Filed by Abigail Spears MA   General Urogynecology: Are you experiencing the following?     Dysuria (painful urination) Yes   Nocturia:  waking up at night to empty your bladder  Yes   If you answered yes to the previous question, how many times does this happen per night? 1-2   Enuresis (urine loss during sleep) No   Dribbling urine after you urinate Yes   Hematuria (urine appears red) No   Type of stream Interrupted   Urinary frequency: How often a day are you going to the bathroom per day?  Less than 10   Urinary Tract Infections: How many Urinary Tract Infections have you had in the past year? I have not had a UTI in the past year   If you have had a UTI in the past year, what treatments have you had so far?  Cranberry supplementation     I have not had a UTI in the past year   Urinary Incontinence (General): Are you experiencing the following?     Past consultation for incontinence: Have you ever seen  "someone for the evaluation of incontinence? Yes   If you answered yes to the previous question, please select all the therapies you have tried.  [facility]     Pelvic floor physical therapy   Please note the effectiveness of the therapies. Somewhat effective   Need to wear protection to keep clothes dry  Yes   If you answered yes to the previous question, please eduardo the protection you use.  Panty liner   If you wear protection, how much wetness is typically on each pad? Slight   If you wear protection, how often do you have to change per day, if applicable?  2   Stress Symptoms: Are you experiencing the following?     Leakage of urine with cough, laugh and/or sneeze Yes   If you answered yes to the previous question, what is the frequency in days, weeks and/or months? Daily   Leakage of urine with sex No   Leakage of urine with bending/ lifting Yes   Leakage of urine with briskly walking or jogging Yes   If you lose urine for any other reason not previously mentioned, please note it below, if applicable.  just seeing a toilet will make make me have to go   Urge Symptoms: Are you experiencing the following?     Urgency ("got to go" feeling) Yes   Urge: How frequently do you feel an urge to urinate (feeling like you "gotta go" to the bathroom and can't wait) Daily   Do you experience a leakage of urine when you have a feeling of urgency?  Yes   Leakage of urine when unaware Yes   Past use of anticholinergics (medications used to treat overactive bladder) No   If you answered yes to the previous question, please eduardo the anticholinergics you have used:      Have you ever used Mirbetriq (aka Mirabegron)?  No   Prolapse Symptoms: Are you experiencing any of the following?      Falling out/ Bulging/ Heaviness in the vagina Yes   Vaginal/ Abdominal Pain/ Pressure Yes   Need to strain/ Push to void Yes   Need to wait on the toilet before you void Yes   Unusual position to urinate (using your hands to push back the vaginal " bulge) No   Sensation of incomplete emptying Yes   Past use of pessary device No   If you answered yes to the previous question, please list the devices you have used below.      Bowel Symptoms: Are you experiencing any of the following?     Constipation No   Diarrhea  Yes   Hematochezia (bloody stool) No   Incomplete evacuation of stool Yes   Involuntary loss of formed stool Yes   Fecal smearing/urgency Yes   Involuntary loss of gas Yes   Vaginal Symptoms: Are you experiencing any of the following?      Abnormal vaginal bleeding  No   Vaginal dryness No   Sexually active  No   Dyspareunia (painful intercourse) No   Estrogen use  No           Changes from last visit:  1.  Mixed urinary incontinence, urge > stress:   --has worsening incontinence/ frequency with vesicare twice daily  --stopped second dose  --voiding every 2 hours during the day   --nocturia 1/  night (early AM0  --using liner pad -- 1/ day  --did not go to pelvic PT-- only went for eval    --STRESS:  Pessary vs. Sling.       2. Fecal incontinence: improved   --using fiberwell and align  --still has some smearing 2-3 times/ week     3. Lichens Sclerosis   --using clobetasol intermittently     4. Vaginal atrophy (dryness):     --has not been using regularly    Past Medical History:   Diagnosis Date    Cataract     Depression     Hypertension     Other and unspecified hyperlipidemia     Trichiasis     Vertigo        Past Surgical History:   Procedure Laterality Date    CATARACT EXTRACTION W/  INTRAOCULAR LENS IMPLANT Left 12/03/2018    Dr Foreman     CATARACT EXTRACTION W/  INTRAOCULAR LENS IMPLANT Left 12/3/2018    Procedure: EXTRACTION, CATARACT, WITH IOL INSERTION;  Surgeon: Emilie Foreman MD;  Location: [facility] OR;  Service: Ophthalmology;  Laterality: Left;    CATARACT EXTRACTION W/  INTRAOCULAR LENS IMPLANT Right 2/4/2019    Procedure: EXTRACTION, CATARACT, WITH IOL INSERTION;  Surgeon: Emilie Foreman MD;  Location: [facility] OR;  Service: Ophthalmology;   Laterality: Right;    HYSTERECTOMY      fibroids- BK/BSO    OOPHORECTOMY      TONSILLECTOMY      WISDOM TOOTH EXTRACTION         Family History   Problem Relation Name Age of Onset    Diabetes Mother      Stroke Mother      Essential Tremor Mother      Melanoma Father      Hypertension Father      Essential Tremor Sister      Essential Tremor Sister      Breast cancer Neg Hx      Cancer Neg Hx      Eclampsia Neg Hx         Social History     Socioeconomic History    Marital status:    Tobacco Use    Smoking status: Never    Smokeless tobacco: Never   Substance and Sexual Activity    Alcohol use: Yes     Alcohol/week: 0.8 standard drinks of alcohol     Types: 1 Standard drinks or equivalent per week     Comment: Social    Drug use: No    Sexual activity: Not Currently     Partners: Male     Social Drivers of Health     Financial Resource Strain: Low Risk  (2021)    Overall Financial Resource Strain (CARDIA)     Difficulty of Paying Living Expenses: Not hard at all   Food Insecurity: No Food Insecurity (2021)    Hunger Vital Sign     Worried About Running Out of Food in the Last Year: Never true     Ran Out of Food in the Last Year: Never true   Transportation Needs: No Transportation Needs (2021)    PRAPARE - Transportation     Lack of Transportation (Medical): No     Lack of Transportation (Non-Medical): No   Physical Activity: Unknown (2021)    Exercise Vital Sign     Days of Exercise per Week: 0 days   Stress: No Stress Concern Present (2021)    [employer] [place] [place] [employer] - Occupational Stress Questionnaire     Feeling of Stress : Not at all       Current Outpatient Medications   Medication Sig Dispense Refill    albuterol (PROVENTIL/VENTOLIN HFA) 90 mcg/actuation inhaler SMARTSI Puff(s) Via Inhaler Every 4-6 Hours PRN (Patient not taking: Reported on 2024)      amLODIPine (NORVASC) 5 MG tablet TAKE 1 TABLET BY MOUTH ONCE  DAILY 90 tablet 0     "atorvastatin (LIPITOR) 40 MG tablet Take 1 tablet (40 mg total) by mouth once daily. 90 tablet 3    calcium citrate-vitamin D3 315-200 mg (CITRACAL+D) 315 mg-5 mcg (200 unit) per tablet Take 1 tablet by mouth 2 (two) times daily. (Patient not taking: Reported on 4/25/2024)      citalopram (CELEXA) 20 MG tablet TAKE 1 TABLET BY MOUTH ONCE  DAILY 90 tablet 2    clobetasoL (TEMOVATE) 0.05 % cream Apply topically 2 (two) times daily. 60 g 1    conjugated estrogens (PREMARIN) vaginal cream 1 g with applicator or dime-sized amt with finger in vagina nightly x 2 weeks, then twice a week thereafter 45 g 12    diazePAM (VALIUM) 2 MG tablet Take 1 tablet (2 mg total) by mouth every 8 (eight) hours as needed for Anxiety. 60 tablet 0    diazePAM (VALIUM) 5 MG tablet Take 1 tablet by mouth at time of arrival to appointment (Patient not taking: Reported on 4/25/2024) 1 tablet 0    diphenoxylate-atropine 2.5-0.025 mg (LOMOTIL) 2.5-0.025 mg per tablet Take 1 tablet by mouth 4 (four) times daily as needed for Diarrhea. 30 tablet 3    solifenacin (VESICARE) 5 MG tablet Take 1 tablet (5 mg total) by mouth 2 (two) times a day. Patient has not been seen by this clinic in over 1 year. Needs appointment for refills. 60 tablet 3    UNABLE TO FIND Take 5 g by mouth 2 (two) times daily. medication name: Vitafusion Fiber Well sugar free gummies      vibegron 75 mg Tab Take 1 tablet (75 mg total) by mouth nightly. 30 tablet 11     No current facility-administered medications for this visit.       Review of patient's allergies indicates:   Allergen Reactions    Ancef [cefazolin] Anaphylaxis    Codeine Nausea And Vomiting    Pcn [penicillins] Rash       Well woman:  Pap's: No history of abnormal, none recently   Mammo: BIRADS: 1; Last imaging  2021  Colonoscopy: N/a: normal   Dexa: Normal bone mineral density 2022    ROS:  As per HPI.      Exam  /61   Pulse (!) 59   Ht 5' 2" (1.575 m)   Wt 82.3 kg (181 lb 7 oz)   BMI 33.19 kg/m² "   General: alert and oriented, no acute distress  Respiratory: normal respiratory effort  Abd: soft, non-tender, non-distended    Pelvic--deferred    Impression  1. Mixed incontinence urge and stress  Ambulatory referral/consult to Physical/Occupational Therapy      2. Full incontinence of feces  diphenoxylate-atropine 2.5-0.025 mg (LOMOTIL) 2.5-0.025 mg per tablet    Ambulatory referral/consult to Physical/Occupational Therapy      3. Lichen sclerosus            We reviewed the above issues and discussed options for short-term versus long-term management of her problems.     Plan:   1.  Mixed urinary incontinence, urge > stress:   --Bladder diary for 3-7 days, write the number of times you go to the rest room and associated symptoms of urgency or leakage.   --Empty bladder every 3 hours.  Empty well: wait a minute, lean forward on toilet.    --Avoid dietary irritants (see sheet).  Keep diary x 3-5 days to determine your irritants.  --KEGELS: do 10 in AM and 10 in PM, holding each x 10 seconds.  When you feel urge to go, STOP, KEGEL, and when urge has passed, then go to bathroom.  Start pelvic floor  PT (wants to have the PT in [place]) .    --URGE:stop vesicare 5 mg daily, start [id number] mg daily  SE profile reviewed.   Takes 2-4 weeks to see if will have effect.  For dry mouth: get sour, sugar free lozenge or gum.    --STRESS:  Pessary vs. Sling.    --SUDS if no improvement      2. Fecal incontinence:  --Start Lomotil as needed   If no improvement recommend CR evaluation      3. Lichens Sclerosis   --use clobetasol as needed --twice weekly  Ok to use along the perineum      4.Vaginal atrophy (dryness):  Use 1 gram of estrogen cream in vagina nightly x 2 weeks, then twice a week thereafter.      Isabel Sweet DO  Female Pelvic Medicine and Reconstructive Surgery  [facility] [place], [place]

## 2024-11-22 ENCOUNTER — PATIENT MESSAGE (OUTPATIENT)
Dept: UROGYNECOLOGY | Facility: CLINIC | Age: 69
End: 2024-11-22
Payer: MEDICARE

## 2024-11-26 ENCOUNTER — TELEPHONE (OUTPATIENT)
Dept: INTERNAL MEDICINE | Facility: CLINIC | Age: 69
End: 2024-11-26
Payer: MEDICARE

## 2024-11-26 NOTE — TELEPHONE ENCOUNTER
Have her schedule in January       Message    Appointment Request From: Lucas Mayer      With Provider: Jean Carlos Samuels MD [Buddhism - Internal   Medicine]      Preferred Date Range: 12/9/2024 - 12/31/2024      Preferred Times: Any Time      Reason for visit: Annual Check-up      Comments:   Several dr appts in Nov & Dec so would like to see Dr   afterwards for review.  It would be great to do it before   end of the year, of course!

## 2024-11-27 ENCOUNTER — TELEPHONE (OUTPATIENT)
Dept: INTERNAL MEDICINE | Facility: CLINIC | Age: 69
End: 2024-11-27
Payer: MEDICARE

## 2024-12-03 DIAGNOSIS — R15.9 FULL INCONTINENCE OF FECES: ICD-10-CM

## 2024-12-03 NOTE — TELEPHONE ENCOUNTER
Refill Routing Note   Medication(s) are not appropriate for processing by [facility] for the following reason(s):        Outside of protocol  Clarification of medication (Rx) details    [facility] action(s):  Route               Appointments  past 12m or future 3m with PCP    Date Provider   Last Visit   11/20/2024 Isabel Sweet DO   Next Visit   5/14/2025 Isabel Sweet,    ED visits in past 90 days: 0        Note composed:1:56 PM 12/03/2024

## 2024-12-05 ENCOUNTER — OFFICE VISIT (OUTPATIENT)
Dept: OPTOMETRY | Facility: CLINIC | Age: 69
End: 2024-12-05
Payer: COMMERCIAL

## 2024-12-05 DIAGNOSIS — Z96.1 PSEUDOPHAKIA: ICD-10-CM

## 2024-12-05 DIAGNOSIS — Z01.00 EXAMINATION OF EYES AND VISION: Primary | ICD-10-CM

## 2024-12-05 DIAGNOSIS — H26.492 POSTERIOR CAPSULAR OPACIFICATION NON VISUALLY SIGNIFICANT OF LEFT EYE: ICD-10-CM

## 2024-12-05 DIAGNOSIS — H04.123 DRY EYE SYNDROME OF BOTH EYES: ICD-10-CM

## 2024-12-05 DIAGNOSIS — H52.7 REFRACTIVE ERROR: ICD-10-CM

## 2024-12-05 PROCEDURE — 92014 COMPRE OPH EXAM EST PT 1/>: CPT | Mod: S$GLB,,, | Performed by: OPTOMETRIST

## 2024-12-05 PROCEDURE — 99999 PR PBB SHADOW E&M-EST. PATIENT-LVL III: CPT | Mod: PBBFAC,,, | Performed by: OPTOMETRIST

## 2024-12-05 PROCEDURE — 92015 DETERMINE REFRACTIVE STATE: CPT | Mod: S$GLB,,, | Performed by: OPTOMETRIST

## 2024-12-05 NOTE — PROGRESS NOTES
"HPI     Annual Exam     Additional comments: DLE            Comments    Pt here today for ocular health exam.       States blurred vision at near only, distance vision good.  Happy with OTC readers.   Unable to tolerate bifocals in the past.    (+) headaches with dizziness from vertigo  (+) severe dry eyes -- burning, tearing, crusted in morning  (-) gtts --- advised ATS, gave drop sheet    Will get occasional sharp, shooting pains like quick "pin pricks" ---   thinks due to dryness.    Had bilateral bleph surgery -- upper lids            Last edited by Mickey Hernandez, OD on 12/5/2024  8:45 AM.            Assessment /Plan     For exam results, see Encounter Report.    Examination of eyes and vision    Dry eye syndrome of both eyes  -     Ambulatory referral/consult to Optometry    Pseudophakia    Posterior capsular opacification non visually significant of left eye    Refractive error      1. Examination of eyes and vision (Primary)    2. Dry eye syndrome of both eyes  Discussed ocular affects of dry eyes. Recommend OTC artificial tears 2-4 times a day in both eyes.   Discussed chronicity of SATISH.   RTC if symptoms not alleviated by continued use of artificial tears.     3. Pseudophakia  4. Posterior capsular opacification non visually significant of left eye  S/p cataract extraction OU  Mild PCO OS -- not VS  Observe     5. Refractive error  Happy with uncorrected distance and otc readers for near  Dispensed updated spec rx prn    RTC 1 year                    "

## 2024-12-11 ENCOUNTER — PATIENT MESSAGE (OUTPATIENT)
Dept: ADMINISTRATIVE | Facility: HOSPITAL | Age: 69
End: 2024-12-11
Payer: MEDICARE

## 2024-12-19 ENCOUNTER — ANESTHESIA EVENT (OUTPATIENT)
Dept: ENDOSCOPY | Facility: HOSPITAL | Age: 69
End: 2024-12-19
Payer: MEDICARE

## 2024-12-19 ENCOUNTER — ANESTHESIA (OUTPATIENT)
Dept: ENDOSCOPY | Facility: HOSPITAL | Age: 69
End: 2024-12-19
Payer: MEDICARE

## 2024-12-19 ENCOUNTER — HOSPITAL ENCOUNTER (OUTPATIENT)
Facility: HOSPITAL | Age: 69
Discharge: HOME | End: 2024-12-19
Attending: INTERNAL MEDICINE | Admitting: INTERNAL MEDICINE
Payer: MEDICARE

## 2024-12-19 VITALS
OXYGEN SATURATION: 98 % | DIASTOLIC BLOOD PRESSURE: 65 MMHG | WEIGHT: 181 LBS | SYSTOLIC BLOOD PRESSURE: 147 MMHG | BODY MASS INDEX: 33.11 KG/M2 | TEMPERATURE: 98 F | HEART RATE: 64 BPM | RESPIRATION RATE: 20 BRPM

## 2024-12-19 DIAGNOSIS — Z86.0100 HX OF COLONIC POLYPS: ICD-10-CM

## 2024-12-19 PROCEDURE — 45380 COLONOSCOPY AND BIOPSY: CPT | Mod: ,,, | Performed by: INTERNAL MEDICINE

## 2024-12-19 PROCEDURE — 37000009 HC ANESTHESIA EA ADD 15 MINS: Performed by: INTERNAL MEDICINE

## 2024-12-19 PROCEDURE — 88305 TISSUE EXAM BY PATHOLOGIST: CPT | Mod: TC,59 | Performed by: PATHOLOGY

## 2024-12-19 PROCEDURE — 25000003 PHARM REV CODE 250: Performed by: INTERNAL MEDICINE

## 2024-12-19 PROCEDURE — 27201012 HC FORCEPS, HOT/COLD, DISP: Performed by: INTERNAL MEDICINE

## 2024-12-19 PROCEDURE — 63600175 PHARM REV CODE 636 W HCPCS: Performed by: STUDENT IN AN ORGANIZED HEALTH CARE EDUCATION/TRAINING PROGRAM

## 2024-12-19 PROCEDURE — 37000008 HC ANESTHESIA 1ST 15 MINUTES: Performed by: INTERNAL MEDICINE

## 2024-12-19 PROCEDURE — 45380 COLONOSCOPY AND BIOPSY: CPT | Performed by: INTERNAL MEDICINE

## 2024-12-19 RX ORDER — SODIUM CHLORIDE 9 MG/ML
INJECTION, SOLUTION INTRAVENOUS CONTINUOUS
Status: DISCONTINUED | OUTPATIENT
Start: 2024-12-19 | End: 2024-12-19 | Stop reason: HOSPADM

## 2024-12-19 RX ORDER — PROPOFOL 10 MG/ML
INJECTION, EMULSION INTRAVENOUS
Status: COMPLETED
Start: 2024-12-19 | End: 2024-12-19

## 2024-12-19 RX ORDER — LIDOCAINE HYDROCHLORIDE 20 MG/ML
INJECTION INTRAVENOUS
Status: DISCONTINUED | OUTPATIENT
Start: 2024-12-19 | End: 2024-12-19

## 2024-12-19 RX ORDER — LIDOCAINE HYDROCHLORIDE 20 MG/ML
INJECTION, SOLUTION EPIDURAL; INFILTRATION; INTRACAUDAL; PERINEURAL
Status: DISCONTINUED
Start: 2024-12-19 | End: 2024-12-19 | Stop reason: HOSPADM

## 2024-12-19 RX ORDER — PROPOFOL 10 MG/ML
VIAL (ML) INTRAVENOUS
Status: DISCONTINUED | OUTPATIENT
Start: 2024-12-19 | End: 2024-12-19

## 2024-12-19 RX ORDER — GLYCOPYRROLATE 0.2 MG/ML
INJECTION INTRAMUSCULAR; INTRAVENOUS
Status: COMPLETED
Start: 2024-12-19 | End: 2024-12-19

## 2024-12-19 RX ADMIN — PROPOFOL 50 MG: 10 INJECTION, EMULSION INTRAVENOUS at 11:12

## 2024-12-19 RX ADMIN — LIDOCAINE HYDROCHLORIDE 20 MG: 20 INJECTION, SOLUTION INTRAVENOUS at 11:12

## 2024-12-19 RX ADMIN — GLYCOPYRROLATE 0.1 MG: 0.2 INJECTION, SOLUTION INTRAMUSCULAR; INTRAVITREAL at 11:12

## 2024-12-19 RX ADMIN — PROPOFOL 50 MG: 10 INJECTION, EMULSION INTRAVENOUS at 10:12

## 2024-12-19 RX ADMIN — GLYCOPYRROLATE 0.1 MG: 0.2 INJECTION, SOLUTION INTRAMUSCULAR; INTRAVITREAL at 10:12

## 2024-12-19 RX ADMIN — LIDOCAINE HYDROCHLORIDE 50 MG: 20 INJECTION, SOLUTION INTRAVENOUS at 10:12

## 2024-12-19 RX ADMIN — SODIUM CHLORIDE: 9 INJECTION, SOLUTION INTRAVENOUS at 08:12

## 2024-12-19 NOTE — TRANSFER OF CARE
Anesthesia Transfer of Care Note    Patient: Lucas Mayer    Procedure(s) Performed: Procedure(s) (LRB):  COLONOSCOPY, SCREENING, HIGH RISK PATIENT (N/A)    Patient location: GI    Anesthesia Type: general    Transport from OR: Transported from OR on room air with adequate spontaneous ventilation    Post pain: adequate analgesia    Post assessment: no apparent anesthetic complications    Post vital signs: stable    Level of consciousness: lethargic and responds to stimulation    Nausea/Vomiting: no nausea/vomiting    Complications: none    Transfer of care protocol was followed      Last vitals: Visit Vitals  /67   Pulse (!) 48   Temp 36.7 °C (98.1 °F) (Skin)   Resp 16   Wt 82.1 kg (181 lb)   SpO2 99%   Breastfeeding No   BMI 33.11 kg/m²

## 2024-12-19 NOTE — ANESTHESIA POSTPROCEDURE EVALUATION
Anesthesia Post Evaluation    Patient: Lucas Mayer    Procedure(s) Performed: Procedure(s) (LRB):  COLONOSCOPY, SCREENING, HIGH RISK PATIENT (N/A)    Final Anesthesia Type: general      Patient location during evaluation: PACU  Patient participation: Yes- Able to Participate  Level of consciousness: sedated and awake  Post-procedure vital signs: reviewed and stable  Pain management: adequate  Airway patency: patent    PONV status at discharge: No PONV  Anesthetic complications: no      Cardiovascular status: blood pressure returned to baseline, hemodynamically stable and hypertensive  Respiratory status: spontaneous ventilation  Hydration status: euvolemic  Follow-up not needed.              Vitals Value Taken Time   /65 12/19/24 1130   Temp 36.4 °C (97.6 °F) 12/19/24 1115   Pulse 64 12/19/24 1130   Resp 20 12/19/24 1130   SpO2 98 % 12/19/24 1130         Event Time   Out of Recovery 11:57:15         Pain/Mouna Score: Mouna Score: 10 (12/19/2024 11:30 AM)

## 2024-12-19 NOTE — ANESTHESIA PREPROCEDURE EVALUATION
12/19/2024  Lucas Mayer is a 69 y.o., female.    Anesthesia Evaluation    I have reviewed the Patient Summary Reports.     I have reviewed the Nursing Notes. I have reviewed the NPO Status.   I have reviewed the Medications.     Review of Systems  Anesthesia Hx:   History of prior surgery of interest to airway management or planning:  Previous anesthesia: MAC       12/18 phaco, rec'd versed 4mg, did well with MAC.   Denies Family Hx of Anesthesia complications.   Personal Hx of Anesthesia complications          Slow To Awaken/Delayed Emergence          Social:  Non-Smoker       Hematology/Oncology:    Oncology Normal                                   EENT/Dental:   CN 4 palsy          Cardiovascular:     Hypertension           hyperlipidemia                               Pulmonary:  Pulmonary Normal                       Renal/:  Renal/ Normal                 Hepatic/GI:  Hepatic/GI Normal Bowel Prep.                   Neurological:           Vertigo                            Psych:    depression                Physical Exam  General:  Obesity       Airway/Jaw/Neck:  Airway Findings: Mouth Opening: Normal     Tongue: Normal      General Airway Assessment: Adult      Mallampati: II   TM Distance: Normal, at least 6 cm                     Mental Status:  Mental Status Findings:  Alert and Oriented, Cooperative         Anesthesia Plan  Type of Anesthesia, risks & benefits discussed:  Anesthesia Type:  general    Patient's Preference:   Plan Factors:          Intra-op Monitoring Plan: standard ASA monitors  Intra-op Monitoring Plan Comments:   Post Op Pain Control Plan:   Post Op Pain Control Plan Comments:     Induction:    Beta Blocker:         Informed Consent: Informed consent signed with the Patient and all parties understand the risks and agree with anesthesia plan.  All questions answered.   Anesthesia consent signed with patient.  ASA Score: 2     Day of Surgery Review of History & Physical:  There are no significant changes.            Ready For Surgery From Anesthesia Perspective.             Physical Exam  General: Obesity    Airway:  Mallampati: II   Mouth Opening: Normal  TM Distance: Normal, at least 6 cm  Tongue: Normal        Anesthesia Plan  Type of Anesthesia, risks & benefits discussed:    Anesthesia Type: general  Intra-op Monitoring Plan: standard ASA monitors  Informed Consent: Informed consent signed with the Patient and all parties understand the risks and agree with anesthesia plan.  All questions answered.   ASA Score: 2    Ready For Surgery From Anesthesia Perspective.     .

## 2024-12-19 NOTE — PROVATION PATIENT INSTRUCTIONS
Discharge Summary/Instructions after an Endoscopic Procedure  Patient Name: Lucas Mayer  Patient MRN: [id number]  Patient YOB: 1955 Thursday, December 19, 2024  Hernandez Chaudhari MD  Dear patient,  As a result of recent federal legislation (The Federal Cures Act), you may   receive lab or pathology results from your procedure in your [facility]   account before your physician is able to contact you. Your physician or   their representative will relay the results to you with their   recommendations at their soonest availability.  Thank you,  RESTRICTIONS:  During your procedure today, you received medications for sedation.  These   medications may affect your judgment, balance and coordination.  Therefore,   for 24 hours, you have the following restrictions:   - DO NOT drive a car, operate machinery, make legal/financial decisions,   sign important papers or drink alcohol.    ACTIVITY:  Today: no heavy lifting, straining or running due to procedural   sedation/anesthesia.  The following day: return to full activity including work.  DIET:  Eat and drink normally unless instructed otherwise.     TREATMENT FOR COMMON SIDE EFFECTS:  - Mild abdominal pain, nausea, belching, bloating or excessive gas:  rest,   eat lightly and use a heating pad.  - Sore Throat: treat with throat lozenges and/or gargle with warm salt   water.  - Because air was used during the procedure, expelling large amounts of air   from your rectum or belching is normal.  - If a bowel prep was taken, you may not have a bowel movement for 1-3 days.    This is normal.  SYMPTOMS TO WATCH FOR AND REPORT TO YOUR PHYSICIAN:  1. Abdominal pain or bloating, other than gas cramps.  2. Chest pain.  3. Back pain.  4. Signs of infection such as: chills or fever occurring within 24 hours   after the procedure.  5. Rectal bleeding, which would show as bright red, maroon, or black stools.   (A tablespoon of blood from the rectum is not serious, especially  if   hemorrhoids are present.)  6. Vomiting.  7. Weakness or dizziness.  GO DIRECTLY TO THE NEAREST EMERGENCY ROOM IF YOU HAVE ANY OF THE FOLLOWING:      Difficulty breathing              Chills and/or fever over 101 F   Persistent vomiting and/or vomiting blood   Severe abdominal pain   Severe chest pain   Black, tarry stools   Bleeding- more than one tablespoon   Any other symptom or condition that you feel may need urgent attention  Your doctor recommends these additional instructions:  If any biopsies were taken, your doctors clinic will contact you in 1 to 2   weeks with any results.  - Patient has a contact number available for emergencies.  The signs and   symptoms of potential delayed complications were discussed with the   patient.  Return to normal activities tomorrow.  Written discharge   instructions were provided to the patient.   - High fiber diet.   - Continue present medications.   - Await pathology results.   - Repeat colonoscopy in 5 years for surveillance.   - Discharge patient to home (ambulatory).   - Return to GI office after studies are complete.  For questions, problems or results please call your physician - Hernandez Chaudhari MD at Work:  [phone].  [facility], EMERGENCY ROOM PHONE NUMBER: [phone]  IF A COMPLICATION OR EMERGENCY SITUATION ARISES AND YOU ARE UNABLE TO REACH   YOUR PHYSICIAN - GO DIRECTLY TO THE EMERGENCY ROOM.  Hernandez Chaudhari MD  12/19/2024 11:11:13 AM  This report has been verified and signed electronically.  Dear patient,  As a result of recent federal legislation (The Federal Cures Act), you may   receive lab or pathology results from your procedure in your [facility]   account before your physician is able to contact you. Your physician or   their representative will relay the results to you with their   recommendations at their soonest availability.  Thank you,  [employer]ATION

## 2024-12-19 NOTE — H&P
CC: History of colon polyps    69 year old female with above. States that symptoms are absent, no alleviating/exacerbating factors. No family history of colorectal CA. Positive personal history of polyps. No bleeding or weight loss.     ROS:  No headache, no fever/chills, no chest pain/SOB, no nausea/vomiting/diarrhea/constipation/GI bleeding/abdominal pain, no dysuria/hematuria.    VSSAF   Exam:   Alert and oriented x 3; no apparent distress   PERRLA, sclera anicteric  CV: Regular rate/rhythm, normal PMI   Lungs: Clear bilaterally with no wheeze/rales   Abdomen: Soft, NT/ND, normal bowel sounds   Ext: No cyanosis, clubbing     Impression:   As above    Plan:   Proceed with endoscopy. Further recs to follow.

## 2024-12-20 ENCOUNTER — TELEPHONE (OUTPATIENT)
Dept: GASTROENTEROLOGY | Facility: CLINIC | Age: 69
End: 2024-12-20
Payer: MEDICARE

## 2024-12-20 RX ORDER — BUDESONIDE 3 MG/1
9 CAPSULE, COATED PELLETS ORAL DAILY
Qty: 90 CAPSULE | Refills: 0 | Status: SHIPPED | OUTPATIENT
Start: 2024-12-20 | End: 2025-01-19

## 2024-12-24 ENCOUNTER — PATIENT OUTREACH (OUTPATIENT)
Dept: ADMINISTRATIVE | Facility: HOSPITAL | Age: 69
End: 2024-12-24
Payer: MEDICARE

## 2024-12-24 VITALS — SYSTOLIC BLOOD PRESSURE: 119 MMHG | DIASTOLIC BLOOD PRESSURE: 66 MMHG

## 2024-12-29 ENCOUNTER — PATIENT MESSAGE (OUTPATIENT)
Dept: INTERNAL MEDICINE | Facility: CLINIC | Age: 69
End: 2024-12-29
Payer: MEDICARE

## 2024-12-30 NOTE — TELEPHONE ENCOUNTER
"[employer] message sent. In-Basket message routed to Dr. Samuels.    "  You  Lucas MayerJust now (4:56 PM)     GS  Ms. Mayer,  You can call or message your GI specialists for any refills that they prescribe. Does the bottle from [facility] say if there are any refills on the Budesonide? I will message Dr. Samuels when he returns about reviewing your other medications.  Happy New Year,   EMILEE Flores, GERMAINEN     This [employer] message has not been read.     Lucas Mayer  P Arvind Evangelista Staff (supporting Jean Carlos Samuels MD)Yesterday (3:59 PM)       I had my colonoscopy done on Dec 19th. It shows colitis. Dr prescribed Budesonide, 3 caps once per day every morning with no refills, quantity 90 pills.  Unfortunately they ordered through [facility] and they just arrived yesterday (10 days later)! I will run out of pills before my next GI appt on Feb 26th. I don't see their notes in Epic.  I have an appt with you on Jan 24th.   The meds prescribed by the UroGyn are also new, Gemtesa (expensive) and Diphenoxylate-Atrop.  I think all my meds need to be reviewed to make sure there are no problems mixing them.  I'm going to message GI dr now to explain his pills since nothing was said to me about them after the procedure/biopsy results.         "  "

## 2025-01-02 RX ORDER — BUDESONIDE 3 MG/1
9 CAPSULE, COATED PELLETS ORAL DAILY
Qty: 90 CAPSULE | Refills: 0 | Status: SHIPPED | OUTPATIENT
Start: 2025-01-02 | End: 2025-02-01

## 2025-01-16 ENCOUNTER — TELEPHONE (OUTPATIENT)
Dept: INTERNAL MEDICINE | Facility: CLINIC | Age: 70
End: 2025-01-16
Payer: MEDICARE

## 2025-01-16 DIAGNOSIS — G47.33 OSA (OBSTRUCTIVE SLEEP APNEA): Primary | ICD-10-CM

## 2025-01-16 NOTE — TELEPHONE ENCOUNTER
Left voicemail to call us back for more details about the CPAP supplies order to associate the appropriate diagnosis code to the order.

## 2025-01-16 NOTE — TELEPHONE ENCOUNTER
Nirmala returned my call gave me diagnosis code G47.33(MORALES) & Fax#  [phone]. CPAP order pended

## 2025-01-16 NOTE — TELEPHONE ENCOUNTER
----- Message from [employer] sent at 1/15/2025  1:31 PM CST -----   Name of Who is Calling:     What is the request in detail:  request call back in reference to status of detailed written order for cpap supplies Please contact to further discuss and advise      Can the clinic reply by [facility]:     What Number [facility]: hema  / [facility] / [phone]

## 2025-01-21 ENCOUNTER — TELEPHONE (OUTPATIENT)
Dept: INTERNAL MEDICINE | Facility: CLINIC | Age: 70
End: 2025-01-21
Payer: MEDICARE

## 2025-01-21 NOTE — TELEPHONE ENCOUNTER
----- Message from [employer] sent at 1/21/2025 10:49 AM CST -----  Regarding: Cpap  Contact: [phone]  Good morning! Order is pending a valid Rx for a Cpap/AutoPap with a pressure setting and all supplies listed. Also will need current F2F/office visit to document what is wrong with current machine and why it needs to be replaced. Thanks! Lorin

## 2025-01-22 DIAGNOSIS — I10 BENIGN ESSENTIAL HYPERTENSION: ICD-10-CM

## 2025-01-22 RX ORDER — AMLODIPINE BESYLATE 5 MG/1
TABLET ORAL
Qty: 90 TABLET | Refills: 0 | Status: SHIPPED | OUTPATIENT
Start: 2025-01-22

## 2025-01-23 NOTE — TELEPHONE ENCOUNTER
Care Due:                  Date            Visit Type   Department     Provider  --------------------------------------------------------------------------------                                EP -                              PRIMARY      [facility] INTERNAL  Last Visit: 11-      CARE ([facility])   MEDICINE       Jean Carlos Samuels                              EP -                              PRIMARY      [facility] INTERNAL  Next Visit: 01-      CARE ([facility])   MEDICINE       Jean Carlos Samuels                                                            Last  Test          Frequency    Reason                     Performed    Due Date  --------------------------------------------------------------------------------    CMP.........  6 months...  atorvastatin, budesonide.  06- 12-    Lipid Panel.  12 months..  atorvastatin.............  06-   06-    [facility] Embedded Care Due Messages. Reference number: [id number].   1/22/2025 9:15:39 PM CST

## 2025-01-23 NOTE — TELEPHONE ENCOUNTER
Office visit scheduled for tomorrow. Patient aware that she needs to have Dr Samuels document necessity for new machine.

## 2025-01-23 NOTE — TELEPHONE ENCOUNTER
Provider Staff:  Action required for this patient     Please see care gap opportunities below in Care Due Message.    Thanks!  [facility]     Appointments      Date Provider   Last Visit   11/6/2023 Jean Carlos Samuels MD   Next Visit   1/24/2025 Jean Carlos Samuels MD      Refill Decision Note   Lucas Mayer  is requesting a refill authorization.  Brief Assessment and Rationale for Refill:  Approve     Medication Therapy Plan:         Comments:     Note composed:9:39 PM 01/22/2025

## 2025-01-24 ENCOUNTER — LAB VISIT (OUTPATIENT)
Dept: LAB | Facility: HOSPITAL | Age: 70
End: 2025-01-24
Attending: INTERNAL MEDICINE
Payer: MEDICARE

## 2025-01-24 DIAGNOSIS — Z00.00 ANNUAL PHYSICAL EXAM: ICD-10-CM

## 2025-01-24 DIAGNOSIS — I10 BENIGN HYPERTENSION: ICD-10-CM

## 2025-01-24 DIAGNOSIS — R79.9 ABNORMAL FINDING OF BLOOD CHEMISTRY, UNSPECIFIED: ICD-10-CM

## 2025-01-24 DIAGNOSIS — E78.00 PURE HYPERCHOLESTEROLEMIA: ICD-10-CM

## 2025-01-24 LAB
ALBUMIN SERPL BCP-MCNC: 3.5 G/DL (ref 3.5–5.2)
ALP SERPL-CCNC: 97 U/L (ref 40–150)
ALT SERPL W/O P-5'-P-CCNC: 16 U/L (ref 10–44)
ANION GAP SERPL CALC-SCNC: 9 MMOL/L (ref 8–16)
AST SERPL-CCNC: 13 U/L (ref 10–40)
BASOPHILS # BLD AUTO: 0.03 K/UL (ref 0–0.2)
BASOPHILS NFR BLD: 0.4 % (ref 0–1.9)
BILIRUB SERPL-MCNC: 0.9 MG/DL (ref 0.1–1)
BUN SERPL-MCNC: 15 MG/DL (ref 8–23)
CALCIUM SERPL-MCNC: 9.7 MG/DL (ref 8.7–10.5)
CHLORIDE SERPL-SCNC: 107 MMOL/L (ref 95–110)
CHOLEST SERPL-MCNC: 191 MG/DL (ref 120–199)
CHOLEST/HDLC SERPL: 2.7 {RATIO} (ref 2–5)
CO2 SERPL-SCNC: 25 MMOL/L (ref 23–29)
CREAT SERPL-MCNC: 0.7 MG/DL (ref 0.5–1.4)
DIFFERENTIAL METHOD BLD: ABNORMAL
EOSINOPHIL # BLD AUTO: 0.2 K/UL (ref 0–0.5)
EOSINOPHIL NFR BLD: 2.2 % (ref 0–8)
ERYTHROCYTE [DISTWIDTH] IN BLOOD BY AUTOMATED COUNT: 13 % (ref 11.5–14.5)
EST. GFR  (NO RACE VARIABLE): >60 ML/MIN/1.73 M^2
ESTIMATED AVG GLUCOSE: 105 MG/DL (ref 68–131)
GLUCOSE SERPL-MCNC: 94 MG/DL (ref 70–110)
HBA1C MFR BLD: 5.3 % (ref 4–5.6)
HCT VFR BLD AUTO: 42 % (ref 37–48.5)
HDLC SERPL-MCNC: 71 MG/DL (ref 40–75)
HDLC SERPL: 37.2 % (ref 20–50)
HGB BLD-MCNC: 13.7 G/DL (ref 12–16)
IMM GRANULOCYTES # BLD AUTO: 0.02 K/UL (ref 0–0.04)
IMM GRANULOCYTES NFR BLD AUTO: 0.3 % (ref 0–0.5)
LDLC SERPL CALC-MCNC: 103.4 MG/DL (ref 63–159)
LYMPHOCYTES # BLD AUTO: 1.8 K/UL (ref 1–4.8)
LYMPHOCYTES NFR BLD: 24.5 % (ref 18–48)
MCH RBC QN AUTO: 28.7 PG (ref 27–31)
MCHC RBC AUTO-ENTMCNC: 32.6 G/DL (ref 32–36)
MCV RBC AUTO: 88 FL (ref 82–98)
MONOCYTES # BLD AUTO: 0.5 K/UL (ref 0.3–1)
MONOCYTES NFR BLD: 6.6 % (ref 4–15)
NEUTROPHILS # BLD AUTO: 4.9 K/UL (ref 1.8–7.7)
NEUTROPHILS NFR BLD: 66 % (ref 38–73)
NONHDLC SERPL-MCNC: 120 MG/DL
NRBC BLD-RTO: 0 /100 WBC
PLATELET # BLD AUTO: 187 K/UL (ref 150–450)
PMV BLD AUTO: 8.7 FL (ref 9.2–12.9)
POTASSIUM SERPL-SCNC: 4.1 MMOL/L (ref 3.5–5.1)
PROT SERPL-MCNC: 6.7 G/DL (ref 6–8.4)
RBC # BLD AUTO: 4.78 M/UL (ref 4–5.4)
SODIUM SERPL-SCNC: 141 MMOL/L (ref 136–145)
TRIGL SERPL-MCNC: 83 MG/DL (ref 30–150)
TSH SERPL DL<=0.005 MIU/L-ACNC: 3.25 UIU/ML (ref 0.4–4)
WBC # BLD AUTO: 7.38 K/UL (ref 3.9–12.7)

## 2025-01-24 PROCEDURE — 83036 HEMOGLOBIN GLYCOSYLATED A1C: CPT | Performed by: INTERNAL MEDICINE

## 2025-01-24 PROCEDURE — 80061 LIPID PANEL: CPT | Performed by: INTERNAL MEDICINE

## 2025-01-24 PROCEDURE — 80053 COMPREHEN METABOLIC PANEL: CPT | Performed by: INTERNAL MEDICINE

## 2025-01-24 PROCEDURE — 84443 ASSAY THYROID STIM HORMONE: CPT | Performed by: INTERNAL MEDICINE

## 2025-01-24 PROCEDURE — 85025 COMPLETE CBC W/AUTO DIFF WBC: CPT | Performed by: INTERNAL MEDICINE

## 2025-01-24 PROCEDURE — 36415 COLL VENOUS BLD VENIPUNCTURE: CPT | Performed by: INTERNAL MEDICINE

## 2025-01-27 ENCOUNTER — TELEPHONE (OUTPATIENT)
Dept: INTERNAL MEDICINE | Facility: CLINIC | Age: 70
End: 2025-01-27
Payer: MEDICARE

## 2025-01-27 DIAGNOSIS — R06.83 SNORING: Primary | ICD-10-CM

## 2025-01-27 NOTE — TELEPHONE ENCOUNTER
----- Message from Esa sent at 1/27/2025 10:53 AM CST -----  Regarding: [facility]    Type: Patient Call Back    Who called:KARMAGOBA    What is the request in detail:[facility] HAS BEEN TRYING SINCE 01/16. CALLING TO VERIFY CPAP SUPPLIES. PLEASE FAX OVER [phone]    Can the clinic reply by [facility]? No    Would the patient rather a call back or a response via [facility]? Call back    Best call back number:[phone]    Additional Information:    Thank you.

## 2025-01-27 NOTE — TELEPHONE ENCOUNTER
Who called:[employer]    Called asking for pt CPAP supplies. I see an encounter from me was faxed over on 01/16/25 @4:34 pm to [facility].     I will fax forms over to [employer] fax they left for us to fax to: [phone].       Faxed to [employer] on 01/27/25 @ 3:22 pm. To fax number provider by them: [phone]

## 2025-02-04 ENCOUNTER — TELEPHONE (OUTPATIENT)
Dept: INTERNAL MEDICINE | Facility: CLINIC | Age: 70
End: 2025-02-04
Payer: MEDICARE

## 2025-02-04 NOTE — TELEPHONE ENCOUNTER
LVM for [facility]. I faxed over pt cpap orders with progress notes but from 2023. Pt has not been seen since then. I assuming they need recent progress notes. Pt has an appt on 03/24/25.

## 2025-02-04 NOTE — TELEPHONE ENCOUNTER
----- Message from Gina sent at 2/3/2025  1:37 PM CST -----  Type : Patient Call    Who Called : [employer] (Jenniffer)    Does the patient know what this is regarding?: Requesting a callback regarding fax sent over on 1/29 regarding needing pt's recent chart notes. Jenniffer also stated the order that was faxed over for pt's c-pap supplies didn't include the pressure settings for machine.      Would the patient rather a call back or a response via [facility]? Call    Best Call Back Number: [phone]    Additional Information:  Fax Number - [phone]

## 2025-02-06 ENCOUNTER — TELEPHONE (OUTPATIENT)
Dept: INTERNAL MEDICINE | Facility: CLINIC | Age: 70
End: 2025-02-06
Payer: MEDICARE

## 2025-02-06 NOTE — TELEPHONE ENCOUNTER
----- Message from Carolann sent at 2/6/2025 10:52 AM CST -----  Who called:alexandre([employer]     What is the request in detail:alexandre is calling in regards of they need current office vist notes and prescriptions with preesure settings     Can the clinic reply by [facility]?     Would the patient rather a call back or a response via [facility]?callback      Best call back number:[phone]     Additional Information: fax number [phone]

## 2025-02-10 ENCOUNTER — TELEPHONE (OUTPATIENT)
Dept: INTERNAL MEDICINE | Facility: CLINIC | Age: 70
End: 2025-02-10

## 2025-02-10 NOTE — TELEPHONE ENCOUNTER
----- Message from Anatoly sent at 2/10/2025  3:48 PM CST -----  Type: Patient Call          Who Called:[facility]       Does the patient know what this is regarding? Requesting a call back to discuss the paperwork for AD[employer] that was told was sent on 1/16, 1/17, 1/31 had not been received back to them. Please advise           Does the patient rather a call back or a response via [facility]? call        Best Call Back Number: [phone] ( Corrie),   fax  [phone]        Additional Information:

## 2025-02-15 DIAGNOSIS — F32.A DEPRESSION, UNSPECIFIED DEPRESSION TYPE: ICD-10-CM

## 2025-02-15 NOTE — TELEPHONE ENCOUNTER
Care Due:                  Date            Visit Type   Department     Provider  --------------------------------------------------------------------------------                                EP -                              PRIMARY      [facility] INTERNAL  Last Visit: 11-      CARE ([facility])   MEDICINE       Jean Carlos Samuels  Next Visit: None Scheduled  None         None Found                                                            Last  Test          Frequency    Reason                     Performed    Due Date  --------------------------------------------------------------------------------    Office Visit  15 months..  atorvastatin, citalopram.  11- 01-    [facility] Embedded Care Due Messages. Reference number: [id number].   2/15/2025 8:04:27 AM CST

## 2025-02-17 ENCOUNTER — TELEPHONE (OUTPATIENT)
Dept: INTERNAL MEDICINE | Facility: CLINIC | Age: 70
End: 2025-02-17
Payer: MEDICARE

## 2025-02-17 RX ORDER — DIAZEPAM 2 MG/1
2 TABLET ORAL EVERY 8 HOURS PRN
Qty: 60 TABLET | Refills: 0 | Status: SHIPPED | OUTPATIENT
Start: 2025-02-17

## 2025-02-17 NOTE — TELEPHONE ENCOUNTER
Refill Encounter    PCP Visits:   11/6/23. Pt is schedule for 3/24/25  Last 3 Blood Pressure:   BP Readings from Last 3 Encounters:   12/24/24 119/66   12/19/24 (!) 147/65   11/20/24 116/61     Preferred Pharmacy:   [facility]) - [place] [id number] [facility]  [address] [facility]  [address]  [facility] [place] [facility] [address]  Phone: [phone] Fax: [phone]    [facility] - [place], [place] - [address]  [address]  [place] [address]  Phone: [phone] Fax: [phone]    [facility] - [place] [id number]  [address] [address]  Phone: [phone] Fax: [phone]    Requested RX:  Requested Prescriptions     Pending Prescriptions Disp Refills    diazePAM (VALIUM) 2 MG tablet [Pharmacy Med Name: diazepam 2 mg tablet] 60 tablet 2     Sig: TAKE ONE TABLET BY MOUTH EVERY 8 HOURS AS NEEDED FOR ANXIETY      RX Route: Normal

## 2025-02-17 NOTE — TELEPHONE ENCOUNTER
----- Message from Esa sent at 2/17/2025  4:13 PM CST -----  Regarding: [employer]  Type: Patient Call BackWho called:[facility] companyWhat is the request in detail:the company has sent over cpap supplies form and yes, it was sent(as per Dr Witt office)  but they still have not received it. Please fill out and refax. Dr Witt office has received a current fax of this form again on 02/17. Can the clinic reply by [facility]? NoWould the patient rather a call back or a response via [facility]? Call [facility] call back number:[phone] (patient)Additional Information:[phone]-Fax # [phone] you.

## 2025-02-26 ENCOUNTER — OFFICE VISIT (OUTPATIENT)
Dept: GASTROENTEROLOGY | Facility: CLINIC | Age: 70
End: 2025-02-26
Payer: MEDICARE

## 2025-02-26 VITALS — HEIGHT: 62 IN | WEIGHT: 192.25 LBS | BODY MASS INDEX: 35.38 KG/M2

## 2025-02-26 DIAGNOSIS — K52.839 MICROSCOPIC COLITIS, UNSPECIFIED MICROSCOPIC COLITIS TYPE: Primary | ICD-10-CM

## 2025-02-26 PROCEDURE — 3288F FALL RISK ASSESSMENT DOCD: CPT | Mod: CPTII,S$GLB,, | Performed by: INTERNAL MEDICINE

## 2025-02-26 PROCEDURE — 1126F AMNT PAIN NOTED NONE PRSNT: CPT | Mod: CPTII,S$GLB,, | Performed by: INTERNAL MEDICINE

## 2025-02-26 PROCEDURE — 3044F HG A1C LEVEL LT 7.0%: CPT | Mod: CPTII,S$GLB,, | Performed by: INTERNAL MEDICINE

## 2025-02-26 PROCEDURE — 1159F MED LIST DOCD IN RCRD: CPT | Mod: CPTII,S$GLB,, | Performed by: INTERNAL MEDICINE

## 2025-02-26 PROCEDURE — 99999 PR PBB SHADOW E&M-EST. PATIENT-LVL III: CPT | Mod: PBBFAC,,, | Performed by: INTERNAL MEDICINE

## 2025-02-26 PROCEDURE — 99214 OFFICE O/P EST MOD 30 MIN: CPT | Mod: S$GLB,,, | Performed by: INTERNAL MEDICINE

## 2025-02-26 PROCEDURE — 1101F PT FALLS ASSESS-DOCD LE1/YR: CPT | Mod: CPTII,S$GLB,, | Performed by: INTERNAL MEDICINE

## 2025-02-26 PROCEDURE — 3008F BODY MASS INDEX DOCD: CPT | Mod: CPTII,S$GLB,, | Performed by: INTERNAL MEDICINE

## 2025-02-26 RX ORDER — BUDESONIDE 3 MG/1
9 CAPSULE, COATED PELLETS ORAL DAILY
COMMUNITY
End: 2025-02-26 | Stop reason: SDUPTHER

## 2025-02-26 RX ORDER — BUDESONIDE 3 MG/1
9 CAPSULE, COATED PELLETS ORAL DAILY
Qty: 270 CAPSULE | Refills: 3 | Status: SHIPPED | OUTPATIENT
Start: 2025-02-26 | End: 2025-02-26 | Stop reason: SDUPTHER

## 2025-02-26 RX ORDER — BUDESONIDE 3 MG/1
9 CAPSULE, COATED PELLETS ORAL DAILY
Qty: 90 CAPSULE | Refills: 0 | Status: SHIPPED | OUTPATIENT
Start: 2025-02-26 | End: 2025-03-28

## 2025-02-26 NOTE — PROGRESS NOTES
"Subjective     This is an established patient.      Patient ID: Lucas Mayer is a 69 y.o. female.    Chief Complaint: Colitis      Patient seen for follow up of colitis, microscopic, diagnosed on biopsy, associated with greater than 10 liquid nonbloody BMs per day, remote onset.  She has now been on budesonide 9 mg daily and is having 4 formed BMs per day with no nocturnal symptoms.  She needs medication refill.  No other complaints.      Review of Systems   Constitutional:  Negative for chills, fatigue and fever.   HENT:  Negative for sore throat and trouble swallowing.    Respiratory:  Negative for cough, shortness of breath and wheezing.    Cardiovascular:  Negative for chest pain and palpitations.   Gastrointestinal:  Negative for abdominal pain, blood in stool, nausea and vomiting.   Genitourinary:  Negative for dysuria and hematuria.   Musculoskeletal:  Negative for arthralgias and myalgias.   Integumentary:  Negative for color change and rash.   Neurological:  Negative for dizziness and headaches.   Hematological:  Negative for adenopathy.   Psychiatric/Behavioral:  Negative for confusion. The patient is not nervous/anxious.    All other systems reviewed and are negative.         Objective     Vitals:    02/26/25 1542   Weight: 87.2 kg (192 lb 3.9 oz)   Height: 5' 2" (1.575 m)         Physical Exam  Constitutional:       Appearance: She is well-developed.   HENT:      Head: Normocephalic and atraumatic.   Eyes:      General: No scleral icterus.     Pupils: Pupils are equal, round, and reactive to light.   Cardiovascular:      Rate and Rhythm: Normal rate and regular rhythm.      Heart sounds: No murmur heard.  Pulmonary:      Effort: Pulmonary effort is normal.      Breath sounds: Normal breath sounds. No wheezing.   Abdominal:      General: Bowel sounds are normal. There is no distension.      Palpations: Abdomen is soft.      Tenderness: There is no abdominal tenderness.   Musculoskeletal:         " General: No tenderness.      Cervical back: Normal range of motion.   Lymphadenopathy:      Cervical: No cervical adenopathy.   Skin:     General: Skin is warm and dry.      Findings: No rash.   Neurological:      Mental Status: She is alert.       Lab Results   Component Value Date    WBC 7.38 01/24/2025    HGB 13.7 01/24/2025    HCT 42.0 01/24/2025    MCV 88 01/24/2025     01/24/2025         CMP  Sodium   Date Value Ref Range Status   01/24/2025 141 136 - 145 mmol/L Final     Potassium   Date Value Ref Range Status   01/24/2025 4.1 3.5 - 5.1 mmol/L Final     Chloride   Date Value Ref Range Status   01/24/2025 107 95 - 110 mmol/L Final     CO2   Date Value Ref Range Status   01/24/2025 25 23 - 29 mmol/L Final     Glucose   Date Value Ref Range Status   01/24/2025 94 70 - 110 mg/dL Final     BUN   Date Value Ref Range Status   01/24/2025 15 8 - 23 mg/dL Final     Creatinine   Date Value Ref Range Status   01/24/2025 0.7 0.5 - 1.4 mg/dL Final     Calcium   Date Value Ref Range Status   01/24/2025 9.7 8.7 - 10.5 mg/dL Final     Total Protein   Date Value Ref Range Status   01/24/2025 6.7 6.0 - 8.4 g/dL Final     Albumin   Date Value Ref Range Status   01/24/2025 3.5 3.5 - 5.2 g/dL Final     Total Bilirubin   Date Value Ref Range Status   01/24/2025 0.9 0.1 - 1.0 mg/dL Final     Comment:     For infants and newborns, interpretation of results should be based  on gestational age, weight and in agreement with clinical  observations.    Premature Infant recommended reference ranges:  Up to 24 hours.............<8.0 mg/dL  Up to 48 hours............<12.0 mg/dL  3-5 days..................<15.0 mg/dL  6-29 days.................<15.0 mg/dL       Alkaline Phosphatase   Date Value Ref Range Status   01/24/2025 97 40 - 150 U/L Final     AST   Date Value Ref Range Status   01/24/2025 13 10 - 40 U/L Final     ALT   Date Value Ref Range Status   01/24/2025 16 10 - 44 U/L Final     Anion Gap   Date Value Ref Range Status    01/24/2025 9 8 - 16 mmol/L Final     eGFR   Date Value Ref Range Status   01/24/2025 >60.0 >60 mL/min/1.73 m^2 Final            Assessment and Plan     1. Microscopic colitis, unspecified microscopic colitis type  -     Discontinue: budesonide (ENTOCORT EC) 3 mg capsule; Take 3 capsules (9 mg total) by mouth once daily.  Dispense: 270 capsule; Refill: 3  -     budesonide (ENTOCORT EC) 3 mg capsule; Take 3 capsules (9 mg total) by mouth once daily.  Dispense: 90 capsule; Refill: 0        Continue budesonide and begin tapering.  Follow up in office in 6-8 weeks to reassess.         No follow-ups on file.

## 2025-02-28 ENCOUNTER — PATIENT MESSAGE (OUTPATIENT)
Dept: GASTROENTEROLOGY | Facility: CLINIC | Age: 70
End: 2025-02-28
Payer: MEDICARE

## 2025-02-28 DIAGNOSIS — K52.839 MICROSCOPIC COLITIS, UNSPECIFIED MICROSCOPIC COLITIS TYPE: ICD-10-CM

## 2025-03-05 RX ORDER — BUDESONIDE 3 MG/1
9 CAPSULE, COATED PELLETS ORAL DAILY
Qty: 90 CAPSULE | Refills: 0 | Status: SHIPPED | OUTPATIENT
Start: 2025-03-05 | End: 2025-04-04

## 2025-03-13 ENCOUNTER — TELEPHONE (OUTPATIENT)
Dept: INTERNAL MEDICINE | Facility: CLINIC | Age: 70
End: 2025-03-13

## 2025-03-13 NOTE — TELEPHONE ENCOUNTER
Patient is scheduled for 3/24, unable to update chart notes due to patient now being seen in over a year. Will call DME after 8am.

## 2025-03-13 NOTE — TELEPHONE ENCOUNTER
----- Message from Anatoly sent at 3/12/2025  2:12 PM CDT -----  Type: Patient CallWho Called: [facility] patient know what this is regarding? Requesting a call back to discuss if the detailed order for CPAP supplies had been received. It was faxed over on 2/20 and 2/26 and a few other times. Please advise Does the patient rather a call back or a response via [facility]? callBest Call Back Number: [phone], fax [phone] Information:

## 2025-03-21 ENCOUNTER — TELEPHONE (OUTPATIENT)
Dept: INTERNAL MEDICINE | Facility: CLINIC | Age: 70
End: 2025-03-21
Payer: MEDICARE

## 2025-03-21 NOTE — TELEPHONE ENCOUNTER
----- Message from Med Assistant Alva sent at 3/21/2025  9:26 AM CDT -----  Who called:Russ with [facility] [employer] What is the request in detail:On 2/26 they faxed a request for a cpap order , needs to know if received and status completion Can the clinic reply by [facility]? NoNo Would the patient rather a call back or a response via [facility]? Call backCallback Best call back number:[phone] Information:Fax [phone] you.

## 2025-03-26 DIAGNOSIS — I10 BENIGN ESSENTIAL HYPERTENSION: ICD-10-CM

## 2025-03-27 ENCOUNTER — PATIENT MESSAGE (OUTPATIENT)
Dept: GASTROENTEROLOGY | Facility: CLINIC | Age: 70
End: 2025-03-27
Payer: MEDICARE

## 2025-03-27 NOTE — TELEPHONE ENCOUNTER
No care due was identified.  [facility] Embedded Care Due Messages. Reference number: [id number].   3/26/2025 9:36:44 PM CDT

## 2025-03-27 NOTE — TELEPHONE ENCOUNTER
Refill Encounter    PCP Visits: Recent Visits  No visits were found meeting these conditions.  Showing recent visits within past 360 days and meeting all other requirements  Future Appointments  Date Type Provider Dept   03/28/25 Appointment Jean Carlos Samuels MD [facility]   Showing future appointments within next 720 days and meeting all other requirements      Last 3 Blood Pressure:   BP Readings from Last 3 Encounters:   12/24/24 119/66   12/19/24 (!) 147/65   11/20/24 116/61     Preferred Pharmacy:   [facility] - [address] [address]  Phone: [phone] Fax: [phone]    Requested RX:  Requested Prescriptions     Pending Prescriptions Disp Refills    amLODIPine (NORVASC) 5 MG tablet [Pharmacy Med Name: amLODIPine Besylate 5 MG Oral Tablet] 90 tablet 3     Sig: TAKE 1 TABLET BY MOUTH ONCE  DAILY      RX Route: Normal

## 2025-03-27 NOTE — TELEPHONE ENCOUNTER
----- Message from Med Assistant Alva sent at 3/27/2025 12:08 PM CDT -----  Who called:[employer] What is the request in detail:Requesting a callback to discuss pt Can the clinic reply by [facility]? NoWould the patient rather a call back or a response via [facility]? Call backCallback Best call back number:[phone] Information:Thank you.

## 2025-03-27 NOTE — TELEPHONE ENCOUNTER
Called [employer] back and they are requesting Cpap prescription setting, continue use and benefits to be documented in progress notes. Fax: [phone].       SEND HIGH PRIORITY MSG TO ME ON 03/28/25. Day of pt appt.

## 2025-03-28 ENCOUNTER — OFFICE VISIT (OUTPATIENT)
Dept: INTERNAL MEDICINE | Facility: CLINIC | Age: 70
End: 2025-03-28
Attending: INTERNAL MEDICINE
Payer: MEDICARE

## 2025-03-28 VITALS
DIASTOLIC BLOOD PRESSURE: 70 MMHG | HEART RATE: 64 BPM | HEIGHT: 62 IN | WEIGHT: 192.69 LBS | OXYGEN SATURATION: 99 % | SYSTOLIC BLOOD PRESSURE: 122 MMHG | BODY MASS INDEX: 35.46 KG/M2

## 2025-03-28 DIAGNOSIS — E78.5 HYPERLIPIDEMIA, UNSPECIFIED HYPERLIPIDEMIA TYPE: ICD-10-CM

## 2025-03-28 DIAGNOSIS — Z12.39 ENCOUNTER FOR SCREENING FOR MALIGNANT NEOPLASM OF BREAST, UNSPECIFIED SCREENING MODALITY: ICD-10-CM

## 2025-03-28 DIAGNOSIS — R79.9 ABNORMAL FINDING OF BLOOD CHEMISTRY, UNSPECIFIED: ICD-10-CM

## 2025-03-28 DIAGNOSIS — R20.8 OTHER DISTURBANCES OF SKIN SENSATION: ICD-10-CM

## 2025-03-28 DIAGNOSIS — I10 BENIGN ESSENTIAL HYPERTENSION: Primary | ICD-10-CM

## 2025-03-28 DIAGNOSIS — Z12.31 ENCOUNTER FOR SCREENING MAMMOGRAM FOR MALIGNANT NEOPLASM OF BREAST: ICD-10-CM

## 2025-03-28 DIAGNOSIS — Z00.00 ANNUAL PHYSICAL EXAM: ICD-10-CM

## 2025-03-28 DIAGNOSIS — K76.0 FATTY LIVER: ICD-10-CM

## 2025-03-28 DIAGNOSIS — G47.33 OSA (OBSTRUCTIVE SLEEP APNEA): ICD-10-CM

## 2025-03-28 DIAGNOSIS — G25.81 RLS (RESTLESS LEGS SYNDROME): ICD-10-CM

## 2025-03-28 DIAGNOSIS — K52.839 MICROSCOPIC COLITIS, UNSPECIFIED MICROSCOPIC COLITIS TYPE: ICD-10-CM

## 2025-03-28 DIAGNOSIS — L60.8 CHANGE IN NAIL APPEARANCE: ICD-10-CM

## 2025-03-28 PROCEDURE — 99999 PR PBB SHADOW E&M-EST. PATIENT-LVL III: CPT | Mod: PBBFAC,,, | Performed by: INTERNAL MEDICINE

## 2025-03-28 RX ORDER — AMLODIPINE BESYLATE 5 MG/1
5 TABLET ORAL
Qty: 90 TABLET | Refills: 3 | Status: SHIPPED | OUTPATIENT
Start: 2025-03-28

## 2025-03-28 RX ORDER — ATORVASTATIN CALCIUM 40 MG/1
40 TABLET, FILM COATED ORAL DAILY
Qty: 90 TABLET | Refills: 3 | Status: SHIPPED | OUTPATIENT
Start: 2025-03-28

## 2025-03-28 RX ORDER — GABAPENTIN 300 MG/1
300 CAPSULE ORAL NIGHTLY
Qty: 30 CAPSULE | Refills: 0 | Status: SHIPPED | OUTPATIENT
Start: 2025-03-28 | End: 2026-03-28

## 2025-03-28 NOTE — PROGRESS NOTES
"Subjective:       Patient ID: Lucas Mayer is a 69 y.o. female.    Chief Complaint: Annual Exam    Here for annual exam  [facility] 2023        66 yo F with PMHx of HTN, chronic vertigo, Severe REM related MORALES, NAFLD, severe snoring (rec ENT eval) and PLMS without arousals    Performing aquatics twice a week and chair yoga.     Knee better.    Stool much improved since Dx microscopic colitis and stared on budesonide    Bilateral bleph by Toya Blair MD on 12/21/2023     [facility] Uro[facility]yn Isabel Hagan,  on  11/20/2024 writes "stop vesicare 5 mg daily, start [id number] mg daily"      ### Microscopic colitis ###  colitis, microscopic, diagnosed on biopsy, associated with greater than 10 liquid nonbloody BMs per day, remote onset.  She has now been on budesonide 9 mg daily and is having 4 formed BMs per day with no nocturnal symptoms.  [facility] GI Hernandez Hwang MD on 2/26/2025 writes "Continue budesonide and Follow up in office in 6-8 weeks to reassess."      Some bilateral leg swelling. Denies CP at rest or with exertion, dizziness with exertion, shortness of breath out of proportion to level of activity, frequent or sustained palpitations, orthopnea, PND.      ### HTN ###  Amlodipine 5mg  Denies frequent or current HA, intermittent blurry vision, dizziness, CP, or SOB.  [facility]      ### MORALES ###  Moderate MORALES, Severe REM related MORALES, severe snoring (rec ENT eval) and PLMS without arousals  CPAP titration 12/16/19  Tolerating CPAP from [facility]      ### NAFLD ###  FIB-4 Calculation: 1.13 at 11/6/2023 10:33 AM  Last RUQ U/S: 6/30/23  FIB-4 Calculation: 1.23 at 1/24/2025  8:58 AM          ### Vertigo ###  She was placed on low dose valium 2mg BID with an 80% reduction in her symptoms. Symptoms remain well controlled. She will still get dizzy when lying flat or driving over bridges. She must walk on edge of hallways to minimize trigger     ### " "Bilateral knee pain ###  -frequent cracking and occasional pain. Not debilitating. Overall stable. 75lb dog hit her right knee and it has been improved since. Now her left leg is more bothersome.     5/25/22 worsened.                  History of Present Illness              Review of Systems   Constitutional:  Negative for chills, fatigue, fever and unexpected weight change.   HENT:  Negative for ear pain, hearing loss, postnasal drip, tinnitus, trouble swallowing and voice change.    Respiratory:  Negative for cough, chest tightness, shortness of breath and wheezing.    Cardiovascular:  Negative for chest pain, palpitations and leg swelling.   Gastrointestinal:  Negative for abdominal pain, blood in stool, diarrhea, nausea and vomiting.   Endocrine: Negative for polydipsia, polyphagia and polyuria.   Genitourinary:  Negative for difficulty urinating, dysuria, hematuria and vaginal bleeding.   Skin:  Negative for rash.   Allergic/Immunologic: Negative for food allergies.   Neurological:  Positive for dizziness and tremors. Negative for numbness and headaches.   Hematological:  Does not bruise/bleed easily.   Psychiatric/Behavioral:  The patient is not nervous/anxious.        Objective:      Vitals:    03/28/25 1034   BP: 122/70   BP Location: Left arm   Patient Position: Sitting   Pulse: 64   SpO2: 99%   Weight: 87.4 kg (192 lb 10.9 oz)   Height: 5' 2" (1.575 m)      Physical Exam  Vitals and nursing note reviewed.   Constitutional:       General: She is not in acute distress.     Appearance: Normal appearance. She is well-developed.   HENT:      Head: Normocephalic and atraumatic.      Mouth/Throat:      Pharynx: No oropharyngeal exudate.   Eyes:      General: No scleral icterus.     Conjunctiva/sclera: Conjunctivae normal.      Pupils: Pupils are equal, round, and reactive to light.   Neck:      Thyroid: No thyromegaly.   Cardiovascular:      Rate and Rhythm: Normal rate and regular rhythm.      Heart sounds: " Normal heart sounds. No murmur heard.  Pulmonary:      Effort: Pulmonary effort is normal.      Breath sounds: Normal breath sounds. No wheezing or rales.   Abdominal:      General: There is no distension.   Musculoskeletal:         General: No tenderness.   Lymphadenopathy:      Cervical: No cervical adenopathy.   Skin:     General: Skin is warm and dry.   Neurological:      Mental Status: She is alert and oriented to person, place, and time.   Psychiatric:         Behavior: Behavior normal.         Assessment:       1. Benign essential hypertension    2. Hyperlipidemia, unspecified hyperlipidemia type    3. Annual physical exam    4. MORALES (obstructive sleep apnea)    5. Fatty liver    6. Microscopic colitis, unspecified microscopic colitis type    7. Encounter for screening for malignant neoplasm of breast, unspecified screening modality    8. Encounter for screening mammogram for malignant neoplasm of breast    9. Change in nail appearance    10. Other disturbances of skin sensation    11. Abnormal finding of blood chemistry, unspecified    12. RLS (restless legs syndrome)        Plan:       Lucas was seen today for annual exam.    Diagnoses and all orders for this visit:  Annual physical exam  Annual labs done prior to appointment. We reviewed labs together. We discussed routine age appropriate cancer screening guidelines along with vaccination schedules and their risk and benefits. A complete ROS was performed and patient had an opportunity to ask questions and be provided answers to the best of my knowledge.     Benign essential hypertension   Controlled and asymptomatic.  Continue current Rx regimen.     Hyperlipidemia, unspecified hyperlipidemia type  -     atorvastatin (LIPITOR) 40 MG tablet; Take 1 tablet (40 mg total) by mouth once daily.    MORALES (obstructive sleep apnea)   Stressed adherence of and complications related to untreated MORALES.    Fatty liver    Microscopic colitis, unspecified microscopic  colitis type   Controlled and asymptomatic.  Continue current Rx regimen per GI.     Encounter for screening for malignant neoplasm of breast, unspecified screening modality  -     Mammo Digital Screening Bilat; Future    Encounter for screening mammogram for malignant neoplasm of breast  -     Mammo Digital Screening Bilat; Future    Change in nail appearance   Rec f/u derm  -     Vitamin B7 (Biotin); Future  -     Vitamin B12; Future  -     Ferritin; Future  -     Zinc; Future    Other disturbances of skin sensation  -     Vitamin B12; Future    Abnormal finding of blood chemistry, unspecified  -     Ferritin; Future    RLS (restless legs syndrome)  -     gabapentin (NEURONTIN) 300 MG capsule           Assessment & Plan            Visit today is associated with current or anticipated ongoing medical care related to this patient's single serious condition/complex condition of HTN, MORALES, MALSD. The patient will return to see me as these issues will be followed longitudinally.    This note was generated with the assistance of ambient listening technology. Verbal consent was obtained by the patient and accompanying visitor(s) for the recording of patient appointment to facilitate this note. I attest to having reviewed and edited the generated note for accuracy, though some syntax or spelling errors may persist. Please contact the author of this note for any clarification.      Jean Carlos Strickland MD  Internal Medicine-[facility]        Side effects of medication(s) were discussed in detail and patient voiced understanding.  Patient will call back for any issues or complications.

## 2025-03-29 ENCOUNTER — LAB VISIT (OUTPATIENT)
Dept: LAB | Facility: HOSPITAL | Age: 70
End: 2025-03-29
Attending: INTERNAL MEDICINE
Payer: MEDICARE

## 2025-03-29 DIAGNOSIS — R20.8 OTHER DISTURBANCES OF SKIN SENSATION: ICD-10-CM

## 2025-03-29 DIAGNOSIS — L60.8 CHANGE IN NAIL APPEARANCE: ICD-10-CM

## 2025-03-29 DIAGNOSIS — R79.9 ABNORMAL FINDING OF BLOOD CHEMISTRY, UNSPECIFIED: ICD-10-CM

## 2025-03-29 LAB
FERRITIN SERPL-MCNC: 69 NG/ML (ref 20–300)
VIT B12 SERPL-MCNC: 535 PG/ML (ref 210–950)

## 2025-03-29 PROCEDURE — 82728 ASSAY OF FERRITIN: CPT

## 2025-03-29 PROCEDURE — 36415 COLL VENOUS BLD VENIPUNCTURE: CPT

## 2025-03-29 PROCEDURE — 82607 VITAMIN B-12: CPT

## 2025-03-29 PROCEDURE — 84630 ASSAY OF ZINC: CPT

## 2025-03-29 PROCEDURE — 84591 ASSAY OF NOS VITAMIN: CPT

## 2025-04-09 ENCOUNTER — RESULTS FOLLOW-UP (OUTPATIENT)
Dept: INTERNAL MEDICINE | Facility: CLINIC | Age: 70
End: 2025-04-09

## 2025-04-17 ENCOUNTER — HOSPITAL ENCOUNTER (OUTPATIENT)
Dept: RADIOLOGY | Facility: HOSPITAL | Age: 70
Discharge: HOME | End: 2025-04-17
Attending: INTERNAL MEDICINE
Payer: MEDICARE

## 2025-04-17 DIAGNOSIS — Z12.31 ENCOUNTER FOR SCREENING MAMMOGRAM FOR BREAST CANCER: ICD-10-CM

## 2025-04-17 DIAGNOSIS — F32.A DEPRESSION, UNSPECIFIED DEPRESSION TYPE: ICD-10-CM

## 2025-04-17 PROCEDURE — 77063 BREAST TOMOSYNTHESIS BI: CPT | Mod: TC

## 2025-04-18 NOTE — TELEPHONE ENCOUNTER
No care due was identified.  [facility] Embedded Care Due Messages. Reference number: [id number].   4/17/2025 9:52:28 PM CDT

## 2025-04-18 NOTE — TELEPHONE ENCOUNTER
Refill Routing Note   Medication(s) are not appropriate for processing by [facility] for the following reason(s):        Drug-disease interaction    [facility] action(s):  Defer        Medication Therapy Plan: citalopram and Fatty liver      Appointments  past 12m or future 3m with PCP    Date Provider   Last Visit   3/28/2025 Jean Carlos Samuels MD   Next Visit   Visit date not found Jean Carlos Samuels MD   ED visits in past 90 days: 0        Note composed:4:05 AM 04/18/2025

## 2025-04-21 DIAGNOSIS — G25.81 RLS (RESTLESS LEGS SYNDROME): ICD-10-CM

## 2025-04-21 DIAGNOSIS — F32.A DEPRESSION, UNSPECIFIED DEPRESSION TYPE: ICD-10-CM

## 2025-04-21 RX ORDER — CITALOPRAM 20 MG/1
20 TABLET, FILM COATED ORAL DAILY
Qty: 90 TABLET | Refills: 2 | OUTPATIENT
Start: 2025-04-21

## 2025-04-21 RX ORDER — CITALOPRAM 20 MG/1
20 TABLET, FILM COATED ORAL
Qty: 90 TABLET | Refills: 3 | Status: SHIPPED | OUTPATIENT
Start: 2025-04-21

## 2025-04-21 NOTE — TELEPHONE ENCOUNTER
No care due was identified.  [facility] Embedded Care Due Messages. Reference number: [id number].   4/21/2025 9:08:31 AM CDT

## 2025-04-22 ENCOUNTER — OFFICE VISIT (OUTPATIENT)
Dept: GASTROENTEROLOGY | Facility: CLINIC | Age: 70
End: 2025-04-22
Payer: MEDICARE

## 2025-04-22 VITALS — WEIGHT: 196.44 LBS | BODY MASS INDEX: 36.15 KG/M2 | HEIGHT: 62 IN

## 2025-04-22 DIAGNOSIS — K52.839 MICROSCOPIC COLITIS, UNSPECIFIED MICROSCOPIC COLITIS TYPE: ICD-10-CM

## 2025-04-22 DIAGNOSIS — K52.9 CHRONIC DIARRHEA: Primary | ICD-10-CM

## 2025-04-22 PROCEDURE — 3008F BODY MASS INDEX DOCD: CPT | Mod: CPTII,S$GLB,, | Performed by: INTERNAL MEDICINE

## 2025-04-22 PROCEDURE — 3288F FALL RISK ASSESSMENT DOCD: CPT | Mod: CPTII,S$GLB,, | Performed by: INTERNAL MEDICINE

## 2025-04-22 PROCEDURE — 3044F HG A1C LEVEL LT 7.0%: CPT | Mod: CPTII,S$GLB,, | Performed by: INTERNAL MEDICINE

## 2025-04-22 PROCEDURE — 1101F PT FALLS ASSESS-DOCD LE1/YR: CPT | Mod: CPTII,S$GLB,, | Performed by: INTERNAL MEDICINE

## 2025-04-22 PROCEDURE — 1126F AMNT PAIN NOTED NONE PRSNT: CPT | Mod: CPTII,S$GLB,, | Performed by: INTERNAL MEDICINE

## 2025-04-22 PROCEDURE — 1159F MED LIST DOCD IN RCRD: CPT | Mod: CPTII,S$GLB,, | Performed by: INTERNAL MEDICINE

## 2025-04-22 PROCEDURE — 99999 PR PBB SHADOW E&M-EST. PATIENT-LVL III: CPT | Mod: PBBFAC,,, | Performed by: INTERNAL MEDICINE

## 2025-04-22 PROCEDURE — 99214 OFFICE O/P EST MOD 30 MIN: CPT | Mod: S$GLB,,, | Performed by: INTERNAL MEDICINE

## 2025-04-22 RX ORDER — BUDESONIDE 3 MG/1
9 CAPSULE, COATED PELLETS ORAL DAILY
Qty: 270 CAPSULE | Refills: 3 | Status: SHIPPED | OUTPATIENT
Start: 2025-04-22 | End: 2026-04-22

## 2025-04-22 RX ORDER — DIAZEPAM 2 MG/1
2 TABLET ORAL EVERY 8 HOURS PRN
Qty: 60 TABLET | Refills: 0 | Status: SHIPPED | OUTPATIENT
Start: 2025-04-22

## 2025-04-22 RX ORDER — BUDESONIDE 3 MG/1
9 CAPSULE, COATED PELLETS ORAL DAILY
COMMUNITY

## 2025-04-22 RX ORDER — GABAPENTIN 300 MG/1
300 CAPSULE ORAL NIGHTLY
Qty: 30 CAPSULE | Refills: 0 | Status: SHIPPED | OUTPATIENT
Start: 2025-04-22 | End: 2026-04-22

## 2025-04-22 NOTE — TELEPHONE ENCOUNTER
Refill Routing Note   Medication(s) are not appropriate for processing by [facility] for the following reason(s):        Outside of protocol    [facility] action(s):  Quick Discontinue  Route      Medication Therapy Plan: Citalopram:Receipt confirmed by pharmacy (4/21/2025  3:59 PM CDT)      Appointments  past 12m or future 3m with PCP    Date Provider   Last Visit   3/28/2025 Jean Carlos Samuels MD   Next Visit   Visit date not found Jean Carlos Samuels MD   ED visits in past 90 days: 0        Note composed:10:22 PM 04/21/2025

## 2025-04-23 RX ORDER — DIPHENOXYLATE HYDROCHLORIDE AND ATROPINE SULFATE 2.5; .025 MG/1; MG/1
1 TABLET ORAL 4 TIMES DAILY PRN
Qty: 30 TABLET | Refills: 1 | Status: SHIPPED | OUTPATIENT
Start: 2025-04-23 | End: 2025-07-22

## 2025-04-23 NOTE — PROGRESS NOTES
"Subjective     This is an established patient.      Patient ID: Lucas Mayer is a 69 y.o. female.    Chief Complaint: Colitis    PAST HISTORY:    Patient seen for follow up of colitis, microscopic, diagnosed on biopsy, associated with greater than 10 liquid nonbloody BMs per day, remote onset.  She has now been on budesonide 9 mg daily and is having 4 formed BMs per day with no nocturnal symptoms.  She needs medication refill.  No other complaints.    INTERVAL HISTORY:  Since last visit, patient attempted to wean budesonide but noted recurrence of diarrhea upon decreasing dose to 6 mg daily.  She resumed 9 mg daily and has had some improvement, but still has about 5 BMs per day which are very loose.  No bleeding.  Denies any fecal soiling or incontinence.  Admits to some nocturnal symptoms.  Has had trouble recapturing response.     Review of Systems   HENT:  Negative for trouble swallowing.    Respiratory:  Negative for shortness of breath and wheezing.    Cardiovascular:  Negative for palpitations.   Gastrointestinal:  Negative for blood in stool.   Genitourinary:  Negative for dysuria and hematuria.   Integumentary:  Negative for color change.   Neurological:  Negative for dizziness.   Hematological:  Negative for adenopathy.   Psychiatric/Behavioral:  Negative for confusion. The patient is not nervous/anxious.    All other systems reviewed and are negative.         Objective     Vitals:    04/22/25 1436   Weight: 89.1 kg (196 lb 6.9 oz)   Height: 5' 2" (1.575 m)         Physical Exam  Constitutional:       Appearance: She is well-developed.   HENT:      Head: Normocephalic and atraumatic.   Eyes:      General: No scleral icterus.     Pupils: Pupils are equal, round, and reactive to light.   Cardiovascular:      Rate and Rhythm: Normal rate and regular rhythm.      Heart sounds: No murmur heard.  Pulmonary:      Effort: Pulmonary effort is normal.      Breath sounds: Normal breath sounds. No wheezing. "   Abdominal:      General: Bowel sounds are normal. There is no distension.      Palpations: Abdomen is soft.      Tenderness: There is no abdominal tenderness.   Musculoskeletal:         General: No tenderness.      Cervical back: Normal range of motion.   Lymphadenopathy:      Cervical: No cervical adenopathy.   Skin:     General: Skin is warm and dry.      Findings: No rash.   Neurological:      Mental Status: She is alert.       Lab Results   Component Value Date    WBC 7.38 01/24/2025    HGB 13.7 01/24/2025    HCT 42.0 01/24/2025    MCV 88 01/24/2025     01/24/2025         CMP  Sodium   Date Value Ref Range Status   01/24/2025 141 136 - 145 mmol/L Final     Potassium   Date Value Ref Range Status   01/24/2025 4.1 3.5 - 5.1 mmol/L Final     Chloride   Date Value Ref Range Status   01/24/2025 107 95 - 110 mmol/L Final     CO2   Date Value Ref Range Status   01/24/2025 25 23 - 29 mmol/L Final     Glucose   Date Value Ref Range Status   01/24/2025 94 70 - 110 mg/dL Final     BUN   Date Value Ref Range Status   01/24/2025 15 8 - 23 mg/dL Final     Creatinine   Date Value Ref Range Status   01/24/2025 0.7 0.5 - 1.4 mg/dL Final     Calcium   Date Value Ref Range Status   01/24/2025 9.7 8.7 - 10.5 mg/dL Final     Total Protein   Date Value Ref Range Status   01/24/2025 6.7 6.0 - 8.4 g/dL Final     Albumin   Date Value Ref Range Status   01/24/2025 3.5 3.5 - 5.2 g/dL Final     Total Bilirubin   Date Value Ref Range Status   01/24/2025 0.9 0.1 - 1.0 mg/dL Final     Comment:     For infants and newborns, interpretation of results should be based  on gestational age, weight and in agreement with clinical  observations.    Premature Infant recommended reference ranges:  Up to 24 hours.............<8.0 mg/dL  Up to 48 hours............<12.0 mg/dL  3-5 days..................<15.0 mg/dL  6-29 days.................<15.0 mg/dL       Alkaline Phosphatase   Date Value Ref Range Status   01/24/2025 97 40 - 150 U/L Final      AST   Date Value Ref Range Status   01/24/2025 13 10 - 40 U/L Final     ALT   Date Value Ref Range Status   01/24/2025 16 10 - 44 U/L Final     Anion Gap   Date Value Ref Range Status   01/24/2025 9 8 - 16 mmol/L Final     eGFR   Date Value Ref Range Status   01/24/2025 >60.0 >60 mL/min/1.73 m^2 Final            Assessment and Plan     1. Chronic diarrhea  -     IgA; Future; Expected date: 04/22/2025  -     Tissue Transglutaminase, IgA; Future; Expected date: 04/22/2025  -     Calprotectin, Stool; Future; Expected date: 04/22/2025  -     Clostridioides difficile EIA; Future; Expected date: 04/22/2025  -     Fecal fat, qualitative; Future; Expected date: 04/22/2025  -     Giardia / Cryptosporidum, EIA; Future; Expected date: 04/22/2025  -     Pancreatic elastase, fecal; Future; Expected date: 04/22/2025  -     pH, stool; Future; Expected date: 04/22/2025  -     Stool culture; Future; Expected date: 04/22/2025  -     Stool Exam-Ova,Cysts,Parasites; Future; Expected date: 04/22/2025  -     WBC, Stool; Future; Expected date: 04/22/2025    2. Microscopic colitis, unspecified microscopic colitis type  -     budesonide (ENTOCORT EC) 3 mg capsule; Take 3 capsules (9 mg total) by mouth once daily.  Dispense: 270 capsule; Refill: 3        Recheck stool studies to rule out alternative causes of diarrhea  Check celiac screen  Increase fiber  Avoid NSAIDs  Continue budesonide at 9 mg daily  Add pepto bismol BID to attempt to recapture response.  Follow up in 6 weeks.         No follow-ups on file.

## 2025-05-12 DIAGNOSIS — G25.81 RLS (RESTLESS LEGS SYNDROME): ICD-10-CM

## 2025-05-12 RX ORDER — GABAPENTIN 300 MG/1
300 CAPSULE ORAL
Qty: 90 CAPSULE | Refills: 3 | Status: SHIPPED | OUTPATIENT
Start: 2025-05-12

## 2025-05-12 NOTE — TELEPHONE ENCOUNTER
No care due was identified.  [facility] Embedded Care Due Messages. Reference number: [id number].   5/12/2025 3:28:33 AM CDT

## 2025-05-14 ENCOUNTER — OFFICE VISIT (OUTPATIENT)
Dept: UROGYNECOLOGY | Facility: CLINIC | Age: 70
End: 2025-05-14
Payer: MEDICARE

## 2025-05-14 ENCOUNTER — PATIENT MESSAGE (OUTPATIENT)
Dept: UROGYNECOLOGY | Facility: CLINIC | Age: 70
End: 2025-05-14
Payer: MEDICARE

## 2025-05-14 VITALS
WEIGHT: 197.5 LBS | SYSTOLIC BLOOD PRESSURE: 136 MMHG | BODY MASS INDEX: 36.34 KG/M2 | HEIGHT: 62 IN | HEART RATE: 62 BPM | DIASTOLIC BLOOD PRESSURE: 71 MMHG

## 2025-05-14 DIAGNOSIS — N39.46 MIXED INCONTINENCE URGE AND STRESS: Primary | ICD-10-CM

## 2025-05-14 DIAGNOSIS — R25.1 TREMORS OF NERVOUS SYSTEM: ICD-10-CM

## 2025-05-14 PROCEDURE — 3075F SYST BP GE 130 - 139MM HG: CPT | Mod: CPTII,S$GLB,, | Performed by: OBSTETRICS & GYNECOLOGY

## 2025-05-14 PROCEDURE — 3008F BODY MASS INDEX DOCD: CPT | Mod: CPTII,S$GLB,, | Performed by: OBSTETRICS & GYNECOLOGY

## 2025-05-14 PROCEDURE — 3078F DIAST BP <80 MM HG: CPT | Mod: CPTII,S$GLB,, | Performed by: OBSTETRICS & GYNECOLOGY

## 2025-05-14 PROCEDURE — 1159F MED LIST DOCD IN RCRD: CPT | Mod: CPTII,S$GLB,, | Performed by: OBSTETRICS & GYNECOLOGY

## 2025-05-14 PROCEDURE — 1100F PTFALLS ASSESS-DOCD GE2>/YR: CPT | Mod: CPTII,S$GLB,, | Performed by: OBSTETRICS & GYNECOLOGY

## 2025-05-14 PROCEDURE — 99214 OFFICE O/P EST MOD 30 MIN: CPT | Mod: S$GLB,,, | Performed by: OBSTETRICS & GYNECOLOGY

## 2025-05-14 PROCEDURE — 3288F FALL RISK ASSESSMENT DOCD: CPT | Mod: CPTII,S$GLB,, | Performed by: OBSTETRICS & GYNECOLOGY

## 2025-05-14 PROCEDURE — 3044F HG A1C LEVEL LT 7.0%: CPT | Mod: CPTII,S$GLB,, | Performed by: OBSTETRICS & GYNECOLOGY

## 2025-05-14 PROCEDURE — 99999 PR PBB SHADOW E&M-EST. PATIENT-LVL IV: CPT | Mod: PBBFAC,,, | Performed by: OBSTETRICS & GYNECOLOGY

## 2025-05-14 NOTE — PROGRESS NOTES
Urogyn follow up  05/14/2025    .  [facility]  [address] [place] [address] [address]  [facility] [id number]    Lucas Mayer  [id number]  1955      Lucas Mayer is a 70 y.o. here for a urogyn follow up for urge incontinence.    HPI 70 y.o.Y O F  has a past medical history of Cataract, Depression, Hypertension, Other and unspecified hyperlipidemia, Trichiasis, and Vertigo.  Referred by Dr. Samuels  for evaluation of urinary incontinence. This has been an issue since 2000. She feels that the symptoms overall have worsened since her hysterectomy in 2000. She now changes pads 2/3 times per day has both stress and urge incontinence- especially at capacity.      10/20/2022  80 % improvement in urinary symptoms   No longer wears a pad during the day does wear one at night -wakes up at 5:30 in the am   Using the vaginal estrogen irregularly      11/20/2024  Using Vesicare with 25-50% improvements   Bothersome dry eyes, dry mouth     Changes from last visit:  1.  Mixed urinary incontinence, urge > stress:   --has worsening incontinence/ frequency with vesicare twice daily  --stopped second dose  --voiding every 2 hours during the day   --nocturia 1/  night (early AM0  --using liner pad -- 1/ day  --did not go to pelvic PT-- only went for eval    --STRESS:  Pessary vs. Sling.       2. Fecal incontinence: improved   --using fiberwell and align  --still has some smearing 2-3 times/ week     3. Lichens Sclerosis   --using clobetasol intermittently     4. Vaginal atrophy (dryness):     --has not been using regularly    5/14/2025:  No improvement in symptoms   Has not started Gemtessa  Did not do PFPP- not interested   Has been seeing GI and started Budesonide with mimnial improvement .       [facility]     Question 6/15/2022 11:48 AM CDT - Filed by Abigail Spears MA   General Urogynecology: Are you experiencing the following?     Dysuria (painful urination) Yes   Nocturia:  waking up at night to  "empty your bladder  Yes   If you answered yes to the previous question, how many times does this happen per night? 1-2   Enuresis (urine loss during sleep) No   Dribbling urine after you urinate Yes   Hematuria (urine appears red) No   Type of stream Interrupted   Urinary frequency: How often a day are you going to the bathroom per day?  Less than 10   Urinary Tract Infections: How many Urinary Tract Infections have you had in the past year? I have not had a UTI in the past year   If you have had a UTI in the past year, what treatments have you had so far?  Cranberry supplementation     I have not had a UTI in the past year   Urinary Incontinence (General): Are you experiencing the following?     Past consultation for incontinence: Have you ever seen someone for the evaluation of incontinence? Yes   If you answered yes to the previous question, please select all the therapies you have tried.  [facility]     Pelvic floor physical therapy   Please note the effectiveness of the therapies. Somewhat effective   Need to wear protection to keep clothes dry  Yes   If you answered yes to the previous question, please eduardo the protection you use.  Panty liner   If you wear protection, how much wetness is typically on each pad? Slight   If you wear protection, how often do you have to change per day, if applicable?  2   Stress Symptoms: Are you experiencing the following?     Leakage of urine with cough, laugh and/or sneeze Yes   If you answered yes to the previous question, what is the frequency in days, weeks and/or months? Daily   Leakage of urine with sex No   Leakage of urine with bending/ lifting Yes   Leakage of urine with briskly walking or jogging Yes   If you lose urine for any other reason not previously mentioned, please note it below, if applicable.  just seeing a toilet will make make me have to go   Urge Symptoms: Are you experiencing the following?     Urgency ("got to go" feeling) Yes   Urge: How frequently do you " "feel an urge to urinate (feeling like you "gotta go" to the bathroom and can't wait) Daily   Do you experience a leakage of urine when you have a feeling of urgency?  Yes   Leakage of urine when unaware Yes   Past use of anticholinergics (medications used to treat overactive bladder) No   If you answered yes to the previous question, please eduardo the anticholinergics you have used:      Have you ever used Mirbetriq (aka Mirabegron)?  No   Prolapse Symptoms: Are you experiencing any of the following?      Falling out/ Bulging/ Heaviness in the vagina Yes   Vaginal/ Abdominal Pain/ Pressure Yes   Need to strain/ Push to void Yes   Need to wait on the toilet before you void Yes   Unusual position to urinate (using your hands to push back the vaginal bulge) No   Sensation of incomplete emptying Yes   Past use of pessary device No   If you answered yes to the previous question, please list the devices you have used below.      Bowel Symptoms: Are you experiencing any of the following?     Constipation No   Diarrhea  Yes   Hematochezia (bloody stool) No   Incomplete evacuation of stool Yes   Involuntary loss of formed stool Yes   Fecal smearing/urgency Yes   Involuntary loss of gas Yes   Vaginal Symptoms: Are you experiencing any of the following?      Abnormal vaginal bleeding  No   Vaginal dryness No   Sexually active  No   Dyspareunia (painful intercourse) No   Estrogen use  No            Past Medical History:   Diagnosis Date    Cataract     Depression     Hypertension     Other and unspecified hyperlipidemia     Trichiasis     Vertigo        Past Surgical History:   Procedure Laterality Date    BLEPHAROPLASTY, UPPER EYELID      CATARACT EXTRACTION W/  INTRAOCULAR LENS IMPLANT Left 12/03/2018    Dr Foreman     CATARACT EXTRACTION W/  INTRAOCULAR LENS IMPLANT Left 12/03/2018    Procedure: EXTRACTION, CATARACT, WITH IOL INSERTION;  Surgeon: Emilie Foreman MD;  Location: [facility];  Service: Ophthalmology;  Laterality: Left; "    CATARACT EXTRACTION W/  INTRAOCULAR LENS IMPLANT Right 02/04/2019    Procedure: EXTRACTION, CATARACT, WITH IOL INSERTION;  Surgeon: Emilie Foreman MD;  Location: [facility];  Service: Ophthalmology;  Laterality: Right;    COLONOSCOPY, SCREENING, HIGH RISK PATIENT N/A 12/19/2024    Procedure: COLONOSCOPY, SCREENING, HIGH RISK PATIENT;  Surgeon: Hernandez Cordero MD;  Location: [facility];  Service: Endoscopy;  Laterality: N/A;    EYE SURGERY Bilateral     bilateral    HYSTERECTOMY  08/2001    fibroids- BK/BSO    OOPHORECTOMY      TONSILLECTOMY      WISDOM TOOTH EXTRACTION         Family History   Problem Relation Name Age of Onset    Diabetes Mother      Stroke Mother      Essential Tremor Mother      Melanoma Father      Hypertension Father      Essential Tremor Sister      Essential Tremor Sister      Breast cancer Neg Hx      Cancer Neg Hx      Eclampsia Neg Hx         Social History     Socioeconomic History    Marital status:    Tobacco Use    Smoking status: Never    Smokeless tobacco: Never   Substance and Sexual Activity    Alcohol use: Yes     Alcohol/week: 0.8 standard drinks of alcohol     Types: 1 Standard drinks or equivalent per week     Comment: Social    Drug use: No    Sexual activity: Not Currently     Partners: Male     Social Drivers of Health     Financial Resource Strain: Low Risk  (2/21/2021)    Overall Financial Resource Strain (CARDIA)     Difficulty of Paying Living Expenses: Not hard at all   Food Insecurity: No Food Insecurity (2/21/2021)    Hunger Vital Sign     Worried About Running Out of Food in the Last Year: Never true     Ran Out of Food in the Last Year: Never true   Transportation Needs: No Transportation Needs (2/21/2021)    PRAPARE - Transportation     Lack of Transportation (Medical): No     Lack of Transportation (Non-Medical): No   Physical Activity: Unknown (2/21/2021)    Exercise Vital Sign     Days of Exercise per Week: 0 days   Stress: No Stress Concern Present  "(2/21/2021)    [employer] - Occupational Stress Questionnaire     Feeling of Stress : Not at all       Current Outpatient Medications   Medication Sig Dispense Refill    amLODIPine (NORVASC) 5 MG tablet TAKE 1 TABLET BY MOUTH ONCE  DAILY 90 tablet 3    atorvastatin (LIPITOR) 40 MG tablet Take 1 tablet (40 mg total) by mouth once daily. 90 tablet 3    budesonide (ENTOCORT EC) 3 mg capsule Take 9 mg by mouth once daily.      budesonide (ENTOCORT EC) 3 mg capsule Take 3 capsules (9 mg total) by mouth once daily. 270 capsule 3    citalopram (CELEXA) 20 MG tablet TAKE 1 TABLET BY MOUTH ONCE  DAILY 90 tablet 3    clobetasoL (TEMOVATE) 0.05 % cream Apply topically 2 (two) times daily. 60 g 1    conjugated estrogens (PREMARIN) vaginal cream 1 g with applicator or dime-sized amt with finger in vagina nightly x 2 weeks, then twice a week thereafter 45 g 12    diazePAM (VALIUM) 2 MG tablet Take 1 tablet (2 mg total) by mouth every 8 (eight) hours as needed. 60 tablet 0    diphenoxylate-atropine 2.5-0.025 mg (LOMOTIL) 2.5-0.025 mg per tablet Take 1 tablet by mouth 4 (four) times daily as needed for Diarrhea. 30 tablet 1    gabapentin (NEURONTIN) 300 MG capsule TAKE 1 CAPSULE BY MOUTH IN THE  EVENING 90 capsule 3    vibegron 75 mg Tab Take 1 tablet (75 mg total) by mouth nightly. 30 tablet 11     No current facility-administered medications for this visit.       Review of patient's allergies indicates:   Allergen Reactions    Ancef [cefazolin] Anaphylaxis    Codeine Nausea And Vomiting    Pcn [penicillins] Rash       Well woman:  Pap's: No history of abnormal, none recently   Mammo: BIRADS: 1; Last imaging  2021  Colonoscopy: N/a: normal   Dexa: Normal bone mineral density 2022    ROS:  As per HPI.      Exam  /71 (BP Location: Left arm, Patient Position: Sitting)   Pulse 62   Ht 5' 2" (1.575 m)   Wt 89.6 kg (197 lb 8 oz)   BMI 36.12 kg/m²   General: alert and oriented, no acute " distress  Respiratory: normal respiratory effort  Abd: soft, non-tender, non-distended    Pelvic--deferred    Impression  1. Mixed incontinence urge and stress  Simple Urodynamics    Ambulatory Referral/Consult to Physical Therapy      2. Tremors of nervous system  Ambulatory referral/consult to Neurology            We reviewed the above issues and discussed options for short-term versus long-term management of her problems.     Plan:   1.  Mixed urinary incontinence, urge > stress:   --Bladder diary for 3-7 days, write the number of times you go to the rest room and associated symptoms of urgency or leakage.   --Empty bladder every 3 hours.  Empty well: wait a minute, lean forward on toilet.    --Avoid dietary irritants (see sheet).  Keep diary x 3-5 days to determine your irritants.  --KEGELS: do 10 in AM and 10 in PM, holding each x 10 seconds.  When you feel urge to go, STOP, KEGEL, and when urge has passed, then go to bathroom.  Start pelvic floor  PT (wants to have the PT in [place]) .    --URGE:stop vesicare 5 mg daily, start Gemtessa 75 mg daily  SE profile reviewed.   Takes 2-4 weeks to see if will have effect.  For dry mouth: get sour, sugar free lozenge or gum.    --STRESS:  Pessary vs. Sling.   --discussed benefits of SNS   --SUDS if no improvement      2. Fecal incontinence:about the same  --continue Lomotil as needed   If no improvement recommend CR evaluation   Discussed options      3. Lichens Sclerosis   --use clobetasol as needed --twice weekly  Ok to use along the perineum      4.Vaginal atrophy (dryness):  Use 1 gram of estrogen cream in vagina nightly x 2 weeks, then twice a week thereafter.      5. Worsening Tremors and memory issues   --Recommend Neurology evaluation     Isabel Sweet DO  Female Pelvic Medicine and Reconstructive Surgery  [facility] [place], [place]

## 2025-05-14 NOTE — PATIENT INSTRUCTIONS
1.  Mixed urinary incontinence, urge > stress:   --Bladder diary for 3-7 days, write the number of times you go to the rest room and associated symptoms of urgency or leakage.   --Empty bladder every 3 hours.  Empty well: wait a minute, lean forward on toilet.    --Avoid dietary irritants (see sheet).  Keep diary x 3-5 days to determine your irritants.  --KEGELS: do 10 in AM and 10 in PM, holding each x 10 seconds.  When you feel urge to go, STOP, KEGEL, and when urge has passed, then go to bathroom.  Start pelvic floor  PT (wants to have the PT in [place]) .    --URGE:stop vesicare 5 mg daily, start Gemtessa 75 mg daily  SE profile reviewed.   Takes 2-4 weeks to see if will have effect.  For dry mouth: get sour, sugar free lozenge or gum.    --STRESS:  Pessary vs. Sling.   --discussed SNS   --SUDS if no improvement      2. Fecal incontinence:  --continue Lomotil as needed   If no improvement recommend CR evaluation   Discussed options      3. Lichens Sclerosis   --use clobetasol as needed --twice weekly  Ok to use along the perineum      4.Vaginal atrophy (dryness):  Use 1 gram of estrogen cream in vagina nightly x 2 weeks, then twice a week thereafter.      5. Tremors and memory issues   --Neurology

## 2025-06-06 ENCOUNTER — PATIENT MESSAGE (OUTPATIENT)
Dept: UROGYNECOLOGY | Facility: CLINIC | Age: 70
End: 2025-06-06
Payer: MEDICARE

## 2025-06-16 DIAGNOSIS — F32.A DEPRESSION, UNSPECIFIED DEPRESSION TYPE: ICD-10-CM

## 2025-06-17 RX ORDER — DIAZEPAM 2 MG/1
2 TABLET ORAL EVERY 8 HOURS PRN
Qty: 60 TABLET | Refills: 0 | Status: SHIPPED | OUTPATIENT
Start: 2025-06-17

## 2025-06-17 NOTE — TELEPHONE ENCOUNTER
Medication pending  Allergies reviewed   Lov 03/28/2025        Refill Encounter    PCP Visits: Recent Visits  Date Type Provider Dept   03/28/25 Office Visit Jean Calros Samuels MD [facility] Internal Medicine   Showing recent visits within past 360 days and meeting all other requirements  Future Appointments  No visits were found meeting these conditions.  Showing future appointments within next 720 days and meeting all other requirements      Last 3 Blood Pressure:   BP Readings from Last 3 Encounters:   05/14/25 136/71   03/28/25 122/70   12/24/24 119/66     Preferred Pharmacy:   [facility] - [place], [place] - [address]  [address]  [place] [address]  Phone: [phone] Fax: [phone]    Requested RX:  Requested Prescriptions     Pending Prescriptions Disp Refills    diazePAM (VALIUM) 2 MG tablet 60 tablet 0     Sig: Take 1 tablet (2 mg total) by mouth every 8 (eight) hours as needed.      RX Route: Normal

## 2025-06-17 NOTE — TELEPHONE ENCOUNTER
No care due was identified.  [facility] Embedded Care Due Messages. Reference number: [id number].   6/16/2025 7:00:04 PM CDT

## 2025-06-23 ENCOUNTER — PATIENT MESSAGE (OUTPATIENT)
Dept: UROGYNECOLOGY | Facility: CLINIC | Age: 70
End: 2025-06-23
Payer: MEDICARE

## 2025-06-23 ENCOUNTER — OFFICE VISIT (OUTPATIENT)
Dept: ORTHOPEDICS | Facility: CLINIC | Age: 70
End: 2025-06-23
Payer: MEDICARE

## 2025-06-23 ENCOUNTER — PATIENT MESSAGE (OUTPATIENT)
Dept: ORTHOPEDICS | Facility: CLINIC | Age: 70
End: 2025-06-23
Payer: MEDICARE

## 2025-06-23 ENCOUNTER — HOSPITAL ENCOUNTER (OUTPATIENT)
Dept: RADIOLOGY | Facility: HOSPITAL | Age: 70
Discharge: HOME | End: 2025-06-23
Attending: ORTHOPAEDIC SURGERY
Payer: MEDICARE

## 2025-06-23 ENCOUNTER — PATIENT MESSAGE (OUTPATIENT)
Dept: GASTROENTEROLOGY | Facility: CLINIC | Age: 70
End: 2025-06-23
Payer: MEDICARE

## 2025-06-23 ENCOUNTER — PATIENT MESSAGE (OUTPATIENT)
Dept: INTERNAL MEDICINE | Facility: CLINIC | Age: 70
End: 2025-06-23
Payer: MEDICARE

## 2025-06-23 VITALS — WEIGHT: 198.44 LBS | BODY MASS INDEX: 36.52 KG/M2 | HEIGHT: 62 IN

## 2025-06-23 DIAGNOSIS — Z01.818 PRE-OP TESTING: ICD-10-CM

## 2025-06-23 DIAGNOSIS — M25.561 BILATERAL CHRONIC KNEE PAIN: Primary | ICD-10-CM

## 2025-06-23 DIAGNOSIS — M17.0 PRIMARY OSTEOARTHRITIS OF BOTH KNEES: Primary | ICD-10-CM

## 2025-06-23 DIAGNOSIS — M25.562 BILATERAL CHRONIC KNEE PAIN: ICD-10-CM

## 2025-06-23 DIAGNOSIS — M25.562 BILATERAL CHRONIC KNEE PAIN: Primary | ICD-10-CM

## 2025-06-23 DIAGNOSIS — G89.29 BILATERAL CHRONIC KNEE PAIN: ICD-10-CM

## 2025-06-23 DIAGNOSIS — G89.29 BILATERAL CHRONIC KNEE PAIN: Primary | ICD-10-CM

## 2025-06-23 DIAGNOSIS — M25.561 BILATERAL CHRONIC KNEE PAIN: ICD-10-CM

## 2025-06-23 PROCEDURE — 73562 X-RAY EXAM OF KNEE 3: CPT | Mod: 26,50,, | Performed by: RADIOLOGY

## 2025-06-23 PROCEDURE — 3288F FALL RISK ASSESSMENT DOCD: CPT | Mod: CPTII,S$GLB,, | Performed by: ORTHOPAEDIC SURGERY

## 2025-06-23 PROCEDURE — 1100F PTFALLS ASSESS-DOCD GE2>/YR: CPT | Mod: CPTII,S$GLB,, | Performed by: ORTHOPAEDIC SURGERY

## 2025-06-23 PROCEDURE — 73562 X-RAY EXAM OF KNEE 3: CPT | Mod: TC,50,PN

## 2025-06-23 PROCEDURE — 99215 OFFICE O/P EST HI 40 MIN: CPT | Mod: S$GLB,,, | Performed by: ORTHOPAEDIC SURGERY

## 2025-06-23 PROCEDURE — 1159F MED LIST DOCD IN RCRD: CPT | Mod: CPTII,S$GLB,, | Performed by: ORTHOPAEDIC SURGERY

## 2025-06-23 PROCEDURE — 1160F RVW MEDS BY RX/DR IN RCRD: CPT | Mod: CPTII,S$GLB,, | Performed by: ORTHOPAEDIC SURGERY

## 2025-06-23 PROCEDURE — 99999 PR PBB SHADOW E&M-EST. PATIENT-LVL III: CPT | Mod: PBBFAC,,, | Performed by: ORTHOPAEDIC SURGERY

## 2025-06-23 PROCEDURE — 3044F HG A1C LEVEL LT 7.0%: CPT | Mod: CPTII,S$GLB,, | Performed by: ORTHOPAEDIC SURGERY

## 2025-06-23 PROCEDURE — 3008F BODY MASS INDEX DOCD: CPT | Mod: CPTII,S$GLB,, | Performed by: ORTHOPAEDIC SURGERY

## 2025-06-23 PROCEDURE — 1125F AMNT PAIN NOTED PAIN PRSNT: CPT | Mod: CPTII,S$GLB,, | Performed by: ORTHOPAEDIC SURGERY

## 2025-06-23 RX ORDER — MUPIROCIN 20 MG/G
OINTMENT TOPICAL
OUTPATIENT
Start: 2025-06-23

## 2025-06-23 NOTE — H&P (VIEW-ONLY)
Subjective:      Patient ID: Lucas Mayer is a 70 y.o. female.    Chief Complaint: Knee Injury (DOI: 06/19/2025- fell on both knees)    HPI  The patient presents with the ongoing left greater than right bilateral knee pain.  Symptoms increased after a recent fall.  Describing pain that interferes with the activities of daily living.  Got some short-term relief from her previous left knee injection  ROS      Objective:    Ortho Exam     Constitutional:   Patient is alert  and oriented in no acute distress  HEENT:  normocephalic atraumatic; PERRL EOMI  Neck:  Supple without adenopathy  Cardiovascular:  Normal rate and rhythm  Pulmonary:  Normal respiratory effort normal chest wall expansion  Abdominal:  Nonprotuberant nondistended  Musculoskeletal:  Patient has a minimally antalgic gait  He had bilateral knee range of motion pain with the extremes  She has diffuse joint line tenderness and crepitus with range of motion  Slight bilateral varus knees  Neurological:  No focal defect; cranial nerves 2-12 grossly intact  Psychiatric/behavioral:  Mood and behavior normal      X-Ray Knee 3 View Bilateral  EXAMINATION:  XR KNEE 3 VIEW BILATERAL    CLINICAL HISTORY:  Pain in right knee    TECHNIQUE:  AP, lateral, and Merchant views of both knees were performed.    COMPARISON:  05/24/2023.    FINDINGS:  Chronic severe degenerative osteoarthrosis of the bilateral medial compartments with joint space narrowing and osteophyte formation.  This has progressed over the interval.  This results in mild bilateral varus angulation of the knees.  There are small bilateral suprapatellar effusions.    Electronically signed by: [place] Blas  Date:    06/23/2025  Time:    09:08       My Radiographs Findings:    Radiographs show severe degenerative change medial compartment with the obliteration of joint space and subchondral sclerosis osteophyte formation with the other tricompartmental degenerative changes.  Assessment:        Encounter Diagnosis   Name Primary?    Primary osteoarthritis of both knees Yes         Plan:       I have discussed medical condition and treatment options with the patient at length.  After review of radiographs and previous significant treatment that is included relative rest activity restrictions NSAIDs injections and physical therapy up at the think at this point they are a candidate for consideration for total knee replacement.  I have discussed indications alternatives and potential complications of the planned left total knee replacement as this is her most symptomatic side including but not limited to bleeding infection damage to neurovascular structures ongoing pain joint instability or joint stiffness hardware failure and fracture and need for further surgery as well as other medical and anesthetic complications such as cardiopulmonary events and or blood clots.  Patient expresses understanding and agrees to proceed.  Today the patient has history and physical were accomplished and although their questions were answered in layman's terms they could understand.  We will set this up at their convenience and proceed after appropriate medical and anesthetic clearance.  We will see the patient back postop sooner if there is any questions or problems.    The mobility limitation cannot be sufficiently resolved by the use of a cane. Patient's functional mobility deficit can be sufficiently resolved with the use of a (Rolling Walker or Walker). Patient's mobility limitation significantly impairs their ability to participate in one of more activities of daily living. The use of a (RW or Walker) will significantly improve the patient's ability to participate in MRADLS and the patient will use it on regular basis in the home.     Past Medical History:   Diagnosis Date    Cataract     Depression     Hypertension     Other and unspecified hyperlipidemia     Trichiasis     Vertigo      Past Surgical History:    Procedure Laterality Date    BLEPHAROPLASTY, UPPER EYELID      CATARACT EXTRACTION W/  INTRAOCULAR LENS IMPLANT Left 12/03/2018    Dr Foreman     CATARACT EXTRACTION W/  INTRAOCULAR LENS IMPLANT Left 12/03/2018    Procedure: EXTRACTION, CATARACT, WITH IOL INSERTION;  Surgeon: Emilie Foreman MD;  Location: [facility];  Service: Ophthalmology;  Laterality: Left;    CATARACT EXTRACTION W/  INTRAOCULAR LENS IMPLANT Right 02/04/2019    Procedure: EXTRACTION, CATARACT, WITH IOL INSERTION;  Surgeon: Emilie Foreman MD;  [facility];  Service: Ophthalmology;  Laterality: Right;    COLONOSCOPY, SCREENING, HIGH RISK PATIENT N/A 12/19/2024    Procedure: COLONOSCOPY, SCREENING, HIGH RISK PATIENT;  Surgeon: Hernandez Cordero MD;  Location: [facility];  Service: Endoscopy;  Laterality: N/A;    EYE SURGERY Bilateral     bilateral    HYSTERECTOMY  08/2001    fibroids- BK/BSO    OOPHORECTOMY      TONSILLECTOMY      WISDOM TOOTH EXTRACTION         Current Medications[1]    Review of patient's allergies indicates:   Allergen Reactions    Ancef [cefazolin] Anaphylaxis    Codeine Nausea And Vomiting    Pcn [penicillins] Rash       Family History   Problem Relation Name Age of Onset    Diabetes Mother      Stroke Mother      Essential Tremor Mother      Melanoma Father      Hypertension Father      Essential Tremor Sister      Essential Tremor Sister      Breast cancer Neg Hx      Cancer Neg Hx      Eclampsia Neg Hx       Social History     Occupational History    Not on file   Tobacco Use    Smoking status: Never     Passive exposure: Never    Smokeless tobacco: Never   Substance and Sexual Activity    Alcohol use: Yes     Alcohol/week: 0.8 standard drinks of alcohol     Types: 1 Standard drinks or equivalent per week     Comment: Social    Drug use: No    Sexual activity: Not Currently     Partners: Male            [1]   Current Outpatient Medications:     amLODIPine (NORVASC) 5 MG tablet, TAKE 1 TABLET BY MOUTH ONCE  DAILY, Disp: 90  tablet, Rfl: 3    atorvastatin (LIPITOR) 40 MG tablet, Take 1 tablet (40 mg total) by mouth once daily., Disp: 90 tablet, Rfl: 3    budesonide (ENTOCORT EC) 3 mg capsule, Take 9 mg by mouth once daily., Disp: , Rfl:     budesonide (ENTOCORT EC) 3 mg capsule, Take 3 capsules (9 mg total) by mouth once daily., Disp: 270 capsule, Rfl: 3    citalopram (CELEXA) 20 MG tablet, TAKE 1 TABLET BY MOUTH ONCE  DAILY, Disp: 90 tablet, Rfl: 3    clobetasoL (TEMOVATE) 0.05 % cream, Apply topically 2 (two) times daily., Disp: 60 g, Rfl: 1    conjugated estrogens (PREMARIN) vaginal cream, 1 g with applicator or dime-sized amt with finger in vagina nightly x 2 weeks, then twice a week thereafter, Disp: 45 g, Rfl: 12    diazePAM (VALIUM) 2 MG tablet, Take 1 tablet (2 mg total) by mouth every 8 (eight) hours as needed., Disp: 60 tablet, Rfl: 0    diphenoxylate-atropine 2.5-0.025 mg (LOMOTIL) 2.5-0.025 mg per tablet, Take 1 tablet by mouth 4 (four) times daily as needed for Diarrhea., Disp: 30 tablet, Rfl: 1    gabapentin (NEURONTIN) 300 MG capsule, TAKE 1 CAPSULE BY MOUTH IN THE  EVENING, Disp: 90 capsule, Rfl: 3    vibegron 75 mg Tab, Take 1 tablet (75 mg total) by mouth nightly., Disp: 30 tablet, Rfl: 11

## 2025-06-23 NOTE — PROGRESS NOTES
Subjective:      Patient ID: Lucas Mayer is a 70 y.o. female.    Chief Complaint: Knee Injury (DOI: 06/19/2025- fell on both knees)    HPI  The patient presents with the ongoing left greater than right bilateral knee pain.  Symptoms increased after a recent fall.  Describing pain that interferes with the activities of daily living.  Got some short-term relief from her previous left knee injection  ROS      Objective:    Ortho Exam     Constitutional:   Patient is alert  and oriented in no acute distress  HEENT:  normocephalic atraumatic; PERRL EOMI  Neck:  Supple without adenopathy  Cardiovascular:  Normal rate and rhythm  Pulmonary:  Normal respiratory effort normal chest wall expansion  Abdominal:  Nonprotuberant nondistended  Musculoskeletal:  Patient has a minimally antalgic gait  He had bilateral knee range of motion pain with the extremes  She has diffuse joint line tenderness and crepitus with range of motion  Slight bilateral varus knees  Neurological:  No focal defect; cranial nerves 2-12 grossly intact  Psychiatric/behavioral:  Mood and behavior normal      X-Ray Knee 3 View Bilateral  EXAMINATION:  XR KNEE 3 VIEW BILATERAL    CLINICAL HISTORY:  Pain in right knee    TECHNIQUE:  AP, lateral, and Merchant views of both knees were performed.    COMPARISON:  05/24/2023.    FINDINGS:  Chronic severe degenerative osteoarthrosis of the bilateral medial compartments with joint space narrowing and osteophyte formation.  This has progressed over the interval.  This results in mild bilateral varus angulation of the knees.  There are small bilateral suprapatellar effusions.    Electronically signed by: Gonzales Blas  Date:    06/23/2025  Time:    09:08       My Radiographs Findings:    Radiographs show severe degenerative change medial compartment with the obliteration of joint space and subchondral sclerosis osteophyte formation with the other tricompartmental degenerative changes.  Assessment:        Encounter Diagnosis   Name Primary?    Primary osteoarthritis of both knees Yes         Plan:       I have discussed medical condition and treatment options with the patient at length.  After review of radiographs and previous significant treatment that is included relative rest activity restrictions NSAIDs injections and physical therapy up at the think at this point they are a candidate for consideration for total knee replacement.  I have discussed indications alternatives and potential complications of the planned left total knee replacement as this is her most symptomatic side including but not limited to bleeding infection damage to neurovascular structures ongoing pain joint instability or joint stiffness hardware failure and fracture and need for further surgery as well as other medical and anesthetic complications such as cardiopulmonary events and or blood clots.  Patient expresses understanding and agrees to proceed.  Today the patient has history and physical were accomplished and although their questions were answered in layman's terms they could understand.  We will set this up at their convenience and proceed after appropriate medical and anesthetic clearance.  We will see the patient back postop sooner if there is any questions or problems.    The mobility limitation cannot be sufficiently resolved by the use of a cane. Patient's functional mobility deficit can be sufficiently resolved with the use of a (Rolling Walker or Walker). Patient's mobility limitation significantly impairs their ability to participate in one of more activities of daily living. The use of a (RW or Walker) will significantly improve the patient's ability to participate in MRADLS and the patient will use it on regular basis in the home.     Past Medical History:   Diagnosis Date    Cataract     Depression     Hypertension     Other and unspecified hyperlipidemia     Trichiasis     Vertigo      Past Surgical History:    Procedure Laterality Date    BLEPHAROPLASTY, UPPER EYELID      CATARACT EXTRACTION W/  INTRAOCULAR LENS IMPLANT Left 12/03/2018    Dr Foreman     CATARACT EXTRACTION W/  INTRAOCULAR LENS IMPLANT Left 12/03/2018    Procedure: EXTRACTION, CATARACT, WITH IOL INSERTION;  Surgeon: Emilie Foreman MD;  Location: [facility];  Service: Ophthalmology;  Laterality: Left;    CATARACT EXTRACTION W/  INTRAOCULAR LENS IMPLANT Right 02/04/2019    Procedure: EXTRACTION, CATARACT, WITH IOL INSERTION;  Surgeon: Emilie Foreman MD;  Location: [facility] OR;  Service: Ophthalmology;  Laterality: Right;    COLONOSCOPY, SCREENING, HIGH RISK PATIENT N/A 12/19/2024    Procedure: COLONOSCOPY, SCREENING, HIGH RISK PATIENT;  Surgeon: Hernandez Cordero MD;  Location: [facility];  Service: Endoscopy;  Laterality: N/A;    EYE SURGERY Bilateral     bilateral    HYSTERECTOMY  08/2001    fibroids- BK/BSO    OOPHORECTOMY      TONSILLECTOMY      WISDOM TOOTH EXTRACTION         Current Medications[1]    Review of patient's allergies indicates:   Allergen Reactions    Ancef [cefazolin] Anaphylaxis    Codeine Nausea And Vomiting    Pcn [penicillins] Rash       Family History   Problem Relation Name Age of Onset    Diabetes Mother      Stroke Mother      Essential Tremor Mother      Melanoma Father      Hypertension Father      Essential Tremor Sister      Essential Tremor Sister      Breast cancer Neg Hx      Cancer Neg Hx      Eclampsia Neg Hx       Social History     Occupational History    Not on file   Tobacco Use    Smoking status: Never     Passive exposure: Never    Smokeless tobacco: Never   Substance and Sexual Activity    Alcohol use: Yes     Alcohol/week: 0.8 standard drinks of alcohol     Types: 1 Standard drinks or equivalent per week     Comment: Social    Drug use: No    Sexual activity: Not Currently     Partners: Male            [1]   Current Outpatient Medications:     amLODIPine (NORVASC) 5 MG tablet, TAKE 1 TABLET BY MOUTH ONCE  DAILY, Disp: 90  tablet, Rfl: 3    atorvastatin (LIPITOR) 40 MG tablet, Take 1 tablet (40 mg total) by mouth once daily., Disp: 90 tablet, Rfl: 3    budesonide (ENTOCORT EC) 3 mg capsule, Take 9 mg by mouth once daily., Disp: , Rfl:     budesonide (ENTOCORT EC) 3 mg capsule, Take 3 capsules (9 mg total) by mouth once daily., Disp: 270 capsule, Rfl: 3    citalopram (CELEXA) 20 MG tablet, TAKE 1 TABLET BY MOUTH ONCE  DAILY, Disp: 90 tablet, Rfl: 3    clobetasoL (TEMOVATE) 0.05 % cream, Apply topically 2 (two) times daily., Disp: 60 g, Rfl: 1    conjugated estrogens (PREMARIN) vaginal cream, 1 g with applicator or dime-sized amt with finger in vagina nightly x 2 weeks, then twice a week thereafter, Disp: 45 g, Rfl: 12    diazePAM (VALIUM) 2 MG tablet, Take 1 tablet (2 mg total) by mouth every 8 (eight) hours as needed., Disp: 60 tablet, Rfl: 0    diphenoxylate-atropine 2.5-0.025 mg (LOMOTIL) 2.5-0.025 mg per tablet, Take 1 tablet by mouth 4 (four) times daily as needed for Diarrhea., Disp: 30 tablet, Rfl: 1    gabapentin (NEURONTIN) 300 MG capsule, TAKE 1 CAPSULE BY MOUTH IN THE  EVENING, Disp: 90 capsule, Rfl: 3    vibegron 75 mg Tab, Take 1 tablet (75 mg total) by mouth nightly., Disp: 30 tablet, Rfl: 11

## 2025-06-24 ENCOUNTER — ANESTHESIA EVENT (OUTPATIENT)
Dept: SURGERY | Facility: HOSPITAL | Age: 70
End: 2025-06-24
Payer: MEDICARE

## 2025-06-24 NOTE — ANESTHESIA PREPROCEDURE EVALUATION
06/24/2025  Lucas Mayer is a 70 y.o., female.   has a past surgical history that includes [place] tooth extraction; Oophorectomy; Tonsillectomy; Cataract extraction w/  intraocular lens implant (Left, 12/03/2018); Cataract extraction w/  intraocular lens implant (Left, 12/03/2018); Cataract extraction w/  intraocular lens implant (Right, 02/04/2019); Hysterectomy (08/2001); blepharoplasty, upper eyelid; Eye surgery (Bilateral); and colonoscopy, screening, high risk patient (N/A, 12/19/2024).       Pre-op Assessment    I have reviewed the Patient Summary Reports.     I have reviewed the Nursing Notes. I have reviewed the NPO Status.   I have reviewed the Medications.     Review of Systems  Anesthesia Hx:  No problems with previous Anesthesia             Denies Family Hx of Anesthesia complications.    Denies Personal Hx of Anesthesia complications.                    Social:  Non-Smoker       Hematology/Oncology:  Hematology Normal   Oncology Normal                                   EENT/Dental:  EENT/Dental Normal           Cardiovascular:     Hypertension           hyperlipidemia   ECG has been reviewed. 12/16 ECG - NSR  7/23 ECHO- · The left ventricle is normal in size with concentric hypertrophy and normal systolic function.  · The estimated ejection fraction is 68%.  · Normal left ventricular diastolic function.  · Normal right ventricular size.  · Normal central venous pressure (3 mmHg).  · The estimated PA systolic pressure is 10 mmHg.  · Small pericardial effusion.  · Mild left atrial enlargement.                             Pulmonary:        Sleep Apnea                Renal/:  Renal/ Normal                 Hepatic/GI:      Liver Disease,  Fatty liver             Musculoskeletal:  Arthritis               Neurological:    Neuromuscular Disease,       CN IV nerve palsy, right eye                             Endocrine:        Obesity / BMI > 30  Psych:  Psychiatric History  depression              Physical Exam  General: Well nourished, Cooperative, Alert and Oriented    Airway:  Mallampati: II   Mouth Opening: Normal  TM Distance: Normal  Tongue: Normal  Neck ROM: Normal ROM    Dental:  Intact    Chest/Lungs:  Clear to auscultation, Normal Respiratory Rate    Heart:  Rate: Normal  Rhythm: Regular Rhythm    Anesthesia Plan  Type of Anesthesia, risks & benefits discussed:    Anesthesia Type: Spinal  Intra-op Monitoring Plan: Standard ASA Monitors  Post Op Pain Control Plan: multimodal analgesia  Induction:  IV  Informed Consent: Informed consent signed with the Patient and all parties understand the risks and agree with anesthesia plan.  All questions answered.   ASA Score: 3  Day of Surgery Review of History & Physical: H&P Update referred to the surgeon/provider.    Ready For Surgery From Anesthesia Perspective.   .

## 2025-06-27 ENCOUNTER — HOSPITAL ENCOUNTER (OUTPATIENT)
Dept: PREADMISSION TESTING | Facility: HOSPITAL | Age: 70
Discharge: HOME | End: 2025-06-27
Attending: ORTHOPAEDIC SURGERY
Payer: MEDICARE

## 2025-06-27 VITALS
WEIGHT: 198 LBS | DIASTOLIC BLOOD PRESSURE: 66 MMHG | RESPIRATION RATE: 20 BRPM | HEIGHT: 62 IN | OXYGEN SATURATION: 98 % | SYSTOLIC BLOOD PRESSURE: 149 MMHG | BODY MASS INDEX: 36.44 KG/M2 | HEART RATE: 67 BPM | TEMPERATURE: 98 F

## 2025-06-27 DIAGNOSIS — Z96.652 S/P TOTAL KNEE ARTHROPLASTY, LEFT: Primary | ICD-10-CM

## 2025-06-27 DIAGNOSIS — M17.0 PRIMARY OSTEOARTHRITIS OF BOTH KNEES: Primary | ICD-10-CM

## 2025-06-27 PROCEDURE — 99900103 DSU ONLY-NO CHARGE-INITIAL HR (STAT)

## 2025-06-27 NOTE — PRE-PROCEDURE INSTRUCTIONS
These verbal instructions reviewed with pt-                          Someone will call you with the time to arrive the day before your surgery, do not eat or drink anything after midnight the night before your surgery, medications to take morning of surgery, have a ride home after your procedure, wear clothing big enough to fit over a bulky post-op dressing, side effects of anesthesia, do not bring any valuables to the hospital with you, remove contact lenses/retainers/dentures/jewelry prior to procedure, discharge criteria (length/criteria) and you need someone to stay with you 24 hours after your procedure/anesthesia.     Instructed to use Hibiclens soap the night before and the morning of your surgery instead of your regular soap. Shower using this soap from the neck down to your feet. Wash thoroughly for three minutes, paying special attention to the area where your surgery will be performed. Do not shave the area of your body where your surgery will be performed, do not apply any lotions, perfumes or powders after use. V/u of instructions. All questions answered. Hibiclens provided.     Anesthesia consult completed by Dr. Wilson. Joint camp completed today with Huan. Pt stated home health and physical therapy have been setup for her recovery and she stated she has a walker. Denies need for bedside commode. Instructed pt to bring the walker the day of surgery. V/u.

## 2025-06-27 NOTE — PRE ADMISSION SCREENING
JOINT CAMP ASSESSMENT    Name Lucas Mayer   MRN [id number]    Age/Sex 70 y.o. female    Surgeon Dr. Homer Steel   Joint Camp Date 6/27/2025   Surgery Date 7/8/2025   Procedure Left Knee Arthroplasty   Insurance Payor: [facility] MANAGED MCARE / Plan: [facility] MEDICARE COMPLETE / Product Type: Medicare Advantage /    Care Team Patient Care Team:  Jean Carlos Samuels MD as PCP - General (Internal Medicine)  Arnoldo Heath MD as Physician (Internal Medicine)  Isabel Sweet DO as Consulting Physician (UroGynecology )    Pharmacy   [facility] - [place] [id number] [facility]  [address]  [address]  [facility] [address]  Phone: [phone] Fax: [phone]    [facility] - [place], [place] - [address]  [address]  [place] [address]  Phone: [phone] Fax: [phone]    [facility] - [facility] [address]  [address]  [facility] [address]  Phone: [phone] Fax: [phone]     AM-PAC Score   23   Risk Assessment Score 3     Past Medical History:   Diagnosis Date    Cataract     Depression     Hypertension     Other and unspecified hyperlipidemia     Trichiasis     Vertigo        Past Surgical History:   Procedure Laterality Date    BLEPHAROPLASTY, UPPER EYELID      CATARACT EXTRACTION W/  INTRAOCULAR LENS IMPLANT Left 12/03/2018    Dr Foreman     CATARACT EXTRACTION W/  INTRAOCULAR LENS IMPLANT Left 12/03/2018    Procedure: EXTRACTION, CATARACT, WITH IOL INSERTION;  Surgeon: Emilie Foreman MD;  Location: [facility] OR;  Service: Ophthalmology;  Laterality: Left;    CATARACT EXTRACTION W/  INTRAOCULAR LENS IMPLANT Right 02/04/2019    Procedure: EXTRACTION, CATARACT, WITH IOL INSERTION;  Surgeon: Emilie Foreman MD;  Location: [facility] OR;  Service: Ophthalmology;  Laterality: Right;    COLONOSCOPY, SCREENING, HIGH RISK PATIENT N/A 12/19/2024    Procedure: COLONOSCOPY, SCREENING,  HIGH RISK PATIENT;  Surgeon: Hernandez Cordero MD;  Location: [facility];  Service: Endoscopy;  Laterality: N/A;    EYE SURGERY Bilateral     bilateral    HYSTERECTOMY  08/2001    fibroids- BK/BSO    OOPHORECTOMY      TONSILLECTOMY      WISDOM TOOTH EXTRACTION           Home Enviroment     Living Arrangement: Lives alone  Home Environment: 1-story house/ trailer, number of outside stairs: 1, walk-in shower  Home Safety Concerns: Pets in the home: dogs (1).    DISCHARGE CAREGIVER/SUPPORT SYSTEM     Identified post-op caregiver: Patient has spouse / significant other.  Patient's caregiver(s) will be able to provide physical assistance. Patient will have someone to assist overnight.      Caregiver present at pre-op interview:  No      PRE-OPERATIVE FUNCTIONAL STATUS     Employment: Retired    Pre-op Functional Status: Patient is independent with mobility/ambulation, transfers, ADL's, IADL's.    Use of assistive device for ambulation: none  ADL: self care  ADL Limitations: difficulty with walking  Medical Restrictions: Unstable ambulation and Decreased range of motions in extremities    POTENTIAL BARRIERS TO DISCHARGE/POTENTIAL POST-OP COMPLICATIONS     Patient with hx of HTN, vertigo. POSSIBLE SAME DAY DISCHARGE.    DISCHARGE PLAN     Expected LOS of 1 days or less for joint replacement discussed with patient. We also discussed a discharge path of HH for approximately two weeks with a transition to outpatient PT on the third week given no post-op complications.      Patient in agreement with discharge plan: Yes    Discharge to: Discharge home with home health (PT/OT) x2 weeks with transition to outpatient PT     HH:  [facility] ([place], [place]).  Patient disclosure form completed and sent to case management for upload to the medical record.      OP PT: [facility] ([place], [place]).     Home DME: rolling walker    Needed DME at D/C: none. Patient to use sink to assist with rising  from toilet. Shower has a seat and rails built in.     Rx: Per Dr. Steel at discharge     Meds to Beds: Yes  Patient expected to discharge on Aspirin 325 mg by mouth twice daily for DVT prophylaxis.

## 2025-06-27 NOTE — PRE ADMISSION SCREENING
"               [facility] Risk Assessment Scale    Patient Name: Lucas Mayer  YOB: 1955  MRN: [id number]            RIsk Factor Measure Recommendation Patient Data Scale/Score   BMI >40 Reconsider surgery, weight loss   Estimated body mass index is 36.29 kg/m² as calculated from the following:    Height as of 6/23/25: 5' 2" (1.575 m).    Weight as of 6/23/25: 90 kg (198 lb 6.6 oz).   [] 0 = 1 - 24.9  [] 1 = 25-29.9  [] 2 = 30-34.9  [x] 3 = 35-39.9  [] 4 = 40-44.9  [] 5 = 45-99.9   Hemoglobin AIC (if applicable) >9 Delay surgery until DM under control  Refer for:  Nutrition Therapy  Exercise   Medication    Lab Results   Component Value Date    HGBA1C 5.3 01/24/2025       Lab Results   Component Value Date    GLU 94 01/24/2025      [x] 0 = 4.0-5.6  [] 1 = 5.7-6.4  [] 2 = 6.5-6.9  [] 3 = 7.0-7.9  [] 4 = 8.0-8.9  [] 5 = 9.0-12   Hemoglobin (Anemia) <9 Delay surgery   Correct anemia Lab Results   Component Value Date    HGB 13.7 01/24/2025    [] 20 - <9.0                    Albumin <3 Delay surgery &Workup Lab Results   Component Value Date    ALBUMIN 3.5 01/24/2025    [] 20 - <3.0   Smoking Cessation >4 Weeks Delay Surgery  Refer to OP Cessation Class    Never Smoker [] 20 - current smoker                                _____ PPD                    Hx of MI, PE, Arrhythmia, CVA, DVT <30 Days Delay Surgery    N/A [] 20      Infection Variable Delay surgery and re-evaluate   N/A [] 20 - recent/current infection     Depression (PHQ) >10 out of 27 Delay Surgery and re-evaluate  Medication  Counseling              [x] 0     []1     []2     []3      []4      [] 5                    (1-4)      (5-9)  (10-14)  (15-19)   (20-27)     Memory Impairment & Memory loss (Mini-Cog Screening Tool) Advanced dementia and/or Parkinson's Reconsider surgery     [x] 0     []1     []2     []3     []4     [] 5     Physical Conditioning (Modified AM-PAC Per Physical Therapy at [facility]) Unable to ambulate on day of surgery " Delay surgery and re-evaluate  Pre-Rehabilitation   (PT evaluation)       [x]  0   []4       []8     []12        []16     []20       (<20%)   (<40%)   (<60%)   (<80% )    (>80%)     Home Environment/Caregiver support  (Per /Navigator Interview)    Availability of basic services and/or approprate assistance during post-operative period Delay surgery and re-evaluate  Safe home environment  Health   1 week post-surgery  Transportation  availability  Ability to obtain DME/Medications post-op    [x] 0     []1     []2     []3     []4     [] 5  [x] 0     []1     []2     []3     []4     [] 5  [x] 0     []1     []2     []3     []4     [] 5  [x] 0     []1     []2     []3     []4     [] 5         MD Contact: Dr. Yusuf Comments:  Total Score:  3

## 2025-06-27 NOTE — PRE ADMISSION SCREENING
Patient Name: Lucas Mayer  YOB: 1955   MRN: [id number]     [facility]   Basic Mobility Inpatient Short Form 6 Clicks         How much difficulty does the patient currently have  Unable  A Lot  A Little  None      1. Turning over in bed (including adjusting bedclothes, sheets and blankets)?     1 []    2 []    3 [x]    4 []        2. Sitting down on and standing up from a chair with arms (e.g., wheelchair, bedside commode, etc.)     1 []  2 []  3 []     4 [x]      3. Moving from lying on back to sitting on the side of the bed?     1 []  2 []  3 []    4 [x]    How much help from another person does the patient currently need  Total  A Lot  A Little  None      4. Moving to and from a bed to a chair (including a wheelchair)?    1 []  2 []  3 []    4 [x]      5. Need to walk in hospital room?    1 []  2 []  3 []    4 [x]      6. Climbing 3-5 steps with a railing?    1 []  2 []  3 []    4 [x]       Raw Score:      23            CMS 0-100% Score:   11.20         %   Standardized Score:    56.93           CMS Modifier:        CI                                  [facility]   Basic Mobility Inpatient Short Form 6 Clicks Score Conversion Table*         *Use this form to convert -PAC Basic Mobility Inpatient Raw Scores.   [facility] Inpatient Basic Mobility Short Form Scoring Example   1. Add the number values associated with the response to each item. For example, items totals yield a Raw Score of 21.   2. Match the raw score to the t-Scale scores (t-Scale score = 50.25, SE = 4.69).   3. Find the associated CMS % (CMS % = 28.97%).   4. Locate the correct CMS Functional Modifier Code, or G Code (G code = CJ)     NOTE: Each -PAC Short Form has a separate conversion table. Make sure that you use the correct conversion table.       Instruction Manual - page 45 contains conversion table

## 2025-07-08 ENCOUNTER — HOSPITAL ENCOUNTER (OUTPATIENT)
Facility: HOSPITAL | Age: 70
Discharge: HOME | End: 2025-07-08
Attending: ORTHOPAEDIC SURGERY | Admitting: ORTHOPAEDIC SURGERY
Payer: MEDICARE

## 2025-07-08 ENCOUNTER — TELEPHONE (OUTPATIENT)
Dept: ORTHOPEDICS | Facility: CLINIC | Age: 70
End: 2025-07-08
Payer: MEDICARE

## 2025-07-08 ENCOUNTER — ANESTHESIA (OUTPATIENT)
Dept: SURGERY | Facility: HOSPITAL | Age: 70
End: 2025-07-08
Payer: MEDICARE

## 2025-07-08 DIAGNOSIS — Z01.818 PRE-OP TESTING: ICD-10-CM

## 2025-07-08 DIAGNOSIS — M17.0 PRIMARY OSTEOARTHRITIS OF BOTH KNEES: ICD-10-CM

## 2025-07-08 PROCEDURE — C1713 ANCHOR/SCREW BN/BN,TIS/BN: HCPCS | Performed by: ORTHOPAEDIC SURGERY

## 2025-07-08 PROCEDURE — 25000003 PHARM REV CODE 250: Performed by: ORTHOPAEDIC SURGERY

## 2025-07-08 PROCEDURE — 71000039 HC RECOVERY, EACH ADD'L HOUR: Performed by: ORTHOPAEDIC SURGERY

## 2025-07-08 PROCEDURE — 63600175 PHARM REV CODE 636 W HCPCS: Performed by: ORTHOPAEDIC SURGERY

## 2025-07-08 PROCEDURE — 27447 TOTAL KNEE ARTHROPLASTY: CPT | Mod: LT,,, | Performed by: ORTHOPAEDIC SURGERY

## 2025-07-08 PROCEDURE — 64447 NJX AA&/STRD FEMORAL NRV IMG: CPT | Performed by: SPECIALIST

## 2025-07-08 PROCEDURE — 27201423 OPTIME MED/SURG SUP & DEVICES STERILE SUPPLY: Performed by: ORTHOPAEDIC SURGERY

## 2025-07-08 PROCEDURE — 36000710: Performed by: ORTHOPAEDIC SURGERY

## 2025-07-08 PROCEDURE — 25000003 PHARM REV CODE 250: Performed by: SPECIALIST

## 2025-07-08 PROCEDURE — 71000033 HC RECOVERY, INTIAL HOUR: Performed by: ORTHOPAEDIC SURGERY

## 2025-07-08 PROCEDURE — 37000009 HC ANESTHESIA EA ADD 15 MINS: Performed by: ORTHOPAEDIC SURGERY

## 2025-07-08 PROCEDURE — 37000008 HC ANESTHESIA 1ST 15 MINUTES: Performed by: ORTHOPAEDIC SURGERY

## 2025-07-08 PROCEDURE — 71000015 HC POSTOP RECOV 1ST HR: Performed by: ORTHOPAEDIC SURGERY

## 2025-07-08 PROCEDURE — C1776 JOINT DEVICE (IMPLANTABLE): HCPCS | Performed by: ORTHOPAEDIC SURGERY

## 2025-07-08 PROCEDURE — 36000711: Performed by: ORTHOPAEDIC SURGERY

## 2025-07-08 PROCEDURE — 63600175 PHARM REV CODE 636 W HCPCS: Performed by: SPECIALIST

## 2025-07-08 PROCEDURE — 25000003 PHARM REV CODE 250: Performed by: NURSE ANESTHETIST, CERTIFIED REGISTERED

## 2025-07-08 PROCEDURE — 97161 PT EVAL LOW COMPLEX 20 MIN: CPT

## 2025-07-08 PROCEDURE — 63600175 PHARM REV CODE 636 W HCPCS: Performed by: NURSE ANESTHETIST, CERTIFIED REGISTERED

## 2025-07-08 DEVICE — IMPLANTABLE DEVICE: Type: IMPLANTABLE DEVICE | Site: KNEE | Status: FUNCTIONAL

## 2025-07-08 DEVICE — CEMENT BONE W/GENT SIMPLEX HV: Type: IMPLANTABLE DEVICE | Site: KNEE | Status: FUNCTIONAL

## 2025-07-08 DEVICE — TIBIAL TRAY CRUCIATE 67MM: Type: IMPLANTABLE DEVICE | Site: KNEE | Status: FUNCTIONAL

## 2025-07-08 RX ORDER — HYDROMORPHONE HYDROCHLORIDE 2 MG/ML
0.5 INJECTION, SOLUTION INTRAMUSCULAR; INTRAVENOUS; SUBCUTANEOUS EVERY 5 MIN PRN
Status: DISCONTINUED | OUTPATIENT
Start: 2025-07-08 | End: 2025-07-08 | Stop reason: HOSPADM

## 2025-07-08 RX ORDER — DOCUSATE SODIUM 100 MG/1
100 CAPSULE, LIQUID FILLED ORAL EVERY 12 HOURS
Status: CANCELLED | OUTPATIENT
Start: 2025-07-08

## 2025-07-08 RX ORDER — PROPOFOL 10 MG/ML
VIAL (ML) INTRAVENOUS CONTINUOUS PRN
Status: DISCONTINUED | OUTPATIENT
Start: 2025-07-08 | End: 2025-07-08

## 2025-07-08 RX ORDER — TRANEXAMIC ACID 1 G/10ML
INJECTION, SOLUTION INTRAVENOUS
Status: DISCONTINUED | OUTPATIENT
Start: 2025-07-08 | End: 2025-07-08

## 2025-07-08 RX ORDER — OXYCODONE HYDROCHLORIDE 5 MG/1
5 TABLET ORAL ONCE AS NEEDED
Status: COMPLETED | OUTPATIENT
Start: 2025-07-08 | End: 2025-07-08

## 2025-07-08 RX ORDER — MIDAZOLAM HYDROCHLORIDE 1 MG/ML
INJECTION INTRAMUSCULAR; INTRAVENOUS
Status: DISCONTINUED | OUTPATIENT
Start: 2025-07-08 | End: 2025-07-08

## 2025-07-08 RX ORDER — SODIUM CHLORIDE 9 MG/ML
INJECTION, SOLUTION INTRAVENOUS CONTINUOUS
Status: CANCELLED | OUTPATIENT
Start: 2025-07-08

## 2025-07-08 RX ORDER — MUPIROCIN 20 MG/G
OINTMENT TOPICAL
Status: DISCONTINUED | OUTPATIENT
Start: 2025-07-08 | End: 2025-07-08 | Stop reason: HOSPADM

## 2025-07-08 RX ORDER — SODIUM CHLORIDE, SODIUM LACTATE, POTASSIUM CHLORIDE, CALCIUM CHLORIDE 600; 310; 30; 20 MG/100ML; MG/100ML; MG/100ML; MG/100ML
INJECTION, SOLUTION INTRAVENOUS CONTINUOUS
Status: DISCONTINUED | OUTPATIENT
Start: 2025-07-08 | End: 2025-07-08 | Stop reason: HOSPADM

## 2025-07-08 RX ORDER — PROPOFOL 10 MG/ML
VIAL (ML) INTRAVENOUS
Status: DISCONTINUED | OUTPATIENT
Start: 2025-07-08 | End: 2025-07-08

## 2025-07-08 RX ORDER — ACETAMINOPHEN 10 MG/ML
INJECTION, SOLUTION INTRAVENOUS
Status: DISCONTINUED | OUTPATIENT
Start: 2025-07-08 | End: 2025-07-08

## 2025-07-08 RX ORDER — ONDANSETRON HYDROCHLORIDE 2 MG/ML
4 INJECTION, SOLUTION INTRAVENOUS DAILY PRN
Status: DISCONTINUED | OUTPATIENT
Start: 2025-07-08 | End: 2025-07-08 | Stop reason: HOSPADM

## 2025-07-08 RX ORDER — HYDROCODONE BITARTRATE AND ACETAMINOPHEN 5; 325 MG/1; MG/1
1 TABLET ORAL EVERY 6 HOURS PRN
Qty: 30 TABLET | Refills: 0 | Status: SHIPPED | OUTPATIENT
Start: 2025-07-08

## 2025-07-08 RX ORDER — ONDANSETRON 4 MG/1
8 TABLET, ORALLY DISINTEGRATING ORAL EVERY 8 HOURS PRN
Status: CANCELLED | OUTPATIENT
Start: 2025-07-08

## 2025-07-08 RX ORDER — OXYCODONE HYDROCHLORIDE 5 MG/1
10 TABLET ORAL EVERY 4 HOURS PRN
Refills: 0 | Status: CANCELLED | OUTPATIENT
Start: 2025-07-08

## 2025-07-08 RX ORDER — MORPHINE SULFATE 2 MG/ML
2 INJECTION, SOLUTION INTRAMUSCULAR; INTRAVENOUS EVERY 4 HOURS PRN
Refills: 0 | Status: CANCELLED | OUTPATIENT
Start: 2025-07-08

## 2025-07-08 RX ORDER — ONDANSETRON HYDROCHLORIDE 2 MG/ML
4 INJECTION, SOLUTION INTRAVENOUS EVERY 12 HOURS PRN
Status: CANCELLED | OUTPATIENT
Start: 2025-07-08

## 2025-07-08 RX ORDER — EPHEDRINE SULFATE 50 MG/ML
INJECTION, SOLUTION INTRAVENOUS
Status: DISCONTINUED | OUTPATIENT
Start: 2025-07-08 | End: 2025-07-08

## 2025-07-08 RX ORDER — ROPIVACAINE HYDROCHLORIDE 5 MG/ML
INJECTION, SOLUTION EPIDURAL; INFILTRATION; PERINEURAL
Status: COMPLETED | OUTPATIENT
Start: 2025-07-08 | End: 2025-07-08

## 2025-07-08 RX ORDER — SODIUM CHLORIDE, SODIUM LACTATE, POTASSIUM CHLORIDE, CALCIUM CHLORIDE 600; 310; 30; 20 MG/100ML; MG/100ML; MG/100ML; MG/100ML
125 INJECTION, SOLUTION INTRAVENOUS CONTINUOUS
Status: DISCONTINUED | OUTPATIENT
Start: 2025-07-08 | End: 2025-07-08 | Stop reason: HOSPADM

## 2025-07-08 RX ORDER — LIDOCAINE HYDROCHLORIDE 20 MG/ML
INJECTION INTRAVENOUS
Status: DISCONTINUED | OUTPATIENT
Start: 2025-07-08 | End: 2025-07-08

## 2025-07-08 RX ORDER — FAMOTIDINE 20 MG/1
20 TABLET, FILM COATED ORAL 2 TIMES DAILY
Status: CANCELLED | OUTPATIENT
Start: 2025-07-08

## 2025-07-08 RX ORDER — LIDOCAINE HYDROCHLORIDE 10 MG/ML
1 INJECTION, SOLUTION EPIDURAL; INFILTRATION; INTRACAUDAL; PERINEURAL ONCE
Status: DISCONTINUED | OUTPATIENT
Start: 2025-07-08 | End: 2025-07-08 | Stop reason: HOSPADM

## 2025-07-08 RX ORDER — ENOXAPARIN SODIUM 100 MG/ML
40 INJECTION SUBCUTANEOUS
Status: DISCONTINUED | OUTPATIENT
Start: 2025-07-08 | End: 2025-07-08 | Stop reason: HOSPADM

## 2025-07-08 RX ORDER — BUPIVACAINE HYDROCHLORIDE 7.5 MG/ML
INJECTION, SOLUTION EPIDURAL; RETROBULBAR
Status: COMPLETED | OUTPATIENT
Start: 2025-07-08 | End: 2025-07-08

## 2025-07-08 RX ORDER — GENTAMICIN 40 MG/ML
INJECTION, SOLUTION INTRAMUSCULAR; INTRAVENOUS
Status: DISCONTINUED | OUTPATIENT
Start: 2025-07-08 | End: 2025-07-08 | Stop reason: HOSPADM

## 2025-07-08 RX ORDER — MUPIROCIN 20 MG/G
1 OINTMENT TOPICAL 2 TIMES DAILY
Status: CANCELLED | OUTPATIENT
Start: 2025-07-08 | End: 2025-07-13

## 2025-07-08 RX ORDER — GLUCAGON 1 MG
1 KIT INJECTION
Status: DISCONTINUED | OUTPATIENT
Start: 2025-07-08 | End: 2025-07-08 | Stop reason: HOSPADM

## 2025-07-08 RX ADMIN — VANCOMYCIN HYDROCHLORIDE 1500 MG: 1.5 INJECTION, POWDER, LYOPHILIZED, FOR SOLUTION INTRAVENOUS at 09:07

## 2025-07-08 RX ADMIN — OXYCODONE HYDROCHLORIDE 5 MG: 5 TABLET ORAL at 12:07

## 2025-07-08 RX ADMIN — TRANEXAMIC ACID 1000 MG: 100 INJECTION, SOLUTION INTRAVENOUS at 09:07

## 2025-07-08 RX ADMIN — BUPIVACAINE HYDROCHLORIDE 1.4 ML: 7.5 INJECTION, SOLUTION EPIDURAL; RETROBULBAR at 09:07

## 2025-07-08 RX ADMIN — LIDOCAINE HYDROCHLORIDE 50 MG: 20 INJECTION, SOLUTION INTRAVENOUS at 09:07

## 2025-07-08 RX ADMIN — ACETAMINOPHEN 1000 MG: 10 INJECTION INTRAVENOUS at 10:07

## 2025-07-08 RX ADMIN — PROPOFOL 50 MG: 10 INJECTION, EMULSION INTRAVENOUS at 09:07

## 2025-07-08 RX ADMIN — MIDAZOLAM HYDROCHLORIDE 2 MG: 1 INJECTION, SOLUTION INTRAMUSCULAR; INTRAVENOUS at 09:07

## 2025-07-08 RX ADMIN — ROPIVACAINE HYDROCHLORIDE 20 ML: 5 INJECTION, SOLUTION EPIDURAL; INFILTRATION; PERINEURAL at 09:07

## 2025-07-08 RX ADMIN — PROPOFOL 125 MCG/KG/MIN: 10 INJECTION, EMULSION INTRAVENOUS at 09:07

## 2025-07-08 RX ADMIN — EPHEDRINE SULFATE 15 MG: 50 INJECTION INTRAVENOUS at 10:07

## 2025-07-08 RX ADMIN — SODIUM CHLORIDE, POTASSIUM CHLORIDE, SODIUM LACTATE AND CALCIUM CHLORIDE: 600; 310; 30; 20 INJECTION, SOLUTION INTRAVENOUS at 08:07

## 2025-07-08 RX ADMIN — MUPIROCIN: 20 OINTMENT TOPICAL at 08:07

## 2025-07-08 NOTE — ANESTHESIA POSTPROCEDURE EVALUATION
Anesthesia Post Evaluation    Patient: Lucas Mayer    Procedure(s) Performed: Procedure(s) (LRB):  ARTHROPLASTY, KNEE (Left)    Final Anesthesia Type: spinal      Patient location during evaluation: PACU  Patient participation: Yes- Able to Participate  Level of consciousness: awake and alert and oriented  Post-procedure vital signs: reviewed and stable  Pain management: adequate  Airway patency: patent    PONV status at discharge: No PONV  Anesthetic complications: no      Cardiovascular status: blood pressure returned to baseline and hemodynamically stable  Respiratory status: spontaneous ventilation and room air  Hydration status: euvolemic  Follow-up not needed.            Vitals Value Taken Time   /61 07/08/25 11:26   Temp 98 07/08/25 11:27   Pulse 58 07/08/25 11:26   Resp 10 07/08/25 11:26   SpO2 96 % 07/08/25 11:26   Vitals shown include unfiled device data.      No case tracking events are documented in the log.      Pain/Mouna Score: No data recorded        
right wrist pain

## 2025-07-08 NOTE — DISCHARGE SUMMARY
[facility] Surgery  Discharge Note  Short Stay    Procedure(s) (LRB):  ARTHROPLASTY, KNEE (Left)      OUTCOME: Patient tolerated treatment/procedure well without complication and is now ready for discharge.    DISPOSITION: Home or Self Care    FINAL DIAGNOSIS:  Left knee osteoarthritis    FOLLOWUP: In clinic    DISCHARGE INSTRUCTIONS:    Discharge Procedure Orders   Diet general     Change dressing (specify)   Order Comments: Dressing change:  In 4-5 days or as needed for saturated bandage with the Aquacel bandage provided     Call MD for:  temperature >100.4     Call MD for:  persistent nausea and vomiting     Call MD for:  severe uncontrolled pain     Call MD for:  difficulty breathing, headache or visual disturbances     Call MD for:  redness, tenderness, or signs of infection (pain, swelling, redness, odor or green/yellow discharge around incision site)     Call MD for:  hives     Call MD for:  persistent dizziness or light-headedness     Call MD for:  extreme fatigue        TIME SPENT ON DISCHARGE: 15 minutes

## 2025-07-08 NOTE — ANESTHESIA PROCEDURE NOTES
Spinal    Diagnosis: DJD  Patient location during procedure: pre-op  Start time: 7/8/2025 9:17 AM  Timeout: 7/8/2025 9:16 AM  End time: 7/8/2025 9:20 AM    Staffing  Authorizing Provider: Srini Wilson MD  Performing Provider: Srini Wilson MD    Staffing  Performed by: Srini Wilson MD  Authorized by: Srini Wilson MD    Preanesthetic Checklist  Completed: patient identified, IV checked, site marked, risks and benefits discussed, surgical consent, monitors and equipment checked, pre-op evaluation and timeout performed  Spinal Block  Patient position: sitting  Prep: Betadine  Patient monitoring: heart rate, cardiac monitor, continuous pulse ox, continuous capnometry and frequent blood pressure checks  Approach: midline  Location: L3-4  Injection technique: single shot  CSF Fluid: clear free-flowing CSF  Needle  Needle type: Quincke   Needle gauge: 25 G  Needle length: 3.5 in  Additional Documentation: incremental injection, negative aspiration for heme and no paresthesia on injection  Needle localization: anatomical landmarks  Assessment  Sensory level: T10   Dermatomal levels determined by pinch or prick  Ease of block: easy  Patient's tolerance of the procedure: comfortable throughout block and no complaints  Additional Notes  SAB performed at L3-4 without any problems.  Medications:    Medications: BUPivacaine (PF) 0.75% (7.5 mg/ml) injection - Intraspinal   1.4 mL - 7/8/2025 9:19:00 AM

## 2025-07-08 NOTE — PT/OT/SLP EVAL
Physical Therapy Evaluation    Patient Name:  Lucas Mayer   MRN:  [id number]    Recommendations:     Discharge Recommendations: Moderate Intensity Therapy (Patient to begin home health therapy tomorrow.)   Discharge Equipment Recommendations: walker, rolling, other (see comments)   Barriers to discharge: None    Assessment:     Lucas Mayer is a 70 y.o. female admitted with a medical diagnosis of <principal problem not specified>.  She presents with the following impairments/functional limitations: gait instability.    Rehab Prognosis: Good; patient would benefit from acute skilled PT services to address these deficits and reach maximum level of function.    Recent Surgery: Procedure(s) (LRB):  ARTHROPLASTY, KNEE (Left) * Day of Surgery *    Plan:     During this hospitalization, patient to be seen  (Evaluation only) to address the identified rehab impairments via   and progress toward the following goals:    Plan of Care Expires:  07/08/25    Subjective     Chief Complaint: The patient reports she feels weak in the right leg.  Patient/Family Comments/goals: The patient would like to return to independent mobility.  Pain/Comfort:  Pain Rating 1: 0/10    Patients cultural, spiritual, Cheondoism conflicts given the current situation: no    Living Environment:  The patient lives alone in a single story residence with 1 step to enter the residence.    Prior to admission, patients level of function was independent.  Equipment used at home: none.  DME owned (not currently used): none.  Upon discharge, patient will have assistance from her boyfriend.    Objective:     Communicated with nurse prior to session.  Patient found supine with telemetry, blood pressure cuff, pulse ox (continuous)  upon PT entry to room.    General Precautions: Standard, fall  Orthopedic Precautions:LLE weight bearing as tolerated   Braces: N/A  Respiratory Status: Room air    Exams:  Cognitive Exam:  Patient is oriented to Person,  Place, Time, and Situation  Gross Motor Coordination:  WFL  Sensation:    -       Intact  RUE ROM: WNL  RUE Strength: WNL  LUE ROM: WNL  LUE Strength: WNL  RLE ROM: WNL  RLE Strength: WNL  LLE ROM: Deficits: impaired knee ROM  LLE Strength: Deficits: 3+/5    Functional Mobility:  Bed Mobility:     Supine to Sit: independence  Sit to Supine: independence  Transfers:     Sit to Stand:  independence with rolling walker  Gait: Ambulates with a rolling walker WBAT left lower extremity      AM-PAC 6 CLICK MOBILITY  Total Score:21       Treatment & Education:  The patient was instructed and performed ankle pumps and quad sets.  She was educated on the use of cold packs to control pain and swelling.  The patient received gait training with the rolling walker WBAT left lower extremity.    Patient left supine with nurse notified and boyfriend present.    GOALS:   Multidisciplinary Problems       Physical Therapy Goals       Not on file              Multidisciplinary Problems (Resolved)          Problem: Physical Therapy    Goal Priority Disciplines Outcome Interventions   Physical Therapy Goal   (Resolved)     PT, PT/OT Met           Problem: Physical Therapy    Goal Priority Disciplines Outcome Interventions   Physical Therapy Goal   (Resolved)     PT, PT/OT Met    Description: Goals    1.  Patient to perform quad sets and ankle pumps.  2.  Patient to understand use of cold packs for control of pain and swelling.  3.  Patient to ambulate with a rolling walker WBAT left knee.                       DME Justifications:  No DME recommended requiring DME justifications    History:     Past Medical History:   Diagnosis Date    Cataract     Depression     Hypertension     Incontinence of urine     Nausea in adult     nausea to certain scents and with motion    Other and unspecified hyperlipidemia     Post-operative nausea and vomiting     Sleep apnea, unspecified     wears CPAP HS    Stool incontinence     Tremor of both hands      Trichiasis     Vertigo        Past Surgical History:   Procedure Laterality Date    BLEPHAROPLASTY, UPPER EYELID      CATARACT EXTRACTION W/  INTRAOCULAR LENS IMPLANT Left 12/03/2018    Dr Foreman     CATARACT EXTRACTION W/  INTRAOCULAR LENS IMPLANT Left 12/03/2018    Procedure: EXTRACTION, CATARACT, WITH IOL INSERTION;  Surgeon: Emilie Foreman MD;  Location: [facility] [place];  Service: Ophthalmology;  Laterality: Left;    CATARACT EXTRACTION W/  INTRAOCULAR LENS IMPLANT Right 02/04/2019    Procedure: EXTRACTION, CATARACT, WITH IOL INSERTION;  Surgeon: Emilie Foreman MD;  Location: [facility];  Service: Ophthalmology;  Laterality: Right;    COLONOSCOPY, SCREENING, HIGH RISK PATIENT N/A 12/19/2024    Procedure: COLONOSCOPY, SCREENING, HIGH RISK PATIENT;  Surgeon: Hernandez Cordero MD;  Location: [facility];  Service: Endoscopy;  Laterality: N/A;    EYE SURGERY Bilateral     bilateral    HYSTERECTOMY  08/2001    fibroids- BK/BSO    OOPHORECTOMY      TONSILLECTOMY      WISDOM TOOTH EXTRACTION         Time Tracking:     PT Received On: 07/08/25  PT Start Time: 1230     PT Stop Time: 1245  PT Total Time (min): 15 min     Billable Minutes: Evaluation 15      07/08/2025

## 2025-07-08 NOTE — OP NOTE
[facility]  Orthopedic Surgery  Operative Note    SUMMARY     Date of Procedure: 7/8/2025     Procedure: Procedure(s) (LRB):  ARTHROPLASTY, KNEE (Left)       Surgeons and Role:     * Homer Steel MD - Primary    Assisting Surgeon: None    Pre-Operative Diagnosis: Primary osteoarthritis of both knees [M17.0]  Pre-op testing [Z01.818]    Post-Operative Diagnosis: Post-Op Diagnosis Codes:     * Primary osteoarthritis of both knees [M17.0]     * Pre-op testing [Z01.818]    Anesthesia: Spinal    Significant Surgical Tasks Conducted by the Assistant(s), if Applicable:  Patient positioning prepping and draping wound exposure facilitation of implantation of components removal of excess cement final wound closure and application of postoperative bandages    Complications: None.    Estimated Blood Loss (EBL): * No values recorded between 7/8/2025  9:16 AM and 7/8/2025 11:08 AM *           Implants:   Implant Name Type Inv. Item Serial No.  Lot No. LRB No. Used Action   CEMENT BONE W/GENT SIMPLEX HV - [id number]  CEMENT BONE W/GENT SIMPLEX HV  [employer]. [id number] Left 1 Implanted   COMPONENT VANGUARD L 65MM - [id number]  COMPONENT VANGUARD L 65MM  [employer] [id number] Left 1 Implanted   TIBIAL TRAY CRUCIATE 67MM - [id number]  TIBIAL TRAY CRUCIATE 67MM  [employer]  Left 1 Implanted   CMPNT PTLR 8MM 28MM 3 PG WRE - [id number]  CMPNT PTLR 8MM 28MM 3 PG WRE  [employer]  Left 1 Implanted   VANGUARD KNEE TIBIAL BEARING 67MM WIDTH 12MM THICKNESS     [id number] Left 1 Implanted       Specimens:   Specimen (24h ago, onward)      None               Description of Procedure:  After informed consent was obtained correctly identifying the patient she was taken to the operating room and after adequate anesthesia was prepped and draped in usual standard fashion.  The left extremity was elevated exsanguinated tourniquet inflated to 250 mmHg.  Longitudinal incision was made over the knee standard  parapatellar median incision was made.  The patella was everted.  Hyperflexed the knee exposed distal tibia placed a canal anuj after drilling the central portion of the canal placed an alignment anuj made a conservative 10 mm cut on the distal femur.  This was after placing my cutting guide.  I then placed a 4 in 1 cutting guide and made anterior-posterior cuts and the chamfer cuts under direct visualization protecting all the collaterals in the skin.  At this point I exposed the tibia using PCL retractor and lateral retractors made a conservative cut using an alignment anuj taking just a mm or 2 off the most affected medial side.  After this was accomplished checked it with a 10 mm spacer to ensure adequate bony cuts.  At this point I sized the tibia drilled and punched an appropriate size tibia.  I placed my trial femur along with my trial tibial base plate and a 12 mm spacer and this gave nice stability range of motion equal to equal flexion and extension gaps.  At this point with the knee everted and placing 2 penetrating towel clips I exposed the patella made a freehand saw cut sized and punched the patella.  This point I moved my removed my trials irrigated the incision in the bone prepared for cement.  After drying the cement I placed a bit of cement on the tibial base plate and on the tibia tapped the tibial base plate into place.  In similar fashion placed some bit of cement on the posterior portion of the femoral implant and along the bony surfaces impacted the femur into position and then used a or 12 mm spacer removed excess cement gently irrigated this and cemented my patella and placed and held with a clamp.  After removing all excess cement I placed several lap sponges placed an Esmarch and let the tourniquet down.  After the cement had cured I removed the Esmarch irrigated the incision obtained hemostasis with minimal use of bipolar electrocautery.  Took the knee through a range of motion and the  patella had nice patellar tracking there was equal flexion-extension gaps and nice stable knee with excellent alignment of the components.  At this point switched out the trial component for our insert and locked it in place with the clip.  Once again took the knee through a range of motion.  At this point after  final irrigation I closed the arthrotomy with #2 Ethibond.  Closed the subcutaneous tissue with 0 Vicryl 2-0 Vicryl followed by a running 3-0 Monocryl.  This was followed by Xeroform dressing sponges Webril and a lightly compressive Ace wrap.  The patient tolerated the procedure well was taken to the recovery room in stable condition with brisk capillary refill of digits intact dorsalis pedis and posterior tibial pulses.

## 2025-07-08 NOTE — TELEPHONE ENCOUNTER
----- Message from Med Assistant Archuleta sent at 7/8/2025  2:31 PM CDT -----  Contact:    max  When to start asprin ?  Call back

## 2025-07-08 NOTE — PLAN OF CARE
Problem: Physical Therapy  Goal: Physical Therapy Goal  Outcome: Met     Problem: Physical Therapy  Goal: Physical Therapy Goal  Description: Goals    1.  Patient to perform quad sets and ankle pumps.  2.  Patient to understand use of cold packs for control of pain and swelling.  3.  Patient to ambulate with a rolling walker WBAT left knee.  Outcome: Met

## 2025-07-08 NOTE — PLAN OF CARE
Patient and significant other both verbalized understanding of discharge instructions. Both aware of when last pain medication was given. Both aware and understand dressing wound care instructions.

## 2025-07-08 NOTE — TELEPHONE ENCOUNTER
----- Message from Jose España sent at 7/8/2025  2:49 PM CDT -----  Contact: Ania/[facility]  Insisting on another message being sent on attached patient.  Pt had surgery and is being discharged home.  Pt is supposed to be seen tomorrow for admission to .  Can a PT be sent instead of a nurse?    Call back number is [phone]

## 2025-07-08 NOTE — TRANSFER OF CARE
"Anesthesia Transfer of Care Note    Patient: Lucas Mayer    Procedure(s) Performed: Procedure(s) (LRB):  ARTHROPLASTY, KNEE (Left)    Patient location: PACU    Anesthesia Type: spinal    Transport from OR: Transported from OR on room air with adequate spontaneous ventilation    Post pain: adequate analgesia    Post assessment: no apparent anesthetic complications    Post vital signs: stable    Level of consciousness: awake, alert and oriented    Nausea/Vomiting: no nausea/vomiting    Complications: none    Transfer of care protocol was followed      Last vitals: Visit Vitals  BP (!) 117/55   Pulse (!) 52   Temp 37.2 °C (98.9 °F) (Temporal)   Resp 16   Ht 5' 2" (1.575 m)   Wt 89.8 kg (198 lb)   SpO2 98%   Breastfeeding No   BMI 36.21 kg/m²     "

## 2025-07-08 NOTE — ANESTHESIA PROCEDURE NOTES
Peripheral Block    Patient location during procedure: pre-op   Block not for primary anesthetic.  Reason for block: at surgeon's request and post-op pain management   Post-op Pain Location: left knee   Start time: 7/8/2025 9:06 AM  Timeout: 7/8/2025 9:05 AM   End time: 7/8/2025 9:09 AM    Staffing  Authorizing Provider: Srini Wilson MD  Performing Provider: Srini Wilson MD    Staffing  Performed by: Srini Wilson MD  Authorized by: Srini Wilson MD    Preanesthetic Checklist  Completed: patient identified, IV checked, site marked, risks and benefits discussed, surgical consent, monitors and equipment checked, pre-op evaluation and timeout performed  Peripheral Block  Patient position: supine  Prep: ChloraPrep  Patient monitoring: heart rate, cardiac monitor, continuous pulse ox, continuous capnometry and frequent blood pressure checks  Block type: adductor canal  Laterality: left  Injection technique: single shot  Needle  Needle type: Stimuplex   Needle gauge: 22 G  Needle length: 3.5 in  Needle localization: ultrasound guidance     Assessment  Injection assessment: negative aspiration, negative parasthesia and local visualized surrounding nerve  Paresthesia pain: none  Heart rate change: no  Slow fractionated injection: yes  Pain Tolerance: comfortable throughout block and no complaints  Medications:    Medications: ropivacaine (NAROPIN) injection 0.5% - Perineural   20 mL - 7/8/2025 9:08:00 AM    Additional Notes  Versed 2mg IV for sedation. Block performed without any problems

## 2025-07-09 VITALS
OXYGEN SATURATION: 96 % | RESPIRATION RATE: 15 BRPM | HEIGHT: 62 IN | SYSTOLIC BLOOD PRESSURE: 119 MMHG | BODY MASS INDEX: 36.44 KG/M2 | HEART RATE: 54 BPM | DIASTOLIC BLOOD PRESSURE: 56 MMHG | WEIGHT: 198 LBS | TEMPERATURE: 97 F

## 2025-07-16 DIAGNOSIS — M17.0 PRIMARY OSTEOARTHRITIS OF BOTH KNEES: ICD-10-CM

## 2025-07-16 RX ORDER — HYDROCODONE BITARTRATE AND ACETAMINOPHEN 5; 325 MG/1; MG/1
1 TABLET ORAL EVERY 6 HOURS PRN
Qty: 30 TABLET | Refills: 0 | OUTPATIENT
Start: 2025-07-16

## 2025-07-17 RX ORDER — HYDROCODONE BITARTRATE AND ACETAMINOPHEN 5; 325 MG/1; MG/1
1 TABLET ORAL EVERY 8 HOURS PRN
Qty: 10 TABLET | Refills: 0 | Status: SHIPPED | OUTPATIENT
Start: 2025-07-17

## 2025-07-21 ENCOUNTER — OFFICE VISIT (OUTPATIENT)
Dept: ORTHOPEDICS | Facility: CLINIC | Age: 70
End: 2025-07-21
Payer: MEDICARE

## 2025-07-21 VITALS — HEIGHT: 62 IN | BODY MASS INDEX: 36.52 KG/M2 | WEIGHT: 198.44 LBS

## 2025-07-21 DIAGNOSIS — Z96.652 S/P TOTAL KNEE ARTHROPLASTY, LEFT: Primary | ICD-10-CM

## 2025-07-21 PROCEDURE — 99024 POSTOP FOLLOW-UP VISIT: CPT | Mod: S$GLB,,, | Performed by: ORTHOPAEDIC SURGERY

## 2025-07-21 PROCEDURE — 1159F MED LIST DOCD IN RCRD: CPT | Mod: CPTII,S$GLB,, | Performed by: ORTHOPAEDIC SURGERY

## 2025-07-21 PROCEDURE — 1160F RVW MEDS BY RX/DR IN RCRD: CPT | Mod: CPTII,S$GLB,, | Performed by: ORTHOPAEDIC SURGERY

## 2025-07-21 PROCEDURE — 99999 PR PBB SHADOW E&M-EST. PATIENT-LVL III: CPT | Mod: PBBFAC,,, | Performed by: ORTHOPAEDIC SURGERY

## 2025-07-21 PROCEDURE — 1100F PTFALLS ASSESS-DOCD GE2>/YR: CPT | Mod: CPTII,S$GLB,, | Performed by: ORTHOPAEDIC SURGERY

## 2025-07-21 PROCEDURE — 3044F HG A1C LEVEL LT 7.0%: CPT | Mod: CPTII,S$GLB,, | Performed by: ORTHOPAEDIC SURGERY

## 2025-07-21 PROCEDURE — 3288F FALL RISK ASSESSMENT DOCD: CPT | Mod: CPTII,S$GLB,, | Performed by: ORTHOPAEDIC SURGERY

## 2025-07-21 PROCEDURE — 1125F AMNT PAIN NOTED PAIN PRSNT: CPT | Mod: CPTII,S$GLB,, | Performed by: ORTHOPAEDIC SURGERY

## 2025-07-21 NOTE — PROGRESS NOTES
Subjective:      Patient ID: Lucas Mayer is a 70 y.o. female.    Chief Complaint: Pain and Post-op Evaluation of the Left Knee (DOS: 07/08/2025)    HPI  The patient is now 2 weeks out from left total knee replacement.  Pain is improving.  She is receiving physical therapy..  She states she has disregarded her support stockings because of the leg swelling.  ROS      Objective:    Ortho Exam     Moderately antalgic walker assisted gait  Nicely healing surgical incision  She has difficulty with full active extension in his about 85° of flexion    X-Ray Knee 1 or 2 View Left  EXAMINATION:  XR KNEE 1 OR 2 VIEW LEFT    CLINICAL HISTORY:  Two views left knee status post total knee replacement;    TECHNIQUE:  AP and lateral views of the left knee.    COMPARISON:  06/23/2025.    FINDINGS:  Status post total knee arthroplasty.  Normal alignment.  There is postsurgical soft tissue swelling and soft tissue emphysema.  Small suprapatellar effusion.    Electronically signed by: Gonzales Blas  Date:    07/08/2025  Time:    12:48       My Radiographs Findings:    No new radiographs  Assessment:       Encounter Diagnosis   Name Primary?    S/P total knee arthroplasty, left Yes         Plan:       I have discussed medical condition treatment options with her at length.  I have explained that the support stockings we will help reduced the swelling and prevent DVT.  We have discussed DVT prophylaxis including the use of the support stockings and aspirin twice daily as directed.  Continue with physical therapy for range of motion aggressively over the next 6 weeks follow up at that time for re-evaluation I will see her sooner if any questions or problems.        Past Medical History:   Diagnosis Date    Cataract     Depression     Hypertension     Incontinence of urine     Nausea in adult     nausea to certain scents and with motion    Other and unspecified hyperlipidemia     Post-operative nausea and vomiting     Sleep  apnea, unspecified     wears CPAP HS    Stool incontinence     Tremor of both hands     Trichiasis     Vertigo      Past Surgical History:   Procedure Laterality Date    BLEPHAROPLASTY, UPPER EYELID      CATARACT EXTRACTION W/  INTRAOCULAR LENS IMPLANT Left 12/03/2018    Dr Foreman     CATARACT EXTRACTION W/  INTRAOCULAR LENS IMPLANT Left 12/03/2018    Procedure: EXTRACTION, CATARACT, WITH IOL INSERTION;  Surgeon: Emilie Foreman MD;  Location: [facility];  Service: Ophthalmology;  Laterality: Left;    CATARACT EXTRACTION W/  INTRAOCULAR LENS IMPLANT Right 02/04/2019    Procedure: EXTRACTION, CATARACT, WITH IOL INSERTION;  Surgeon: Emilie Foreman MD;  Location: [facility];  Service: Ophthalmology;  Laterality: Right;    COLONOSCOPY, SCREENING, HIGH RISK PATIENT N/A 12/19/2024    Procedure: COLONOSCOPY, SCREENING, HIGH RISK PATIENT;  Surgeon: Hernandez Cordero MD;  Location: [facility];  Service: Endoscopy;  Laterality: N/A;    EYE SURGERY Bilateral     bilateral    HYSTERECTOMY  08/2001    fibroids- BK/BSO    KNEE ARTHROPLASTY Left 7/8/2025    Procedure: ARTHROPLASTY, KNEE;  Surgeon: Homer Steel MD;  Location: [facility];  Service: Orthopedics;  Laterality: Left;    OOPHORECTOMY      TONSILLECTOMY      WISDOM TOOTH EXTRACTION         Current Medications[1]    Review of patient's allergies indicates:   Allergen Reactions    Ancef [cefazolin] Anaphylaxis    Codeine Nausea And Vomiting    Pcn [penicillins] Rash       Family History   Problem Relation Name Age of Onset    Diabetes Mother      Stroke Mother      Essential Tremor Mother      Melanoma Father      Hypertension Father      Essential Tremor Sister      Essential Tremor Sister      Breast cancer Neg Hx      Cancer Neg Hx      Eclampsia Neg Hx       Social History     Occupational History    Not on file   Tobacco Use    Smoking status: Never     Passive exposure: Never    Smokeless tobacco: Never   Vaping Use    Vaping status: Never Used   Substance and Sexual  Activity    Alcohol use: Yes     Alcohol/week: 0.8 standard drinks of alcohol     Types: 1 Standard drinks or equivalent per week     Comment: Social    Drug use: No    Sexual activity: Not Currently     Partners: Male            [1]   Current Outpatient Medications:     amLODIPine (NORVASC) 5 MG tablet, TAKE 1 TABLET BY MOUTH ONCE  DAILY, Disp: 90 tablet, Rfl: 3    atorvastatin (LIPITOR) 40 MG tablet, Take 1 tablet (40 mg total) by mouth once daily., Disp: 90 tablet, Rfl: 3    calcium carb/vit D3/minerals (CALCIUM-VITAMIN D ORAL), Take 1 each by mouth once daily., Disp: , Rfl:     citalopram (CELEXA) 20 MG tablet, TAKE 1 TABLET BY MOUTH ONCE  DAILY, Disp: 90 tablet, Rfl: 3    clobetasoL (TEMOVATE) 0.05 % cream, Apply topically 2 (two) times daily. (Patient taking differently: Apply topically 2 (two) times daily as needed.), Disp: 60 g, Rfl: 1    conjugated estrogens (PREMARIN) vaginal cream, 1 g with applicator or dime-sized amt with finger in vagina nightly x 2 weeks, then twice a week thereafter (Patient taking differently: daily as needed. 1 g with applicator or dime-sized amt with finger in vagina nightly x 2 weeks, then twice a week thereafter), Disp: 45 g, Rfl: 12    diazePAM (VALIUM) 2 MG tablet, Take 1 tablet (2 mg total) by mouth every 8 (eight) hours as needed., Disp: 60 tablet, Rfl: 0    diphenoxylate-atropine 2.5-0.025 mg (LOMOTIL) 2.5-0.025 mg per tablet, Take 1 tablet by mouth 4 (four) times daily as needed for Diarrhea., Disp: 30 tablet, Rfl: 1    gabapentin (NEURONTIN) 300 MG capsule, TAKE 1 CAPSULE BY MOUTH IN THE  EVENING, Disp: 90 capsule, Rfl: 3    HYDROcodone-acetaminophen (NORCO) 5-325 mg per tablet, Take 1 tablet by mouth every 6 (six) hours as needed for Pain., Disp: 30 tablet, Rfl: 0    HYDROcodone-acetaminophen (NORCO) 5-325 mg per tablet, Take 1 tablet by mouth every 8 (eight) hours as needed for Pain., Disp: 10 tablet, Rfl: 0    vibegron 75 mg Tab, Take 1 tablet (75 mg total) by mouth  nightly., Disp: 30 tablet, Rfl: 11

## 2025-07-22 ENCOUNTER — CLINICAL SUPPORT (OUTPATIENT)
Dept: REHABILITATION | Facility: HOSPITAL | Age: 70
End: 2025-07-22
Attending: ORTHOPAEDIC SURGERY
Payer: MEDICARE

## 2025-07-22 DIAGNOSIS — F32.A DEPRESSION, UNSPECIFIED DEPRESSION TYPE: ICD-10-CM

## 2025-07-22 DIAGNOSIS — Z74.09 IMPAIRED FUNCTIONAL MOBILITY, BALANCE, GAIT, AND ENDURANCE: Primary | ICD-10-CM

## 2025-07-22 DIAGNOSIS — Z96.652 S/P TOTAL KNEE ARTHROPLASTY, LEFT: ICD-10-CM

## 2025-07-22 PROCEDURE — 97116 GAIT TRAINING THERAPY: CPT

## 2025-07-22 PROCEDURE — 97162 PT EVAL MOD COMPLEX 30 MIN: CPT

## 2025-07-22 PROCEDURE — 97110 THERAPEUTIC EXERCISES: CPT

## 2025-07-22 RX ORDER — DIAZEPAM 2 MG/1
2 TABLET ORAL EVERY 8 HOURS PRN
Qty: 60 TABLET | Refills: 0 | Status: SHIPPED | OUTPATIENT
Start: 2025-07-22

## 2025-07-22 NOTE — TELEPHONE ENCOUNTER
Refill Encounter    PCP Visits: Recent Visits  Date Type Provider Dept   03/28/25 Office Visit Jean Carlos Samuels MD [facility] Internal Medicine   Showing recent visits within past 360 days and meeting all other requirements  Future Appointments  No visits were found meeting these conditions.  Showing future appointments within next 720 days and meeting all other requirements      Last 3 Blood Pressure:   BP Readings from Last 3 Encounters:   07/08/25 (!) 119/56   06/27/25 (!) 149/66   05/14/25 136/71     Preferred Pharmacy:   [facility] - [place], [place] - [facility] [place]  [address]  [address]  [facility] [place] [address]  Phone: [phone] Fax: [phone]    [facility] - [place], [place] - [address]  [address]  [place] [address]  Phone: [phone] Fax: [phone]    [facility] - [facility] [address]  [address]  [address] [address]  Phone: [phone] Fax: [phone]    Requested RX:  Requested Prescriptions     Pending Prescriptions Disp Refills    diazePAM (VALIUM) 2 MG tablet [Pharmacy Med Name: diazepam 2 mg tablet] 60 tablet 0     Sig: TAKE ONE TABLET BY MOUTH EVERY EIGHT HOURS AS NEEDED      RX Route: Normal

## 2025-07-22 NOTE — TELEPHONE ENCOUNTER
No care due was identified.  [facility] Embedded Care Due Messages. Reference number: [id number].   7/22/2025 1:00:19 PM CDT

## 2025-07-22 NOTE — PROGRESS NOTES
Outpatient Rehab    Physical Therapy Evaluation    Patient Name: Lucas Mayer  MRN: [id number]  YOB: 1955  Encounter Date: 7/22/2025    Therapy Diagnosis:   Encounter Diagnoses   Name Primary?    S/P total knee arthroplasty, left     Impaired functional mobility, balance, gait, and endurance Yes     Physician: Homer Steel,*    Physician Orders: Eval and Treat  Medical Diagnosis: S/P total knee arthroplasty, left  Surgical Diagnosis: s/p left total knee arthroplasty   Surgical Date: 7/8/2025  Days Since Last Surgery: 14    Visit # / Visits Authorized:  1 / 1  Insurance Authorization Period: 6/27/2025 to 12/31/2025  Date of Evaluation: 7/22/2025  Plan of Care Certification: 7/22/2025 to 10/22/2025     Time In: 1055   Time Out: 1145  Total Time (in minutes): 50   Total Billable Time (in minutes): 50    Intake Outcome Measure for FOTO Survey    Therapist reviewed FOTO scores for Lucas Mayer on 7/22/2025.   FOTO report - see Media section or FOTO account episode details.     Intake Score (%): Not applicable for this Episode    Precautions: None       Subjective   History of Present Illness  Lucas is a 70 y.o. female who reports to physical therapy with a chief concern of decreased range of motion, decreased strength, and pain in the left knee.    Activities of Daily Living  Social history was obtained from Patient.    General Prior Level of Function Comments: Independent     Previously independent with activities of daily living? Yes     Currently independent with activities of daily living? No  Activities currently needing assistance include Bathing.      Previously independent with instrumental activities of daily living? Yes     Currently independent with instrumental activities of daily living? No  Activities currently needing assistance include: Community mobility, Driving, Grocery/shopping, and Home establishment and management.      Pain     Patient reports a current  pain level of 0/10.        0/10 pain at rest, 6/10 pain with activity, 10/10 pain with mobilization of the knee during home health physical therapy      Review of Systems  Left knee incision is well approximated with steri-strips intact.  No drainage noted.     Treatment History  Treatments  Discharged From Past 30 Days: Home health care  Previously Received Treatments: Yes  Previous Treatments: Exercise, Ice, Home exercise program, Physical therapy  Currently Receiving Treatments: No    Living Arrangements  Living Situation  Housing: Home independently  Living Arrangements: Alone  Support Systems: Friends/neighbors, Other (Comment)  Other Support System Comment: Boyfriend    Home Setup  Type of Structure: House  Home Access: Level entry  Number of Levels in Home: One level    Employment  Employment Status: Retired    Past Medical History/Physical Systems Review:   Lucas Mayer  has a past medical history of Cataract, Depression, Hypertension, Incontinence of urine, Nausea in adult, Other and unspecified hyperlipidemia, Post-operative nausea and vomiting, Sleep apnea, unspecified, Stool incontinence, Tremor of both hands, Trichiasis, and Vertigo.    Lucas Mayer  has a past surgical history that includes [place] tooth extraction; Oophorectomy; Tonsillectomy; Cataract extraction w/  intraocular lens implant (Left, 12/03/2018); Cataract extraction w/  intraocular lens implant (Left, 12/03/2018); Cataract extraction w/  intraocular lens implant (Right, 02/04/2019); Hysterectomy (08/2001); blepharoplasty, upper eyelid; Eye surgery (Bilateral); colonoscopy, screening, high risk patient (N/A, 12/19/2024); and Knee Arthroplasty (Left, 7/8/2025).    Lucas has a current medication list which includes the following prescription(s): amlodipine, atorvastatin, calcium carb/vit d3/minerals, citalopram, clobetasol, premarin, diazepam, diphenoxylate-atropine 2.5-0.025 mg, gabapentin, hydrocodone-acetaminophen,  hydrocodone-acetaminophen, and vibegron.    Review of patient's allergies indicates:   Allergen Reactions    Ancef [cefazolin] Anaphylaxis    Codeine Nausea And Vomiting    Pcn [penicillins] Rash        Objective       Knee Range of Motion   Left Knee   Active (deg) Passive (deg) Pain   Flexion 65       Extension -5                          Knee Strength   Right Strength Right Pain Left Strength Left  Pain   Flexion (S2)     3-     Prone Flexion           Extension (L3)     3-                   Ambulation Assistance Required  Surface With  Assistive Device Without Assistive Device Details   Level Independent Contact guard assist      Uneven         Curb           Gait Analysis  Base of Support: Wide  Gait Pattern: Antalgic  Walking Speed: Decreased    Right Side Walking Observations  Decreased: Step Length       Left Side Walking Observations  Decreased: Stance Time and Step Length            Treatment:  Therapeutic Exercise  TE 1: Ankle pumps x 20 reps  TE 2: Heel slides x 20 reps  TE 3: Supine hip abduction slides x 20 reps  TE 4: Quad sets x 20 reps  TE 5: Straight leg raises with assistance x 10 reps  Gait Training  GT 1: Gait training with the rolling walker focusing on increasing left lower extremity stance time.  GT 2: Gait training in the parallel bars without upper extremity support with CGA    Time Entry(in minutes):  PT Evaluation (Moderate) Time Entry: 30  Gait Training Time Entry: 10  Therapeutic Exercise Time Entry: 15    Assessment & Plan   Assessment  Lucas presents with a condition of Moderate complexity.   Presentation of Symptoms: Evolving  Will Comorbidities Impact Care: No       Functional Limitations: Activity tolerance, Ambulating on uneven surfaces, Decreased ambulation distance/endurance, Driving, Functional mobility, Gait limitations, Getting off the floor, Increased risk of fall, Maintaining balance, Painful locomotion/ambulation, Participating in leisure activities, Range of motion,  Squatting, Standing tolerance, Transfers  Impairments: Abnormal gait, Abnormal or restricted range of motion, Activity intolerance, Impaired balance, Impaired physical strength, Pain with functional activity    Patient Goal for Therapy (PT): The patient would like to return to independence with functional mobility.  Prognosis: Good    Plan  From a physical therapy perspective, the patient would benefit from: Skilled Rehab Services    Planned therapy interventions include: Therapeutic exercise, Therapeutic activities, Neuromuscular re-education, and Gait training.            Visit Frequency: 2 times Per Week for 6 Weeks.       This plan was discussed with Patient.   Discussion participants: Agreed Upon Plan of Care             The patient's spiritual, cultural, and educational needs were considered, and the patient is agreeable to the plan of care and goals.           Goals:   Active       Ambulation/movement       Patient will ambulate with normal gait pattern with no device       Start:  07/22/25    Expected End:  09/02/25               Functional outcome       Patient will demonstrate independence in home program for support of progression       Start:  07/22/25    Expected End:  08/02/25               Pain       Patient will report a 2 point reduction in pain while performing ambulation.       Start:  07/22/25    Expected End:  08/02/25               Range of Motion       Patient will achieve left knee ROM of  0-115 degrees        Start:  07/22/25    Expected End:  09/02/25               Strength       Patient will achieve left knee flexion strength of 4+/5       Start:  07/22/25    Expected End:  09/02/25            Patient will achieve left knee extension strength of 4+/5       Start:  07/22/25    Expected End:  09/02/25                David Dunn, PT

## 2025-07-25 ENCOUNTER — CLINICAL SUPPORT (OUTPATIENT)
Dept: REHABILITATION | Facility: HOSPITAL | Age: 70
End: 2025-07-25
Payer: MEDICARE

## 2025-07-25 DIAGNOSIS — Z74.09 IMPAIRED FUNCTIONAL MOBILITY, BALANCE, GAIT, AND ENDURANCE: Primary | ICD-10-CM

## 2025-07-25 PROCEDURE — 97530 THERAPEUTIC ACTIVITIES: CPT

## 2025-07-25 PROCEDURE — 97140 MANUAL THERAPY 1/> REGIONS: CPT

## 2025-07-25 PROCEDURE — 97112 NEUROMUSCULAR REEDUCATION: CPT

## 2025-07-25 PROCEDURE — 97110 THERAPEUTIC EXERCISES: CPT

## 2025-07-25 NOTE — PROGRESS NOTES
Outpatient Rehab    Physical Therapy Visit    Patient Name: Lucas Mayer  MRN: [id number]  YOB: 1955  Encounter Date: 7/25/2025    Therapy Diagnosis:   Encounter Diagnosis   Name Primary?    Impaired functional mobility, balance, gait, and endurance Yes     Physician: Homer Steel,*    Physician Orders: Eval and Treat  Medical Diagnosis: S/P total knee arthroplasty, left  Surgical Diagnosis: s/p left total knee arthroplasty   Surgical Date: 7/8/2025  Days Since Last Surgery: 17    Visit # / Visits Authorized:  1 / 6  Insurance Authorization Period: 7/25/2025 to 9/19/2025  Date of Evaluation: 7/22/2025  Plan of Care Certification: 7/24/2025 to 10/22/2025      PT/PTA:     Number of PTA visits since last PT visit:   Time In: 1230   Time Out: 1325  Total Time (in minutes): 55   Total Billable Time (in minutes): 55    FOTO:  Intake Score (%): Not applicable for this Episode  Survey Score 2 (%): Not applicable for this Episode  Survey Score 3 (%): Not applicable for this Episode    Precautions:         Subjective   s/p L TKA, knee is limited and painful.  Pain reported as 7/10. 3-4/10 at rest, 7-8/10 pain with activity    Objective       Knee Range of Motion   Left Knee   Active (deg) Passive (deg) Pain   Flexion 75       Extension -5                       Treatment:  Therapeutic Exercise  TE 2: Heel slides x10  TE 6: bike 10 minutes, partial revolutions for knee flexion AAROM  TE 7: seated knee flexion AAROM with L LE providing OP x10  Manual Therapy  MT 1: seated knee flexion PROM with traction and oscillations for pain reduction  MT 2: seated light massage for swelling  Balance/Neuromuscular Re-Education  NMR 1: SAQ with training for quad recruitment 2x10  NMR 2: quad sets with taping and verbal cues for quad recruitment 4x5  Therapeutic Activity  TA 1: review and reinforced HEP: long sitting TKE, knee flexion AAROM, ankle pumps/swelling reduction tools, hip ABD slides, heel  slides    Time Entry(in minutes):  Manual Therapy Time Entry: 15  Neuromuscular Re-Education Time Entry: 10  Therapeutic Activity Time Entry: 10  Therapeutic Exercise Time Entry: 15    Assessment & Plan   Assessment: Patient is >2 weeks s/p L TKA. Her knee flexion mobility is limited by pain. We were able to increase ROM by 10 degrees with PROM and 5 degrees AROM knee flexion. There is visible irritation of the skin superior and medial to the incision, took a photo to share with her MD, and patient notes she has been applying antibiotic cream since discovery. She has difficulty finding her quadriceps, but with training today this improves. Continue to benefit from skilled PT.  Evaluation/Treatment Tolerance: Patient limited by pain    The patient will continue to benefit from skilled outpatient physical therapy in order to address the deficits listed in the problem list on the initial evaluation, provide patient and family education, and maximize the patients level of independence in the home and community environments.     The patient's spiritual, cultural, and educational needs were considered, and the patient is agreeable to the plan of care and goals.           Plan: Continue as indicated for post op TKA    Goals:   Active       Ambulation/movement       Patient will ambulate with normal gait pattern with no device (Progressing)       Start:  07/22/25    Expected End:  09/02/25               Functional outcome       Patient will demonstrate independence in home program for support of progression (Progressing)       Start:  07/22/25    Expected End:  08/02/25               Pain       Patient will report a 2 point reduction in pain while performing ambulation. (Progressing)       Start:  07/22/25    Expected End:  08/02/25               Range of Motion       Patient will achieve left knee ROM of  0-115 degrees  (Progressing)       Start:  07/22/25    Expected End:  09/02/25               Strength       Patient  will achieve left knee flexion strength of 4+/5 (Progressing)       Start:  07/22/25    Expected End:  09/02/25            Patient will achieve left knee extension strength of 4+/5 (Progressing)       Start:  07/22/25    Expected End:  09/02/25                Carmen Tyler PT

## 2025-07-26 ENCOUNTER — PATIENT MESSAGE (OUTPATIENT)
Dept: ORTHOPEDICS | Facility: CLINIC | Age: 70
End: 2025-07-26
Payer: MEDICARE

## 2025-07-28 ENCOUNTER — HOSPITAL ENCOUNTER (EMERGENCY)
Facility: HOSPITAL | Age: 70
Discharge: HOME | End: 2025-07-28
Attending: EMERGENCY MEDICINE
Payer: MEDICARE

## 2025-07-28 VITALS
HEIGHT: 62 IN | TEMPERATURE: 98 F | BODY MASS INDEX: 36.44 KG/M2 | HEART RATE: 54 BPM | DIASTOLIC BLOOD PRESSURE: 75 MMHG | SYSTOLIC BLOOD PRESSURE: 161 MMHG | RESPIRATION RATE: 20 BRPM | WEIGHT: 198 LBS | OXYGEN SATURATION: 99 %

## 2025-07-28 DIAGNOSIS — M25.569 KNEE PAIN: ICD-10-CM

## 2025-07-28 DIAGNOSIS — G89.18 POST-OP PAIN: Primary | ICD-10-CM

## 2025-07-28 LAB
ABSOLUTE EOSINOPHIL (OHS): 0.23 K/UL
ABSOLUTE MONOCYTE (OHS): 0.45 K/UL (ref 0.3–1)
ABSOLUTE NEUTROPHIL COUNT (OHS): 3.37 K/UL (ref 1.8–7.7)
ANION GAP (OHS): 11 MMOL/L (ref 8–16)
BASOPHILS # BLD AUTO: 0.07 K/UL
BASOPHILS NFR BLD AUTO: 1.2 %
BUN SERPL-MCNC: 13 MG/DL (ref 8–23)
CALCIUM SERPL-MCNC: 9.7 MG/DL (ref 8.7–10.5)
CHLORIDE SERPL-SCNC: 108 MMOL/L (ref 95–110)
CO2 SERPL-SCNC: 21 MMOL/L (ref 23–29)
CREAT SERPL-MCNC: 0.6 MG/DL (ref 0.5–1.4)
CRP SERPL-MCNC: 3 MG/L
ERYTHROCYTE [DISTWIDTH] IN BLOOD BY AUTOMATED COUNT: 13.1 % (ref 11.5–14.5)
GFR SERPLBLD CREATININE-BSD FMLA CKD-EPI: >60 ML/MIN/1.73/M2
GLUCOSE SERPL-MCNC: 97 MG/DL (ref 70–110)
HCT VFR BLD AUTO: 37.7 % (ref 37–48.5)
HGB BLD-MCNC: 12.3 GM/DL (ref 12–16)
IMM GRANULOCYTES # BLD AUTO: 0.02 K/UL (ref 0–0.04)
IMM GRANULOCYTES NFR BLD AUTO: 0.3 % (ref 0–0.5)
LYMPHOCYTES # BLD AUTO: 1.92 K/UL (ref 1–4.8)
MCH RBC QN AUTO: 28.3 PG (ref 27–31)
MCHC RBC AUTO-ENTMCNC: 32.6 G/DL (ref 32–36)
MCV RBC AUTO: 87 FL (ref 82–98)
NUCLEATED RBC (/100WBC) (OHS): 0 /100 WBC
PLATELET # BLD AUTO: 303 K/UL (ref 150–450)
PMV BLD AUTO: 8.6 FL (ref 9.2–12.9)
POTASSIUM SERPL-SCNC: 4.3 MMOL/L (ref 3.5–5.1)
RBC # BLD AUTO: 4.35 M/UL (ref 4–5.4)
RELATIVE EOSINOPHIL (OHS): 3.8 %
RELATIVE LYMPHOCYTE (OHS): 31.7 % (ref 18–48)
RELATIVE MONOCYTE (OHS): 7.4 % (ref 4–15)
RELATIVE NEUTROPHIL (OHS): 55.6 % (ref 38–73)
SODIUM SERPL-SCNC: 140 MMOL/L (ref 136–145)
WBC # BLD AUTO: 6.06 K/UL (ref 3.9–12.7)

## 2025-07-28 PROCEDURE — 86140 C-REACTIVE PROTEIN: CPT | Performed by: EMERGENCY MEDICINE

## 2025-07-28 PROCEDURE — 80048 BASIC METABOLIC PNL TOTAL CA: CPT | Performed by: EMERGENCY MEDICINE

## 2025-07-28 PROCEDURE — 73562 X-RAY EXAM OF KNEE 3: CPT | Mod: 26,LT,, | Performed by: RADIOLOGY

## 2025-07-28 PROCEDURE — 85025 COMPLETE CBC W/AUTO DIFF WBC: CPT | Performed by: EMERGENCY MEDICINE

## 2025-07-28 PROCEDURE — 73562 X-RAY EXAM OF KNEE 3: CPT | Mod: TC,LT

## 2025-07-28 PROCEDURE — 99284 EMERGENCY DEPT VISIT MOD MDM: CPT | Mod: 25

## 2025-07-28 NOTE — ED PROVIDER NOTES
Encounter Date: 7/28/2025       History     Chief Complaint   Patient presents with    Post-op Problem     70-year-old female 3 weeks postop from left knee replacement with Dr. Steel as noticed some redness and bumps to the wound and today felt like she had some popping and crunching in the lower aspect of the wound and some stiffness to lower aspect of the wound.  No posterior calf tenderness no weakness or numbness distally.  No fever.  No drainage from the wound.  She states she has been going to physical therapy and it has been moving fairly well.  No other exacerbating relieving factors identified.      Review of patient's allergies indicates:   Allergen Reactions    Ancef [cefazolin] Anaphylaxis    Codeine Nausea And Vomiting    Pcn [penicillins] Rash     Past Medical History:   Diagnosis Date    Cataract     Depression     Hypertension     Incontinence of urine     Nausea in adult     nausea to certain scents and with motion    Other and unspecified hyperlipidemia     Post-operative nausea and vomiting     Sleep apnea, unspecified     wears CPAP HS    Stool incontinence     Tremor of both hands     Trichiasis     Vertigo      Past Surgical History:   Procedure Laterality Date    BLEPHAROPLASTY, UPPER EYELID      CATARACT EXTRACTION W/  INTRAOCULAR LENS IMPLANT Left 12/03/2018    Dr Foreman     CATARACT EXTRACTION W/  INTRAOCULAR LENS IMPLANT Left 12/03/2018    Procedure: EXTRACTION, CATARACT, WITH IOL INSERTION;  Surgeon: Emilie Foreman MD;  Location: [facility];  Service: Ophthalmology;  Laterality: Left;    CATARACT EXTRACTION W/  INTRAOCULAR LENS IMPLANT Right 02/04/2019    Procedure: EXTRACTION, CATARACT, WITH IOL INSERTION;  Surgeon: Emilie Foreman MD;  Location: [facility];  Service: Ophthalmology;  Laterality: Right;    COLONOSCOPY, SCREENING, HIGH RISK PATIENT N/A 12/19/2024    Procedure: COLONOSCOPY, SCREENING, HIGH RISK PATIENT;  Surgeon: Hernandez Cordero MD;  Location: [facility];  Service: Endoscopy;   Laterality: N/A;    EYE SURGERY Bilateral     bilateral    HYSTERECTOMY  08/2001    fibroids- BK/BSO    KNEE ARTHROPLASTY Left 7/8/2025    Procedure: ARTHROPLASTY, KNEE;  Surgeon: Homer Steel MD;  Location: [facility] OR;  Service: Orthopedics;  Laterality: Left;    OOPHORECTOMY      TONSILLECTOMY      WISDOM TOOTH EXTRACTION       Family History   Problem Relation Name Age of Onset    Diabetes Mother      Stroke Mother      Essential Tremor Mother      Melanoma Father      Hypertension Father      Essential Tremor Sister      Essential Tremor Sister      Breast cancer Neg Hx      Cancer Neg Hx      Eclampsia Neg Hx       Social History[1]  Review of Systems   Constitutional:  Negative for fever.   HENT:  Negative for sore throat.    Respiratory:  Negative for shortness of breath.    Cardiovascular:  Negative for chest pain.   Gastrointestinal:  Negative for nausea.   Genitourinary:  Negative for dysuria.   Musculoskeletal:  Positive for arthralgias and joint swelling. Negative for back pain.   Skin:  Negative for rash.   Neurological:  Negative for weakness.   Hematological:  Does not bruise/bleed easily.       Physical Exam     Initial Vitals [07/28/25 1841]   BP Pulse Resp Temp SpO2   (!) 151/65 65 20 97.9 °F (36.6 °C) 99 %      MAP       --         Physical Exam    Nursing note and vitals reviewed.  Constitutional: She appears well-developed and well-nourished.   HENT:   Head: Normocephalic and atraumatic.   Eyes: EOM are normal. Pupils are equal, round, and reactive to light.   Neck: Neck supple.   Normal range of motion.  Cardiovascular:  Normal rate, regular rhythm and normal heart sounds.           Pulmonary/Chest: Breath sounds normal. No respiratory distress.   Abdominal: Abdomen is soft. Bowel sounds are normal. She exhibits no distension. There is no abdominal tenderness.   Musculoskeletal:      Cervical back: Normal range of motion and neck supple.      Comments: Left knee vertical oriented  postoperative wound with Steri-Strips in place minimal surrounding erythema no significant drainage no crepitus.  Mild tenderness inferior aspect of wound.  Compartments soft distally.  2+ DP and PT pulses sensation motor intact.  Flexion and extension actively at the bedside with minimal discomfort.  At the knee joint.     Neurological: She is alert and oriented to person, place, and time. She has normal strength. No cranial nerve deficit or sensory deficit.   Skin: Skin is warm and dry.   Psychiatric: She has a normal mood and affect.         ED Course   Procedures  Labs Reviewed   BASIC METABOLIC PANEL - Abnormal       Result Value    Sodium 140      Potassium 4.3      Chloride 108      CO2 21 (*)     Glucose 97      BUN 13      Creatinine 0.6      Calcium 9.7      Anion Gap 11      eGFR >60     CBC WITH DIFFERENTIAL - Abnormal    WBC 6.06      RBC 4.35      HGB 12.3      HCT 37.7      MCV 87      MCH 28.3      MCHC 32.6      RDW 13.1      Platelet Count 303      MPV 8.6 (*)     Nucleated RBC 0      Neut % 55.6      Lymph % 31.7      Mono % 7.4      Eos % 3.8      Basophil % 1.2      Imm Grans % 0.3      Neut # 3.37      Lymph # 1.92      Mono # 0.45      Eos # 0.23      Baso # 0.07      Imm Grans # 0.02     C-REACTIVE PROTEIN - Normal    CRP 3.0     CBC W/ AUTO DIFFERENTIAL    Narrative:     The following orders were created for panel order CBC auto differential.  Procedure                               Abnormality         Status                     ---------                               -----------         ------                     CBC with Differential[[id number]]       Abnormal            Final result                 Please view results for these tests on the individual orders.          Imaging Results              X-Ray Knee 3 View Left (Final result)  Result time 07/28/25 20:01:09      Final result by Bruce Domingo DO (07/28/25 20:01:09)                   Impression:      As  above.      Electronically signed by: Bruce Domingo  Date:    07/28/2025  Time:    20:01               Narrative:    EXAMINATION:  XR KNEE 3 VIEW LEFT    CLINICAL HISTORY:  Pain in unspecified knee    TECHNIQUE:  AP, lateral, and Merchant views of the left knee were performed.    COMPARISON:  07/08/2025.    FINDINGS:  There are postop changes of total knee arthroplasty.  Hardware is intact and is well aligned.  Remaining osseous structures are intact.  There is osteopenia.  There is no joint effusion.                                       Medications - No data to display  Medical Decision Making  Differential diagnosis; postop pain, postop infection, scar tissue, hardware malfunction, DVT    Plan; patient was anterior tenderness over the wound and some stiffness and feeling popping today.  Follow up basic labs, x-ray, reassess.      X-ray reviewed by me no definite abnormalities.  Normal CBC, electrolytes normal CRP.  I do not suspect joint infection or wound infection at this time.  I do not suspect a DVT.  I did text Dr. Steel patient's orthopedist and I encouraged the patient to follow up with a phone call tomorrow until orthopedic office for evaluation in their office this week.  I gave the patient strict follow up and return instructions and patient is aware of this.  Stable for discharge at this time.    Amount and/or Complexity of Data Reviewed  Labs: ordered.  Radiology: ordered.                                          Clinical Impression:  Final diagnoses:  [M25.569] Knee pain  [G89.18] Post-op pain (Primary)          ED Disposition Condition    Discharge Stable          ED Prescriptions    None       Follow-up Information       Follow up With Specialties Details Why Contact Info    Homer Steel MD Orthopedic Surgery In 1 day  [address] [facility] [address]  [phone]                     [1]   Social History  Tobacco Use    Smoking status: Never     Passive exposure: Never     Smokeless tobacco: Never   Vaping Use    Vaping status: Never Used   Substance Use Topics    Alcohol use: Yes     Alcohol/week: 0.8 standard drinks of alcohol     Types: 1 Standard drinks or equivalent per week     Comment: Social    Drug use: No        Vin Wolff MD  07/29/25 0342

## 2025-07-29 ENCOUNTER — CLINICAL SUPPORT (OUTPATIENT)
Dept: REHABILITATION | Facility: HOSPITAL | Age: 70
End: 2025-07-29
Payer: MEDICARE

## 2025-07-29 ENCOUNTER — TELEPHONE (OUTPATIENT)
Dept: ORTHOPEDICS | Facility: CLINIC | Age: 70
End: 2025-07-29
Payer: MEDICARE

## 2025-07-29 DIAGNOSIS — Z74.09 IMPAIRED FUNCTIONAL MOBILITY, BALANCE, GAIT, AND ENDURANCE: Primary | ICD-10-CM

## 2025-07-29 PROCEDURE — 97530 THERAPEUTIC ACTIVITIES: CPT

## 2025-07-29 PROCEDURE — 97110 THERAPEUTIC EXERCISES: CPT

## 2025-07-29 NOTE — PROGRESS NOTES
Outpatient Rehab    Physical Therapy Visit    Patient Name: Lucas Mayer  MRN: [id number]  YOB: 1955  Encounter Date: 7/29/2025    Therapy Diagnosis:   Encounter Diagnosis   Name Primary?    Impaired functional mobility, balance, gait, and endurance Yes     Physician: Homer Steel,*    Physician Orders: Eval and Treat  Medical Diagnosis: S/P total knee arthroplasty, left  Surgical Diagnosis: s/p left total knee arthroplasty   Surgical Date: 7/8/2025  Days Since Last Surgery: 21    Visit # / Visits Authorized:  2 / 6  Insurance Authorization Period: 7/25/2025 to 9/19/2025  Date of Evaluation: 7/22/2025  Plan of Care Certification: 7/24/2025 to 10/22/2025      PT/PTA:     Number of PTA visits since last PT visit:   Time In: 0900   Time Out: 0945  Total Time (in minutes): 45   Total Billable Time (in minutes):      FOTO:  Intake Score (%): Not applicable for this Episode  Survey Score 2 (%): Not applicable for this Episode  Survey Score 3 (%): Not applicable for this Episode    Precautions:         Subjective   Reports severe pain and went to ED yesterday evening secondary to pain. Attributed to working it out too much yesterday..         Objective            Treatment:  Therapeutic Exercise  TE 2: Heel slides x25  TE 6: bike 10 minutes, partial revolutions for knee flexion AAROM  TE 7: seated knee flexion AAROM with L LE providing OP x10  TE 8: Stepper 7 min level 1  Manual Therapy  MT 1: seated knee flexion PROM with traction and oscillations for pain reduction  MT 2: seated light massage for swelling  Balance/Neuromuscular Re-Education  NMR 2: quad sets with taping and verbal cues for quad recruitment 4x5  Therapeutic Activity  TA 1: review and reinforced HEP: long sitting TKE, knee flexion AAROM, ankle pumps/swelling reduction tools, hip ABD slides, heel slides    Time Entry(in minutes):  Neuromuscular Re-Education Time Entry: 5  Therapeutic Activity Time Entry: 10  Therapeutic  Exercise Time Entry: 15    Assessment & Plan   Assessment: PRAVIN knee flexion measured to 55 degrees; poor mobility noted during all interventions. Encouraged patient to continue HEP and progress as tolerated moving forward with treatment.        The patient will continue to benefit from skilled outpatient physical therapy in order to address the deficits listed in the problem list on the initial evaluation, provide patient and family education, and maximize the patients level of independence in the home and community environments.     The patient's spiritual, cultural, and educational needs were considered, and the patient is agreeable to the plan of care and goals.           Plan:      Goals:   Active       Ambulation/movement       Patient will ambulate with normal gait pattern with no device (Progressing)       Start:  07/22/25    Expected End:  09/02/25               Functional outcome       Patient will demonstrate independence in home program for support of progression (Progressing)       Start:  07/22/25    Expected End:  08/02/25               Pain       Patient will report a 2 point reduction in pain while performing ambulation. (Progressing)       Start:  07/22/25    Expected End:  08/02/25               Range of Motion       Patient will achieve left knee ROM of  0-115 degrees  (Progressing)       Start:  07/22/25    Expected End:  09/02/25               Strength       Patient will achieve left knee flexion strength of 4+/5 (Progressing)       Start:  07/22/25    Expected End:  09/02/25            Patient will achieve left knee extension strength of 4+/5 (Progressing)       Start:  07/22/25    Expected End:  09/02/25                Franco Isabel PT

## 2025-07-29 NOTE — TELEPHONE ENCOUNTER
"Called pt to provide ED f/u visit date/time  No answer.   recording: "this phone does not use voicemail, please send a text or call back"   Portal message sent   "

## 2025-07-29 NOTE — ED NOTES
Pt presents to ED via POV accompanied by pt friend for increased pain to left knee.  Pt reports left knee replacement on 7/8/25 by Dr. Steel here.  Reports all has been well at home, but has had some increased pain and swelling over the past 24 hrs.  Pt reports left knee has been swollen, red, and inflamed.  Reports using topical cream to reddened area.  Swelling noted to left knee.  Surgical wound intact, steri strips in place.  Localized redness noted to surgical site.  Skin warm and dry.  No other concerns voiced at this time.  NAD noted.

## 2025-07-31 ENCOUNTER — CLINICAL SUPPORT (OUTPATIENT)
Dept: REHABILITATION | Facility: HOSPITAL | Age: 70
End: 2025-07-31
Payer: MEDICARE

## 2025-07-31 DIAGNOSIS — Z74.09 IMPAIRED FUNCTIONAL MOBILITY, BALANCE, GAIT, AND ENDURANCE: Primary | ICD-10-CM

## 2025-07-31 PROCEDURE — 97110 THERAPEUTIC EXERCISES: CPT

## 2025-07-31 NOTE — PROGRESS NOTES
Outpatient Rehab    Physical Therapy Visit    Patient Name: Lucas Mayer  MRN: [id number]  YOB: 1955  Encounter Date: 7/31/2025    Therapy Diagnosis:   Encounter Diagnosis   Name Primary?    Impaired functional mobility, balance, gait, and endurance Yes     Physician: Homer Steel,*    Physician Orders: Eval and Treat  Medical Diagnosis: S/P total knee arthroplasty, left  Surgical Diagnosis: s/p left total knee arthroplasty   Surgical Date: 7/8/2025  Days Since Last Surgery: 23    Visit # / Visits Authorized:  3 / 6  Insurance Authorization Period: 7/25/2025 to 9/19/2025  Date of Evaluation: 7/22/2025  Plan of Care Certification: 7/24/2025 to 10/22/2025      PT/PTA:     Number of PTA visits since last PT visit:   Time In: 1000   Time Out: 1055  Total Time (in minutes): 55   Total Billable Time (in minutes):      FOTO:  Intake Score (%): Not applicable for this Episode  Survey Score 2 (%): Not applicable for this Episode  Survey Score 3 (%): Not applicable for this Episode    Precautions:         Subjective   Reports improved symptoms as compared to last treatment..         Objective            Treatment:  Therapeutic Exercise  TE 2: 3 min x 4 heel slides with strap  TE 8: Stepper 10 min level 1  Manual Therapy  MT 1: seated knee flexion PROM with traction and oscillations for pain reduction  MT 2: seated light massage for swelling  Balance/Neuromuscular Re-Education  NMR 2: quad sets with taping and verbal cues for quad recruitment 4x5  Therapeutic Activity  TA 1: review and reinforced HEP: long sitting TKE, knee flexion AAROM, ankle pumps/swelling reduction tools, hip ABD slides, heel slides    Time Entry(in minutes):  Neuromuscular Re-Education Time Entry: 5  Therapeutic Activity Time Entry: 5  Therapeutic Exercise Time Entry: 20    Assessment & Plan   Assessment: Knee flexion measured to 76 degrees following AAROM interventions. Progress all ROM and functional mobility as  tolerated moving forward with treatment.        The patient will continue to benefit from skilled outpatient physical therapy in order to address the deficits listed in the problem list on the initial evaluation, provide patient and family education, and maximize the patients level of independence in the home and community environments.     The patient's spiritual, cultural, and educational needs were considered, and the patient is agreeable to the plan of care and goals.           Plan:      Goals:   Active       Ambulation/movement       Patient will ambulate with normal gait pattern with no device (Progressing)       Start:  07/22/25    Expected End:  09/02/25               Functional outcome       Patient will demonstrate independence in home program for support of progression (Progressing)       Start:  07/22/25    Expected End:  08/02/25               Pain       Patient will report a 2 point reduction in pain while performing ambulation. (Progressing)       Start:  07/22/25    Expected End:  08/02/25               Range of Motion       Patient will achieve left knee ROM of  0-115 degrees  (Progressing)       Start:  07/22/25    Expected End:  09/02/25               Strength       Patient will achieve left knee flexion strength of 4+/5 (Progressing)       Start:  07/22/25    Expected End:  09/02/25            Patient will achieve left knee extension strength of 4+/5 (Progressing)       Start:  07/22/25    Expected End:  09/02/25                Franco Isabel PT

## 2025-08-04 ENCOUNTER — CLINICAL SUPPORT (OUTPATIENT)
Dept: REHABILITATION | Facility: HOSPITAL | Age: 70
End: 2025-08-04
Payer: MEDICARE

## 2025-08-04 DIAGNOSIS — Z74.09 IMPAIRED FUNCTIONAL MOBILITY, BALANCE, GAIT, AND ENDURANCE: Primary | ICD-10-CM

## 2025-08-04 PROCEDURE — 97112 NEUROMUSCULAR REEDUCATION: CPT

## 2025-08-04 PROCEDURE — 97110 THERAPEUTIC EXERCISES: CPT

## 2025-08-04 NOTE — PROGRESS NOTES
Outpatient Rehab    Physical Therapy Visit    Patient Name: Lucas Mayer  MRN: [id number]  YOB: 1955  Encounter Date: 8/4/2025    Therapy Diagnosis:   Encounter Diagnosis   Name Primary?    Impaired functional mobility, balance, gait, and endurance Yes     Physician: Homer Steel,*    Physician Orders: Eval and Treat  Medical Diagnosis: S/P total knee arthroplasty, left  Surgical Diagnosis: s/p left total knee arthroplasty   Surgical Date: 7/8/2025  Days Since Last Surgery: 27    Visit # / Visits Authorized:  4 / 6  Insurance Authorization Period: 7/25/2025 to 9/19/2025  Date of Evaluation: 7/22/2025  Plan of Care Certification: 7/24/2025 to 10/22/2025      PT/PTA:     Number of PTA visits since last PT visit:   Time In: 0900   Time Out: 1000  Total Time (in minutes): 60   Total Billable Time (in minutes):      FOTO:  Intake Score (%): Not applicable for this Episode  Survey Score 2 (%): Not applicable for this Episode  Survey Score 3 (%): Not applicable for this Episode    Precautions:         Subjective   Reports improved ability to walk as compared to last week. Improved symptoms..         Objective            Treatment:  Therapeutic Exercise  TE 1: Sci stepper 10 min level 1  TE 2: 3 min x 4 heel slides with strap  TE 3: Bike 1/2 revolutions x 5 min  TE 8: Stepper 10 min level 1  Manual Therapy  MT 1: seated knee flexion PROM with traction and oscillations for pain reduction  MT 2: seated light massage for swelling  Balance/Neuromuscular Re-Education  NMR 2: quad sets x 20  NMR 3: SLR 2 x 10  Therapeutic Activity  TA 1: review and reinforced HEP: long sitting TKE, knee flexion AAROM, ankle pumps/swelling reduction tools, hip ABD slides, heel slides    Time Entry(in minutes):  Manual Therapy Time Entry: 5  Neuromuscular Re-Education Time Entry: 20  Therapeutic Exercise Time Entry: 30    Assessment & Plan   Assessment: Weakness noted with SLR; progress strength as tolerated. AAROM  knee flexion measured to 80 degrees; encouraged patient to continue HEP to improve ROM.        The patient will continue to benefit from skilled outpatient physical therapy in order to address the deficits listed in the problem list on the initial evaluation, provide patient and family education, and maximize the patients level of independence in the home and community environments.     The patient's spiritual, cultural, and educational needs were considered, and the patient is agreeable to the plan of care and goals.           Plan:      Goals:   Active       Ambulation/movement       Patient will ambulate with normal gait pattern with no device (Progressing)       Start:  07/22/25    Expected End:  09/02/25               Functional outcome       Patient will demonstrate independence in home program for support of progression (Progressing)       Start:  07/22/25    Expected End:  08/02/25               Pain       Patient will report a 2 point reduction in pain while performing ambulation. (Progressing)       Start:  07/22/25    Expected End:  08/02/25               Range of Motion       Patient will achieve left knee ROM of  0-115 degrees  (Progressing)       Start:  07/22/25    Expected End:  09/02/25               Strength       Patient will achieve left knee flexion strength of 4+/5 (Progressing)       Start:  07/22/25    Expected End:  09/02/25            Patient will achieve left knee extension strength of 4+/5 (Progressing)       Start:  07/22/25    Expected End:  09/02/25                Franco Isabel PT

## 2025-08-08 ENCOUNTER — CLINICAL SUPPORT (OUTPATIENT)
Dept: REHABILITATION | Facility: HOSPITAL | Age: 70
End: 2025-08-08
Payer: MEDICARE

## 2025-08-08 DIAGNOSIS — Z74.09 IMPAIRED FUNCTIONAL MOBILITY, BALANCE, GAIT, AND ENDURANCE: Primary | ICD-10-CM

## 2025-08-08 PROCEDURE — 97110 THERAPEUTIC EXERCISES: CPT

## 2025-08-08 PROCEDURE — 97140 MANUAL THERAPY 1/> REGIONS: CPT

## 2025-08-11 ENCOUNTER — CLINICAL SUPPORT (OUTPATIENT)
Dept: REHABILITATION | Facility: HOSPITAL | Age: 70
End: 2025-08-11
Payer: MEDICARE

## 2025-08-11 DIAGNOSIS — Z74.09 IMPAIRED FUNCTIONAL MOBILITY, BALANCE, GAIT, AND ENDURANCE: Primary | ICD-10-CM

## 2025-08-11 PROCEDURE — 97110 THERAPEUTIC EXERCISES: CPT

## 2025-08-13 ENCOUNTER — OFFICE VISIT (OUTPATIENT)
Dept: UROGYNECOLOGY | Facility: CLINIC | Age: 70
End: 2025-08-13
Payer: MEDICARE

## 2025-08-13 VITALS
HEIGHT: 62 IN | WEIGHT: 198 LBS | SYSTOLIC BLOOD PRESSURE: 138 MMHG | BODY MASS INDEX: 36.44 KG/M2 | DIASTOLIC BLOOD PRESSURE: 82 MMHG | HEART RATE: 66 BPM

## 2025-08-13 DIAGNOSIS — N39.46 MIXED INCONTINENCE URGE AND STRESS: Primary | ICD-10-CM

## 2025-08-13 DIAGNOSIS — R15.9 FULL INCONTINENCE OF FECES: ICD-10-CM

## 2025-08-13 PROCEDURE — 99213 OFFICE O/P EST LOW 20 MIN: CPT | Mod: S$GLB,,, | Performed by: OBSTETRICS & GYNECOLOGY

## 2025-08-13 PROCEDURE — 1125F AMNT PAIN NOTED PAIN PRSNT: CPT | Mod: CPTII,S$GLB,, | Performed by: OBSTETRICS & GYNECOLOGY

## 2025-08-13 PROCEDURE — 3044F HG A1C LEVEL LT 7.0%: CPT | Mod: CPTII,S$GLB,, | Performed by: OBSTETRICS & GYNECOLOGY

## 2025-08-13 PROCEDURE — 3075F SYST BP GE 130 - 139MM HG: CPT | Mod: CPTII,S$GLB,, | Performed by: OBSTETRICS & GYNECOLOGY

## 2025-08-13 PROCEDURE — 3008F BODY MASS INDEX DOCD: CPT | Mod: CPTII,S$GLB,, | Performed by: OBSTETRICS & GYNECOLOGY

## 2025-08-13 PROCEDURE — 3079F DIAST BP 80-89 MM HG: CPT | Mod: CPTII,S$GLB,, | Performed by: OBSTETRICS & GYNECOLOGY

## 2025-08-13 PROCEDURE — 99999 PR PBB SHADOW E&M-EST. PATIENT-LVL III: CPT | Mod: PBBFAC,,, | Performed by: OBSTETRICS & GYNECOLOGY

## 2025-08-13 PROCEDURE — 3288F FALL RISK ASSESSMENT DOCD: CPT | Mod: CPTII,S$GLB,, | Performed by: OBSTETRICS & GYNECOLOGY

## 2025-08-13 PROCEDURE — 1101F PT FALLS ASSESS-DOCD LE1/YR: CPT | Mod: CPTII,S$GLB,, | Performed by: OBSTETRICS & GYNECOLOGY

## 2025-08-13 PROCEDURE — 1159F MED LIST DOCD IN RCRD: CPT | Mod: CPTII,S$GLB,, | Performed by: OBSTETRICS & GYNECOLOGY

## 2025-08-13 RX ORDER — BUDESONIDE 3 MG/1
9 CAPSULE, COATED PELLETS ORAL
COMMUNITY
Start: 2025-07-16

## 2025-08-15 ENCOUNTER — CLINICAL SUPPORT (OUTPATIENT)
Dept: REHABILITATION | Facility: HOSPITAL | Age: 70
End: 2025-08-15
Payer: MEDICARE

## 2025-08-15 DIAGNOSIS — Z74.09 IMPAIRED FUNCTIONAL MOBILITY, BALANCE, GAIT, AND ENDURANCE: Primary | ICD-10-CM

## 2025-08-15 PROCEDURE — 97140 MANUAL THERAPY 1/> REGIONS: CPT

## 2025-08-15 PROCEDURE — 97110 THERAPEUTIC EXERCISES: CPT

## 2025-08-19 ENCOUNTER — CLINICAL SUPPORT (OUTPATIENT)
Dept: REHABILITATION | Facility: HOSPITAL | Age: 70
End: 2025-08-19
Payer: MEDICARE

## 2025-08-19 DIAGNOSIS — Z74.09 IMPAIRED FUNCTIONAL MOBILITY, BALANCE, GAIT, AND ENDURANCE: Primary | Chronic | ICD-10-CM

## 2025-08-19 PROCEDURE — 97110 THERAPEUTIC EXERCISES: CPT

## 2025-08-19 PROCEDURE — 97140 MANUAL THERAPY 1/> REGIONS: CPT

## 2025-08-21 DIAGNOSIS — F32.A DEPRESSION, UNSPECIFIED DEPRESSION TYPE: ICD-10-CM

## 2025-08-22 ENCOUNTER — CLINICAL SUPPORT (OUTPATIENT)
Dept: REHABILITATION | Facility: HOSPITAL | Age: 70
End: 2025-08-22
Payer: MEDICARE

## 2025-08-22 DIAGNOSIS — Z74.09 IMPAIRED FUNCTIONAL MOBILITY, BALANCE, GAIT, AND ENDURANCE: Primary | Chronic | ICD-10-CM

## 2025-08-22 PROCEDURE — 97140 MANUAL THERAPY 1/> REGIONS: CPT

## 2025-08-22 PROCEDURE — 97110 THERAPEUTIC EXERCISES: CPT

## 2025-08-22 PROCEDURE — 97530 THERAPEUTIC ACTIVITIES: CPT

## 2025-08-22 RX ORDER — DIAZEPAM 2 MG/1
2 TABLET ORAL EVERY 8 HOURS PRN
Qty: 60 TABLET | Refills: 2 | Status: SHIPPED | OUTPATIENT
Start: 2025-08-22

## 2025-08-25 ENCOUNTER — CLINICAL SUPPORT (OUTPATIENT)
Dept: REHABILITATION | Facility: HOSPITAL | Age: 70
End: 2025-08-25
Payer: MEDICARE

## 2025-08-25 DIAGNOSIS — Z74.09 IMPAIRED FUNCTIONAL MOBILITY, BALANCE, GAIT, AND ENDURANCE: Primary | Chronic | ICD-10-CM

## 2025-08-25 PROCEDURE — 97530 THERAPEUTIC ACTIVITIES: CPT

## 2025-08-25 PROCEDURE — 97110 THERAPEUTIC EXERCISES: CPT

## 2025-08-29 ENCOUNTER — CLINICAL SUPPORT (OUTPATIENT)
Dept: REHABILITATION | Facility: HOSPITAL | Age: 70
End: 2025-08-29
Payer: MEDICARE

## 2025-08-29 DIAGNOSIS — Z96.652 S/P TOTAL KNEE ARTHROPLASTY, LEFT: Primary | ICD-10-CM

## 2025-08-29 DIAGNOSIS — Z74.09 IMPAIRED FUNCTIONAL MOBILITY, BALANCE, GAIT, AND ENDURANCE: Primary | Chronic | ICD-10-CM

## 2025-08-29 PROCEDURE — 97530 THERAPEUTIC ACTIVITIES: CPT

## 2025-08-29 PROCEDURE — 97110 THERAPEUTIC EXERCISES: CPT

## 2025-09-02 ENCOUNTER — CLINICAL SUPPORT (OUTPATIENT)
Dept: REHABILITATION | Facility: HOSPITAL | Age: 70
End: 2025-09-02
Payer: MEDICARE

## 2025-09-02 DIAGNOSIS — Z74.09 IMPAIRED FUNCTIONAL MOBILITY, BALANCE, GAIT, AND ENDURANCE: Primary | Chronic | ICD-10-CM

## 2025-09-02 PROCEDURE — 97530 THERAPEUTIC ACTIVITIES: CPT

## 2025-09-02 PROCEDURE — 97110 THERAPEUTIC EXERCISES: CPT

## 2025-09-04 ENCOUNTER — HOSPITAL ENCOUNTER (OUTPATIENT)
Dept: RADIOLOGY | Facility: HOSPITAL | Age: 70
Discharge: HOME | End: 2025-09-04
Attending: ORTHOPAEDIC SURGERY
Payer: MEDICARE

## 2025-09-04 ENCOUNTER — OFFICE VISIT (OUTPATIENT)
Dept: ORTHOPEDICS | Facility: CLINIC | Age: 70
End: 2025-09-04
Payer: MEDICARE

## 2025-09-04 VITALS — HEIGHT: 62 IN | WEIGHT: 196.19 LBS | BODY MASS INDEX: 36.1 KG/M2

## 2025-09-04 DIAGNOSIS — Z96.652 S/P TOTAL KNEE ARTHROPLASTY, LEFT: Primary | ICD-10-CM

## 2025-09-04 DIAGNOSIS — Z96.652 S/P TOTAL KNEE ARTHROPLASTY, LEFT: ICD-10-CM

## 2025-09-04 PROCEDURE — 73562 X-RAY EXAM OF KNEE 3: CPT | Mod: TC,PN,LT

## 2025-09-04 PROCEDURE — 1159F MED LIST DOCD IN RCRD: CPT | Mod: CPTII,S$GLB,, | Performed by: ORTHOPAEDIC SURGERY

## 2025-09-04 PROCEDURE — 3288F FALL RISK ASSESSMENT DOCD: CPT | Mod: CPTII,S$GLB,, | Performed by: ORTHOPAEDIC SURGERY

## 2025-09-04 PROCEDURE — 1125F AMNT PAIN NOTED PAIN PRSNT: CPT | Mod: CPTII,S$GLB,, | Performed by: ORTHOPAEDIC SURGERY

## 2025-09-04 PROCEDURE — 1101F PT FALLS ASSESS-DOCD LE1/YR: CPT | Mod: CPTII,S$GLB,, | Performed by: ORTHOPAEDIC SURGERY

## 2025-09-04 PROCEDURE — 73562 X-RAY EXAM OF KNEE 3: CPT | Mod: 26,LT,, | Performed by: RADIOLOGY

## 2025-09-04 PROCEDURE — 99999 PR PBB SHADOW E&M-EST. PATIENT-LVL III: CPT | Mod: PBBFAC,,, | Performed by: ORTHOPAEDIC SURGERY

## 2025-09-04 PROCEDURE — 99024 POSTOP FOLLOW-UP VISIT: CPT | Mod: S$GLB,,, | Performed by: ORTHOPAEDIC SURGERY

## 2025-09-04 PROCEDURE — 1160F RVW MEDS BY RX/DR IN RCRD: CPT | Mod: CPTII,S$GLB,, | Performed by: ORTHOPAEDIC SURGERY

## 2025-09-04 PROCEDURE — 3044F HG A1C LEVEL LT 7.0%: CPT | Mod: CPTII,S$GLB,, | Performed by: ORTHOPAEDIC SURGERY

## 2025-09-04 RX ORDER — CELECOXIB 200 MG/1
200 CAPSULE ORAL 2 TIMES DAILY
Qty: 60 CAPSULE | Refills: 1 | Status: SHIPPED | OUTPATIENT
Start: 2025-09-04

## 2025-09-05 ENCOUNTER — TELEPHONE (OUTPATIENT)
Dept: ORTHOPEDICS | Facility: CLINIC | Age: 70
End: 2025-09-05
Payer: MEDICARE

## (undated) DEVICE — GLOVE SENSICARE PI SURG 7.5

## (undated) DEVICE — SUT 2-0 VICRYL FS-1 UND

## (undated) DEVICE — SUT 2-0 VICRYL / CT-1

## (undated) DEVICE — SUT VICRYL 0 27 CT-2

## (undated) DEVICE — DRAPE INCISE IOBAN 2 23X33IN

## (undated) DEVICE — INTERPULSE SET

## (undated) DEVICE — BANDAGE ESMARK 6X12

## (undated) DEVICE — SLEEVE SCD EXPRESS KNEE MEDIUM

## (undated) DEVICE — GLOVE BIOGEL SKINSENSE PI 7.5

## (undated) DEVICE — DRAPE STERI INSTRUMENT 1018

## (undated) DEVICE — CASSETTE INFINITI

## (undated) DEVICE — DRAPE THREE-QUARTER 53X77IN

## (undated) DEVICE — CLOSURE SKIN STERI STRIP 1/2X4

## (undated) DEVICE — DRESSING AQUACEL AG 3.5X10IN

## (undated) DEVICE — TOGA FLYTE PEEL AWAY XLARGE

## (undated) DEVICE — GLOVE SENSICARE PI GRN 7

## (undated) DEVICE — DRAPE U STD FILM LG 60X84IN

## (undated) DEVICE — STRIP MEDI WND CLSR 1/2X4IN

## (undated) DEVICE — BNDG COFLEX FOAM LF2 ST 4X5YD

## (undated) DEVICE — COVER PROXIMA MAYO STAND

## (undated) DEVICE — SOL IRR NACL .9% 3000ML

## (undated) DEVICE — TOWEL OR DISP STRL BLUE 4/PK

## (undated) DEVICE — GLOVE SENSICARE PI SURG 6.5

## (undated) DEVICE — SOL BETADINE 5%

## (undated) DEVICE — Device

## (undated) DEVICE — BANDAGE ESMARK ELASTIC ST 6X9

## (undated) DEVICE — BLADE EZ CLEAN 2.5IN MODIFIED

## (undated) DEVICE — COVER TABLE 44X90 STERILE

## (undated) DEVICE — GLOVE BIOGEL SKINSENSE PI 6.5

## (undated) DEVICE — GLOVE SENSICARE PI SURG 7

## (undated) DEVICE — SYR SLIP TIP 1CC

## (undated) DEVICE — SUT 0 VICRYL / CT-1

## (undated) DEVICE — BANDAGE MATRIX HK LOOP 6IN 5YD

## (undated) DEVICE — BNDG COFLEX FOAM LF2 ST 6X5YD

## (undated) DEVICE — BLADE SURG CARBON STEEL #10

## (undated) DEVICE — SPONGE COTTON TRAY 4X4IN

## (undated) DEVICE — PENCIL ROCKER SWITCH 10FT CORD

## (undated) DEVICE — SOCKINETTE IMPERVIOUS 12X48IN

## (undated) DEVICE — SPONGE LAP 18X18 PREWASHED

## (undated) DEVICE — GLOVE SENSICARE PI GRN 8

## (undated) DEVICE — GLOVE SENSICARE PI GRN 6.5

## (undated) DEVICE — DRAPE T EXTRM SURG 121X128X90

## (undated) DEVICE — PAD IMP PROTCT GEL BOOT INSERT

## (undated) DEVICE — MANIFOLD 4 PORT

## (undated) DEVICE — GLOVE SENSICARE PI SURG 6

## (undated) DEVICE — PAD CAST SPECIALIST STRL 4

## (undated) DEVICE — BLADE SAW SGTL DL STR 25X1.27X

## (undated) DEVICE — WRAP KNEE ACCU THERM GEL PACK

## (undated) DEVICE — PRESSURIZER HI VAC HIP KIT

## (undated) DEVICE — SUT MONO 3-0 PS-2 18 PLST

## (undated) DEVICE — SUT ETHIBOND EXCEL 2 V37 30

## (undated) DEVICE — TOURNIQUET 1 PORT BLUE 34X4IN

## (undated) DEVICE — SHIELD EYE PLASTIC 3100G

## (undated) DEVICE — ELECTRODE REM PLYHSV RETURN 9